# Patient Record
Sex: MALE | Race: BLACK OR AFRICAN AMERICAN
[De-identification: names, ages, dates, MRNs, and addresses within clinical notes are randomized per-mention and may not be internally consistent; named-entity substitution may affect disease eponyms.]

---

## 2017-02-07 ENCOUNTER — HOSPITAL ENCOUNTER (EMERGENCY)
Dept: HOSPITAL 17 - NEPE | Age: 67
Discharge: HOME | End: 2017-02-07
Payer: COMMERCIAL

## 2017-02-07 VITALS
DIASTOLIC BLOOD PRESSURE: 78 MMHG | TEMPERATURE: 97.5 F | OXYGEN SATURATION: 97 % | RESPIRATION RATE: 19 BRPM | SYSTOLIC BLOOD PRESSURE: 148 MMHG | HEART RATE: 89 BPM

## 2017-02-07 VITALS — BODY MASS INDEX: 24.49 KG/M2 | WEIGHT: 165.35 LBS | HEIGHT: 69 IN

## 2017-02-07 VITALS
RESPIRATION RATE: 18 BRPM | DIASTOLIC BLOOD PRESSURE: 64 MMHG | HEART RATE: 85 BPM | OXYGEN SATURATION: 97 % | SYSTOLIC BLOOD PRESSURE: 101 MMHG

## 2017-02-07 VITALS — RESPIRATION RATE: 18 BRPM

## 2017-02-07 VITALS
SYSTOLIC BLOOD PRESSURE: 99 MMHG | DIASTOLIC BLOOD PRESSURE: 67 MMHG | RESPIRATION RATE: 20 BRPM | OXYGEN SATURATION: 97 % | HEART RATE: 83 BPM

## 2017-02-07 VITALS
RESPIRATION RATE: 18 BRPM | OXYGEN SATURATION: 98 % | SYSTOLIC BLOOD PRESSURE: 119 MMHG | HEART RATE: 71 BPM | DIASTOLIC BLOOD PRESSURE: 74 MMHG

## 2017-02-07 DIAGNOSIS — K21.9: ICD-10-CM

## 2017-02-07 DIAGNOSIS — J45.909: ICD-10-CM

## 2017-02-07 DIAGNOSIS — Z95.0: ICD-10-CM

## 2017-02-07 DIAGNOSIS — J44.9: ICD-10-CM

## 2017-02-07 DIAGNOSIS — Z79.4: ICD-10-CM

## 2017-02-07 DIAGNOSIS — K86.1: Primary | ICD-10-CM

## 2017-02-07 DIAGNOSIS — R94.31: ICD-10-CM

## 2017-02-07 DIAGNOSIS — I10: ICD-10-CM

## 2017-02-07 DIAGNOSIS — E11.9: ICD-10-CM

## 2017-02-07 DIAGNOSIS — Z87.442: ICD-10-CM

## 2017-02-07 LAB
ALP SERPL-CCNC: 92 U/L (ref 45–117)
ALT SERPL-CCNC: 12 U/L (ref 12–78)
ANION GAP SERPL CALC-SCNC: 8 MEQ/L (ref 5–15)
APTT BLD: 25.3 SEC (ref 24.3–30.1)
AST SERPL-CCNC: 8 U/L (ref 15–37)
B-OH-BUTYR SERPL-SCNC: 0.39 MMOL/L (ref 0–0.39)
BASOPHILS # BLD AUTO: 0 TH/MM3 (ref 0–0.2)
BASOPHILS NFR BLD: 0.5 % (ref 0–2)
BILIRUB SERPL-MCNC: 0.8 MG/DL (ref 0.2–1)
BUN SERPL-MCNC: 15 MG/DL (ref 7–18)
CHLORIDE SERPL-SCNC: 100 MEQ/L (ref 98–107)
COLOR UR: YELLOW
COMMENT (UR): (no result)
CULTURE IF INDICATED: (no result)
EOSINOPHIL # BLD: 0.4 TH/MM3 (ref 0–0.4)
EOSINOPHIL NFR BLD: 4.3 % (ref 0–4)
ERYTHROCYTE [DISTWIDTH] IN BLOOD BY AUTOMATED COUNT: 12.5 % (ref 11.6–17.2)
GFR SERPLBLD BASED ON 1.73 SQ M-ARVRAT: 65 ML/MIN (ref 89–?)
GLUCOSE UR STRIP-MCNC: 1000 MG/DL
HCO3 BLD-SCNC: 26.2 MEQ/L (ref 21–32)
HCT VFR BLD CALC: 49.7 % (ref 39–51)
HEMO FLAGS: (no result)
HGB UR QL STRIP: (no result)
INR PPP: 1 RATIO
KETONES UR STRIP-MCNC: (no result) MG/DL
LYMPHOCYTES # BLD AUTO: 2.9 TH/MM3 (ref 1–4.8)
LYMPHOCYTES NFR BLD AUTO: 34.4 % (ref 9–44)
MCH RBC QN AUTO: 31.9 PG (ref 27–34)
MCHC RBC AUTO-ENTMCNC: 34.4 % (ref 32–36)
MCV RBC AUTO: 92.7 FL (ref 80–100)
MONOCYTES NFR BLD: 11.6 % (ref 0–8)
NEUTROPHILS # BLD AUTO: 4.1 TH/MM3 (ref 1.8–7.7)
NEUTROPHILS NFR BLD AUTO: 49.2 % (ref 16–70)
NITRITE UR QL STRIP: (no result)
PLAT MORPH BLD: NORMAL
PLATELET # BLD: 177 TH/MM3 (ref 150–450)
PLATELET BLD QL SMEAR: NORMAL
POTASSIUM SERPL-SCNC: 4.2 MEQ/L (ref 3.5–5.1)
PROTHROMBIN TIME: 10.6 SEC (ref 9.8–11.6)
RBC # BLD AUTO: 5.36 MIL/MM3 (ref 4.5–5.9)
SCAN/DIFF: (no result)
SODIUM SERPL-SCNC: 134 MEQ/L (ref 136–145)
SP GR UR STRIP: 1.03 (ref 1–1.03)
SQUAMOUS #/AREA URNS HPF: <1 /HPF (ref 0–5)
WBC # BLD AUTO: 8.4 TH/MM3 (ref 4–11)

## 2017-02-07 PROCEDURE — 85025 COMPLETE CBC W/AUTO DIFF WBC: CPT

## 2017-02-07 PROCEDURE — 96375 TX/PRO/DX INJ NEW DRUG ADDON: CPT

## 2017-02-07 PROCEDURE — 80053 COMPREHEN METABOLIC PANEL: CPT

## 2017-02-07 PROCEDURE — 81001 URINALYSIS AUTO W/SCOPE: CPT

## 2017-02-07 PROCEDURE — 96374 THER/PROPH/DIAG INJ IV PUSH: CPT

## 2017-02-07 PROCEDURE — 96372 THER/PROPH/DIAG INJ SC/IM: CPT

## 2017-02-07 PROCEDURE — 82010 KETONE BODYS QUAN: CPT

## 2017-02-07 PROCEDURE — 99284 EMERGENCY DEPT VISIT MOD MDM: CPT

## 2017-02-07 PROCEDURE — 85730 THROMBOPLASTIN TIME PARTIAL: CPT

## 2017-02-07 PROCEDURE — 83735 ASSAY OF MAGNESIUM: CPT

## 2017-02-07 PROCEDURE — 71010: CPT

## 2017-02-07 PROCEDURE — 84484 ASSAY OF TROPONIN QUANT: CPT

## 2017-02-07 PROCEDURE — 74177 CT ABD & PELVIS W/CONTRAST: CPT

## 2017-02-07 PROCEDURE — 93005 ELECTROCARDIOGRAM TRACING: CPT

## 2017-02-07 PROCEDURE — 85610 PROTHROMBIN TIME: CPT

## 2017-02-07 PROCEDURE — 83690 ASSAY OF LIPASE: CPT

## 2017-02-07 PROCEDURE — 96361 HYDRATE IV INFUSION ADD-ON: CPT

## 2017-02-07 NOTE — RADRPT
EXAM DATE/TIME:  02/07/2017 08:11 

 

[facility] COMPARISON:     

CHEST SINGLE AP, May 23, 2016, 11:47.

 

                     

INDICATIONS:     

Short of breath, pain in upper abdomen

                     

 

MEDICAL HISTORY:     

Chronic obstructive pulmonary disease.  Diabetes mellitus type II.  Cardiovascular disease.   Pancrea
titis   

 

SURGICAL HISTORY:     

Pacemaker. Cholecystectomy.  

 

ENCOUNTER:     

Initial                                        

 

ACUITY:     

1 day      

 

PAIN SCORE:     

0/10

 

LOCATION:     

Bilateral chest 

 

FINDINGS:     

A left subclavian dual lead pacemaker has its tip is in the right atrium and right ventricle.  There 
is no pneumothorax.  Bibasilar fibrotic scarring is stable.  The pulmonary vascular pattern is normal
.  The heart and mediastinal structures are normal.  The lungs are clear without acute focal pulmonar
y infiltrate.  

 

CONCLUSION:     

1. No acute focal pulmonary infiltrate or pulmonary vascular congestion. 

2. Bibasilar fibrotic scarring.  

 

 

 

 Fredy Graham MD on February 07, 2017 at 9:11                

Board Certified Radiologist.

 This report was verified electronically.

## 2017-02-07 NOTE — PD
HPI


Chief Complaint:  Abdominal Pain


Time Seen by Provider:  07:56


Travel History


International Travel<30 days:  No


Contact w/Intl Traveler<30days:  No


Traveled to known affect area:  No





History of Present Illness


HPI


65yo M with PMH of IDDM, chronic pancreatitis, former ETOH abuse, emphysema 

presents to the ED with c/o abdominal pain for 3 days.  Pain is constant, 

periumbilical and epigastric.  +NBNB vomiting.  Last BM yesterday.  Denies any 

fever, chest pain, sob, black stool, urinary complaints.  Pt is on morphine for 

chronic pain but did not take it for days.  Pt also did not take her insulin 

for 2 days because he does not feel like injecting himself.  Pt was 

hospitalized in 5/2016 for DKA and SHIVAM.





PFSH


Past Medical History


Arthritis:  No


Asthma:  Yes


Autoimmune Disease:  No


Blood Disorders:  No


Anxiety:  Yes


Depression:  Yes


Heart Rhythm Problems:  Yes (pace maker placed to treat bradycardia)


Cancer:  No


Cardiac Catheterization:  Yes


Cardiovascular Problems:  Yes


High Cholesterol:  No


Chemotherapy:  No


Chest Pain:  Yes (briseyda states that he some times has chest pain)


Congestive Heart Failure:  No


COPD:  Yes


Cerebrovascular Accident:  No


Diabetes:  Yes


Patient Takes Glucophage:  No


Diminished Hearing:  No


Endocrine:  Yes


Gastrointestinal Disorders:  Yes (PANCREATITIS)


GERD:  Yes


Glaucoma:  No


Genitourinary:  Yes


Headaches:  No


Hepatitis:  No


Hiatal Hernia:  No


Hypertension:  Yes


Immune Disorder:  No


Implanted Vascular Access Dvce:  Yes


Kidney Stones:  Yes


Musculoskeletal:  No


Neurologic:  No


Psychiatric:  No


Reproductive:  No


Respiratory:  Yes


Immunizations Current:  Yes


Migraines:  No


Myocardial Infarction:  No


Pancreatitis:  Yes


Pneumonia:  Yes


Radiation Therapy:  No


Renal Failure:  Yes


Seizures:  No


Sickle Cell Disease:  No


Sleep Apnea:  No


Thyroid Disease:  No


Ulcer:  No


PNEUMOCCOCAL Vaccine (Year):  2





Past Surgical History


Abdominal Surgery:  Yes (Gallbladder removed 1997. repair for same surgery)


AICD:  No


Arteriovenous Shunt:  No


Body Medical Devices:  PACEMAKER


Cardiac Surgery:  Yes (Pace maker 1997)


Cholecystectomy:  Yes


Ear Surgery:  No


Endocrine Surgery:  No


Eye Surgery:  No


Genitourinary Surgery:  No


Gynecologic Surgery:  No


Insulin Pump:  No


Joint Replacement:  No


Neurologic Surgery:  Yes (LAMINECTOMY L4/5)


Oral Surgery:  No


Pacemaker:  Yes (Biotronik)


Thoracic Surgery:  No


Other Surgery:  Yes (HAND SURGERY -RIGHT TENDON REPAIR)





Social History


Alcohol Use:  Yes (STATES QUIT)


Tobacco Use:  No (QUIT 2012)


Substance Use:  Yes (Cocaine, pt states that last time used is unknown)





Allergies-Medications


(Allergen,Severity, Reaction):  


Coded Allergies:  


     MRI PRECAUTION (Verified  Adverse Reaction, Severe, PACEMAKER, 2/7/17)


 PT HAS PACEMAKER PER [facility]SE [facility]


     *MDRO Multi-Drug Resistant Organism (Verified  Adverse Reaction, Unknown, 2 /7/17)


 MRSA PCR (nares) negative - 5/21/16 & 5/24/16


 ***Cleared per Infection Control***


 


 MRSA (blood and urine) - 2/2013


Reported Meds & Prescriptions





Reported Meds & Active Scripts


Active


Reported


Roxicodone (Oxycodone HCl) 15 Mg Tab 15 Mg PO Q8H PRN


Zoloft (Sertraline HCl) 50 Mg Tab 50 Mg PO DAILY


Bydureon Inj (Exenatide) 2 Mg Vial 2 Mg SQ Q7D


Flonase Allergy Relief Nasal Spray (Fluticasone Nasal Spray) 50 Mcg/Act Spray 

50 Mcg EACH NARE BID


Duoneb (Ipratropium-Albuterol Neb) 0.5-2.5 Mg/3 Ml Neb 1 Nebule INH Q4HR NEB


Pantoprazole (Pantoprazole Sodium) 20 Mg Tab 20 Mg PO DAILY


Proair Hfa 8.5 GM Inh (Albuterol Sulfate) 90 Mcg/Act Aer 2 Puff INH Q4-6H PRN


     108 mcg/actuation


Janumet (Sitagliptin-Metformin)  Mg Tab 1 Tab PO BID


Cialis (Tadalafil) 5 Mg Tab 5 Mg PO DAILY


     Do not exceed 1 dose/day.








Review of Systems


Except as stated in HPI:  all other systems reviewed are Neg





Physical Exam


Narrative


GENERAL: 65yo M not in distress.


SKIN: Warm and dry.


HEAD: Atraumatic. Normocephalic. 


NECK: Trachea midline. No JVD. 


CARDIOVASCULAR: Regular rate and rhythm.  No murmur appreciated.


RESPIRATORY: No accessory muscle use. Clear to auscultation. Breath sounds 

equal bilaterally. 


GASTROINTESTINAL: Abdomen soft, Mild periumbilical ttp.  No rebound tenderness 

or guarding.  Midline surgical scar noted.


MUSCULOSKELETAL: No obvious deformities. No clubbing.  No cyanosis.  No edema. 


NEUROLOGICAL: Awake and alert. No obvious cranial nerve deficits.  Motor 

grossly within normal limits. Normal speech.


PSYCHIATRIC: Appropriate mood and affect; insight and judgment normal.





Data


Data


Last Documented VS





Vital Signs








  Date Time  Temp Pulse Resp B/P Pulse Ox O2 Delivery O2 Flow Rate FiO2


 


2/7/17 14:06  71 18 119/74 98 Room Air  


 


2/7/17 07:48 97.5       








Orders





 Complete Blood Count With Diff (2/7/17 08:03)


Comprehensive Metabolic Panel (2/7/17 08:03)


Lipase (2/7/17 08:03)


Prothrombin Time / Inr (Pt) (2/7/17 08:03)


Act Partial Throm Time (Ptt) (2/7/17 08:03)


Urinalysis - C+S If Indicated (2/7/17 08:03)


Iv Access Insert/Monitor (2/7/17 08:03)


Ecg Monitoring (2/7/17 08:03)


Oximetry (2/7/17 08:03)


Ondansetron Inj (Zofran Inj) (2/7/17 08:15)


Sodium Chlor 0.9% 1000 Ml Inj (Ns 1000 M (2/7/17 08:03)


Sodium Chloride 0.9% Flush (Ns Flush) (2/7/17 08:15)


Electrocardiogram (2/7/17 08:03)


Beta Hydroxybutyrate (Acetone) (2/7/17 08:03)


Magnesium (Mg) (2/7/17 08:05)


Chest, Single Ap (2/7/17 )


Troponin I (2/7/17 08:10)


Ct Abd/Pel W Iv Contrast(Rout) (2/7/17 )


Consult Vascular Access Team (2/7/17 )


Vascular Poc Ultrasound (2/7/17 )


Sodium Chlor 0.9% 1000 Ml Inj (Ns 1000 M (2/7/17 13:00)


Insulin Human Regular Inj (Novolin R Inj (2/7/17 13:00)


Iohexol 350 Inj (Omnipaque 350 Inj) (2/7/17 12:55)


Ketorolac Inj (Toradol Inj) (2/7/17 13:00)


Morphine Inj (Morphine Inj) (2/7/17 13:45)


Blood Glucose (2/7/17 13:39)





Labs





 Laboratory Tests








Test 2/7/17 2/7/17 2/7/17





 08:10 09:41 11:23


 


White Blood Count 8.4 TH/MM3  


 


Red Blood Count 5.36 MIL/MM3  


 


Hemoglobin 17.1 GM/DL  


 


Hematocrit 49.7 %  


 


Mean Corpuscular Volume 92.7 FL  


 


Mean Corpuscular Hemoglobin 31.9 PG  


 


Mean Corpuscular Hemoglobin 34.4 %  





Concent   


 


Red Cell Distribution Width 12.5 %  


 


Platelet Count 177 TH/MM3  


 


Mean Platelet Volume 10.2 FL  


 


Neutrophils (%) (Auto) 49.2 %  


 


Lymphocytes (%) (Auto) 34.4 %  


 


Monocytes (%) (Auto) 11.6 %  


 


Eosinophils (%) (Auto) 4.3 %  


 


Basophils (%) (Auto) 0.5 %  


 


Neutrophils # (Auto) 4.1 TH/MM3  


 


Lymphocytes # (Auto) 2.9 TH/MM3  


 


Monocytes # (Auto) 1.0 TH/MM3  


 


Eosinophils # (Auto) 0.4 TH/MM3  


 


Basophils # (Auto) 0.0 TH/MM3  


 


CBC Comment AUTO DIFF   


 


Differential Comment AUTO DIFF  





 CONFIRMED  


 


Platelet Estimate NORMAL   


 


Platelet Morphology Comment NORMAL   


 


Prothrombin Time  10.6 SEC 


 


Prothromb Time International  1.0 RATIO 





Ratio   


 


Activated Partial  25.3 SEC 





Thromboplast Time   


 


Urine Color  YELLOW  


 


Urine Turbidity  CLEAR  


 


Urine pH  5.5  


 


Urine Specific Gravity  1.030  


 


Urine Protein  NEG mg/dL 


 


Urine Glucose (UA)  1000 mg/dL 


 


Urine Ketones  NEG mg/dL 


 


Urine Occult Blood  NEG  


 


Urine Nitrite  NEG  


 


Urine Bilirubin  NEG  


 


Urine Urobilinogen  LESS THAN 2.0 





  MG/DL 


 


Urine Leukocyte Esterase  NEG  


 


Urine RBC  1 /hpf 


 


Urine WBC  3 /hpf 


 


Urine Squamous Epithelial  <1 /hpf 





Cells   


 


Microscopic Urinalysis Comment  CULT NOT 





  INDICATED 


 


Sodium Level   134 MEQ/L


 


Potassium Level   4.2 MEQ/L


 


Chloride Level   100 MEQ/L


 


Carbon Dioxide Level   26.2 MEQ/L


 


Anion Gap   8 MEQ/L


 


Blood Urea Nitrogen   15 MG/DL


 


Creatinine   1.33 MG/DL


 


Estimat Glomerular Filtration   65 ML/MIN





Rate   


 


Random Glucose   372 MG/DL


 


Calcium Level   8.3 MG/DL


 


Magnesium Level   1.8 MG/DL


 


Total Bilirubin   0.8 MG/DL


 


Aspartate Amino Transf   8 U/L





(AST/SGOT)   


 


Alanine Aminotransferase   12 U/L





(ALT/SGPT)   


 


Alkaline Phosphatase   92 U/L


 


Troponin I   LESS THAN 0.02





   NG/ML


 


Total Protein   6.7 GM/DL


 


Albumin   3.3 GM/DL


 


Lipase   316 U/L


 


B-Hydroxybutyrate   0.39 MMOL/L











[facility]


Medical Decision Making


Medical Screen Exam Complete:  Yes


Emergency Medical Condition:  Yes


Interpretation(s)


EKG: NSR 84bpm.  LAD.  No ST segment elevation or depression.


Differential Diagnosis


Acute on chronic pancreatitis vs. colitis vs. gastritis vs.  DKA vs. 

electrolyte abnormalities vs. UTI


Narrative Course


65yo M with abdominal pain and chronic pancreatitis.  Pt is not in distress and 

has not been compliant with medications.  States he has his insulin at home.  

Labs reviewed, no leukocytosis.  Creatinine mildly elevated at 1.33.  Glucose 

is elevated at 372.  No increased anion gap.  K: 4.2.  Troponin negative.  

Lipase 316.  b-Hydroxybutrate normal.  UA negative.  Pt given NS IVF x2 and 

zofran and toradol.  Pt is no longer nauseous, tolerating PO.  Given 5 units of 

regular insulin.  CXR negative.  CTa/p showed dilatation of pancreatic duct 

measuring up to 7-8mm increase from 6mm from prior.  Discussed with GI Dr. Love and states this is progression of his chronic pancreatitis and he can 

follow up as outpatient.  Pt still with some pain, morphine 2mg IV given with 

improvement of pain and return precautions given.  Repeat blood glucose is 250.





Diagnosis





 Primary Impression:  


 Chronic pancreatitis


 Qualified Code:  K86.0 - Alcohol-induced chronic pancreatitis


Patient Instructions:  General Instructions


Departure Forms:  Tests/Procedures





***Additional Instructions:


Please follow up with GI as outpatient in 3-7 days.  Return to the ED if 

symptoms worsen.  Please take your insulin at home.


***Med/Other Pt SpecificInfo:  No Change to Meds


Disposition:  01 DISCHARGE HOME


Condition:  Stable








Sobia Robldeo DO Feb 7, 2017 08:10

## 2017-02-07 NOTE — RADRPT
EXAM DATE/TIME:  02/07/2017 12:47 

 

[facility] COMPARISON:     

CT ABDOMEN & PELVIS W CONTRAST, May 27, 2016, 19:29.

 

 

INDICATIONS :     

Epigastric pain, prior history of pancreatitis

                      

 

IV CONTRAST:     

90 cc Omnipaque 350 (iohexol) IV 

 

 

ORAL CONTRAST:      

No oral contrast ingested.

                      

 

RADIATION DOSE:     

6.19 CTDIvol (mGy) 

 

 

MEDICAL HISTORY :     

Hypertension. Diabetes mellitus type 1. Chronic obstructive pulmonary disease.

 

SURGICAL HISTORY :      

Cholecystectomy. 

 

ENCOUNTER:      

Initial

 

ACUITY:      

1 day

 

PAIN SCALE:      

5/10

 

LOCATION:         

epigastric 

 

TECHNIQUE:     

Volumetric scanning of the abdomen and pelvis was performed.  Using automated exposure control and ad
justment of the mA and/or kV according to patient size, radiation dose was kept as low as reasonably 
achievable to obtain optimal diagnostic quality images. 

 

FINDINGS:     

 

LOWER LUNGS:     

Chronic scarring is present at the lung bases.

 

LIVER:     

Homogeneous density without lesion. The patient is again noted to be status post cholecystectomy. Pne
umobilia remains in the left lobe. There is diffuse fatty infiltration with no focal mass.

 

SPLEEN:     

Normal size without lesion.

 

PANCREAS:     

The pancreatic duct is dilated measuring up to 7-8 mm in greatest diameter. On the prior study this m
easured approximately 6 mm in diameter. There is no distinct focal pancreatic mass or inflammatory ch
andrae. The pancreas is stable in size. There is a small calcification in the pancreatic head.

 

KIDNEYS:     

Normal in size and shape.  There is no mass, stone or hydronephrosis.

 

ADRENAL GLANDS:     

Within normal limits.

 

VASCULAR:     

There is no aortic aneurysm.

 

BOWEL/MESENTERY:     

The stomach, small bowel, and colon demonstrate no acute abnormality.  There is no free intraperitone
al air or fluid. There is a normal appendix.

 

ABDOMINAL WALL:     

Within normal limits.

 

RETROPERITONEUM:     

There is no lymphadenopathy. 

 

BLADDER:     

No wall thickening or mass. 

 

REPRODUCTIVE:     

Within normal limits.

 

INGUINAL:     

There is no lymphadenopathy or hernia. 

 

MUSCULOSKELETAL:     

Within normal limits for patient age. 

 

CONCLUSION:     

1. Dilatation of the pancreatic duct measuring up to 7-8 mm which is increased in size compared to th
e prior study. There is no distinct pancreatic mass. There is a small calcification which may be rela
avis to chronic pancreatitis. There is no acute acute inflammatory change.

2. Status post cholecystectomy with no evidence of biliary obstruction. Pneumobilia remains in the le
ft lobe.

3. Fatty infiltration of the liver.

4. Unremarkable bowel gas pattern.

 

 

 

 Nathan Chavez MD on February 07, 2017 at 12:58           

Board Certified Radiologist.

 This report was verified electronically.

## 2017-06-29 ENCOUNTER — HOSPITAL ENCOUNTER (EMERGENCY)
Dept: HOSPITAL 17 - NEPE | Age: 67
Discharge: HOME | End: 2017-06-29
Payer: COMMERCIAL

## 2017-06-29 VITALS
DIASTOLIC BLOOD PRESSURE: 93 MMHG | RESPIRATION RATE: 14 BRPM | TEMPERATURE: 98.4 F | SYSTOLIC BLOOD PRESSURE: 155 MMHG | HEART RATE: 93 BPM

## 2017-06-29 VITALS
RESPIRATION RATE: 18 BRPM | OXYGEN SATURATION: 98 % | DIASTOLIC BLOOD PRESSURE: 96 MMHG | SYSTOLIC BLOOD PRESSURE: 156 MMHG | HEART RATE: 90 BPM | TEMPERATURE: 98.4 F

## 2017-06-29 VITALS — HEIGHT: 69 IN | WEIGHT: 154.32 LBS | BODY MASS INDEX: 22.86 KG/M2

## 2017-06-29 DIAGNOSIS — K85.20: Primary | ICD-10-CM

## 2017-06-29 DIAGNOSIS — I10: ICD-10-CM

## 2017-06-29 DIAGNOSIS — F41.9: ICD-10-CM

## 2017-06-29 DIAGNOSIS — E11.9: ICD-10-CM

## 2017-06-29 DIAGNOSIS — Z87.442: ICD-10-CM

## 2017-06-29 DIAGNOSIS — J44.9: ICD-10-CM

## 2017-06-29 DIAGNOSIS — K21.9: ICD-10-CM

## 2017-06-29 DIAGNOSIS — J45.909: ICD-10-CM

## 2017-06-29 DIAGNOSIS — F32.9: ICD-10-CM

## 2017-06-29 LAB
ALP SERPL-CCNC: 103 U/L (ref 45–117)
ALT SERPL-CCNC: 19 U/L (ref 12–78)
ANION GAP SERPL CALC-SCNC: 7 MEQ/L (ref 5–15)
AST SERPL-CCNC: 17 U/L (ref 15–37)
BASOPHILS # BLD AUTO: 0 TH/MM3 (ref 0–0.2)
BASOPHILS NFR BLD: 0.3 % (ref 0–2)
BILIRUB SERPL-MCNC: 1.2 MG/DL (ref 0.2–1)
BUN SERPL-MCNC: 9 MG/DL (ref 7–18)
CHLORIDE SERPL-SCNC: 102 MEQ/L (ref 98–107)
COLOR UR: YELLOW
COMMENT (UR): (no result)
CULTURE IF INDICATED: (no result)
EOSINOPHIL # BLD: 0.2 TH/MM3 (ref 0–0.4)
EOSINOPHIL NFR BLD: 2.1 % (ref 0–4)
ERYTHROCYTE [DISTWIDTH] IN BLOOD BY AUTOMATED COUNT: 12.4 % (ref 11.6–17.2)
GFR SERPLBLD BASED ON 1.73 SQ M-ARVRAT: 85 ML/MIN (ref 89–?)
GLUCOSE UR STRIP-MCNC: 1000 MG/DL
HCO3 BLD-SCNC: 28.8 MEQ/L (ref 21–32)
HCT VFR BLD CALC: 48.3 % (ref 39–51)
HEMO FLAGS: (no result)
HGB UR QL STRIP: (no result)
KETONES UR STRIP-MCNC: (no result) MG/DL
LYMPHOCYTES # BLD AUTO: 1.6 TH/MM3 (ref 1–4.8)
LYMPHOCYTES NFR BLD AUTO: 21.3 % (ref 9–44)
MCH RBC QN AUTO: 31.8 PG (ref 27–34)
MCHC RBC AUTO-ENTMCNC: 34.1 % (ref 32–36)
MCV RBC AUTO: 93.3 FL (ref 80–100)
MONOCYTES NFR BLD: 11.9 % (ref 0–8)
NEUTROPHILS # BLD AUTO: 4.8 TH/MM3 (ref 1.8–7.7)
NEUTROPHILS NFR BLD AUTO: 64.4 % (ref 16–70)
NITRITE UR QL STRIP: (no result)
PLATELET # BLD: 177 TH/MM3 (ref 150–450)
POTASSIUM SERPL-SCNC: 3.9 MEQ/L (ref 3.5–5.1)
RBC # BLD AUTO: 5.18 MIL/MM3 (ref 4.5–5.9)
SODIUM SERPL-SCNC: 138 MEQ/L (ref 136–145)
SP GR UR STRIP: 1.01 (ref 1–1.03)
SQUAMOUS #/AREA URNS HPF: <1 /HPF (ref 0–5)
TRANS CELLS #/AREA URNS HPF: <1 /HPF
WBC # BLD AUTO: 7.5 TH/MM3 (ref 4–11)

## 2017-06-29 PROCEDURE — 85025 COMPLETE CBC W/AUTO DIFF WBC: CPT

## 2017-06-29 PROCEDURE — 83690 ASSAY OF LIPASE: CPT

## 2017-06-29 PROCEDURE — 96376 TX/PRO/DX INJ SAME DRUG ADON: CPT

## 2017-06-29 PROCEDURE — 99284 EMERGENCY DEPT VISIT MOD MDM: CPT

## 2017-06-29 PROCEDURE — 96361 HYDRATE IV INFUSION ADD-ON: CPT

## 2017-06-29 PROCEDURE — 96374 THER/PROPH/DIAG INJ IV PUSH: CPT

## 2017-06-29 PROCEDURE — 96375 TX/PRO/DX INJ NEW DRUG ADDON: CPT

## 2017-06-29 PROCEDURE — 81001 URINALYSIS AUTO W/SCOPE: CPT

## 2017-06-29 PROCEDURE — 80053 COMPREHEN METABOLIC PANEL: CPT

## 2017-06-29 NOTE — PD
HPI


Chief Complaint:  Abdominal Pain


Time Seen by Provider:  12:45


Travel History


International Travel<30 days:  No


Contact w/Intl Traveler<30days:  No


Traveled to known affect area:  No





History of Present Illness


HPI


This is a 66 year old male with a history of recurrent pancreatitis who 

presents for evaluation of epigastric abdominal pain.  He reports that 5 days 

ago he went on a drinking binge for a birthday party.  He reports that the next 

day developed epigastric abdominal pain.  The pain is a burning sensation which 

is constant, consistent with previous episodes of pancreatitis.  He reports 

occasional intermittent vomiting.  He has been tolerating oral food, for 

example this morning he ate molina, eggs and grits.  He denies any fevers, 

diarrhea, upper respiratory symptoms, chest pain or shortness of breath, rash, 

flank pain, testicular or scrotal pain.  He has no other complaints.





PFSH


Past Medical History


Arthritis:  No


Asthma:  Yes


Autoimmune Disease:  No


Blood Disorders:  No


Anxiety:  Yes


Depression:  Yes


Heart Rhythm Problems:  Yes (pace maker placed to treat bradycardia)


Cancer:  No


Cardiac Catheterization:  Yes


Cardiovascular Problems:  Yes


High Cholesterol:  No


Chemotherapy:  No


Chest Pain:  Yes (briseyda states that he some times has chest pain)


Congestive Heart Failure:  No


COPD:  Yes


Cerebrovascular Accident:  No


Diabetes:  Yes


Diminished Hearing:  No


Endocrine:  Yes


Gastrointestinal Disorders:  Yes (PANCREATITIS)


GERD:  Yes


Glaucoma:  No


Genitourinary:  Yes


Headaches:  No


Hepatitis:  No


Hiatal Hernia:  No


Hypertension:  Yes


Immune Disorder:  No


Implanted Vascular Access Dvce:  Yes


Kidney Stones:  Yes


Musculoskeletal:  No


Neurologic:  No


Psychiatric:  No


Reproductive:  No


Respiratory:  Yes


Immunizations Current:  Yes


Migraines:  No


Myocardial Infarction:  No


Pancreatitis:  Yes


Pneumonia:  Yes


Radiation Therapy:  No


Renal Failure:  Yes


Seizures:  No


Sickle Cell Disease:  No


Sleep Apnea:  No


Thyroid Disease:  No


Ulcer:  No


PNEUMOCCOCAL Vaccine (Year):  2





Past Surgical History


Abdominal Surgery:  Yes (Gallbladder removed 1997. repair for same surgery)


AICD:  No


Arteriovenous Shunt:  No


Body Medical Devices:  PACEMAKER


Cardiac Surgery:  Yes (Pace maker 1997)


Cholecystectomy:  Yes


Ear Surgery:  No


Endocrine Surgery:  No


Eye Surgery:  No


Genitourinary Surgery:  No


Gynecologic Surgery:  No


Insulin Pump:  No


Joint Replacement:  No


Neurologic Surgery:  Yes (LAMINECTOMY L4/5)


Oral Surgery:  No


Pacemaker:  Yes (Biotronik)


Thoracic Surgery:  No


Other Surgery:  Yes (HAND SURGERY -RIGHT TENDON REPAIR)





Social History


Alcohol Use:  Yes (STATES QUIT)


Tobacco Use:  No (QUIT 2012)


Substance Use:  Yes (Cocaine, pt states that last time used is unknown)





Allergies-Medications


(Allergen,Severity, Reaction):  


Coded Allergies:  


     MRI PRECAUTION (Verified  Adverse Reaction, Severe, PACEMAKER, 6/29/17)


 PT HAS PACEMAKER PER NURSE [facility]


     *MDRO Multi-Drug Resistant Organism (Verified  Adverse Reaction, Unknown, 6 /29/17)


 MRSA PCR (nares) negative - 5/21/16 & 5/24/16


 ***Cleared per Infection Control***


 


 MRSA (blood and urine) - 2/2013


Reported Meds & Prescriptions





Reported Meds & Active Scripts


Active


Zofran Odt (Ondansetron Odt) 4 Mg Tab 4 Mg SL Q6HR PRN


Reported


Roxicodone (Oxycodone HCl) 15 Mg Tab 15 Mg PO Q8H PRN


Flonase Nasal Spray (Fluticasone Nasal Spray) 50 Mcg/Act Spray 50 Mcg EACH NARE 

BID


Duoneb (Ipratropium-Albuterol Neb) 0.5-2.5 Mg/3 Ml Neb 1 Nebule INH Q4HR NEB


Pantoprazole (Pantoprazole Sodium) 20 Mg Tab 20 Mg PO DAILY


Proair Hfa 8.5 GM Inh (Albuterol Sulfate) 90 Mcg/Act Aer 2 Puff INH Q4-6H PRN


     108 mcg/actuation


Janumet (Sitagliptin-Metformin)  Mg Tab 1 Tab PO BID


Cialis (Tadalafil) 5 Mg Tab 5 Mg PO DAILY


     Do not exceed 1 dose/day.








Review of Systems


Except as stated in HPI:  all other systems reviewed are Neg





Physical Exam


Narrative


GENERAL: Well-developed well-nourished male in no acute distress, ambulatory in 

the ED.


SKIN: Warm and dry.


HEAD: Atraumatic. Normocephalic. 


EYES: Pupils equal and round. No scleral icterus. No injection or drainage. 


ENT: No nasal bleeding or discharge.  Mucous membranes pink and moist.


NECK: Trachea midline. No JVD. 


CARDIOVASCULAR: Regular rate and rhythm.  No murmur appreciated.


RESPIRATORY: No accessory muscle use. Clear to auscultation. Breath sounds 

equal bilaterally. 


GASTROINTESTINAL: Abdomen soft, mild epigastric tenderness without guarding.  

No CVA tenderness.


MUSCULOSKELETAL: No obvious deformities. No edema. 


NEUROLOGICAL: Awake and alert. No obvious cranial nerve deficits.  Motor 

grossly within normal limits. Normal speech.


PSYCHIATRIC: Appropriate mood and affect; insight and judgment normal.





Data


Data


Last Documented VS





Vital Signs








  Date Time  Temp Pulse Resp B/P Pulse Ox O2 Delivery O2 Flow Rate FiO2


 


6/29/17 12:48 98.4 93 14 155/93  Room Air  


 


6/29/17 12:29     98   








Orders








 Complete Blood Count With Diff (6/29/17 12:51)


Comprehensive Metabolic Panel (6/29/17 12:51)


Lipase (6/29/17 12:51)


Urinalysis - C+S If Indicated (6/29/17 12:51)


Iv Access Insert/Monitor (6/29/17 12:51)


Morphine Inj (Morphine Inj) (6/29/17 13:00)


Ondansetron Inj (Zofran Inj) (6/29/17 13:00)


Sodium Chlor 0.9% 1000 Ml Inj (Ns 1000 M (6/29/17 12:51)


Sodium Chlor 0.9% 1000 Ml Inj (Ns 1000 M (6/29/17 14:28)


Morphine Inj (Morphine Inj) (6/29/17 14:30)








Labs








 Laboratory Tests








Test 6/29/17 6/29/17 6/29/17





 12:55 13:46 14:35


 


White Blood Count 7.5 TH/MM3  


 


Red Blood Count 5.18 MIL/MM3  


 


Hemoglobin 16.5 GM/DL  


 


Hematocrit 48.3 %  


 


Mean Corpuscular Volume 93.3 FL  


 


Mean Corpuscular Hemoglobin 31.8 PG  


 


Mean Corpuscular Hemoglobin 34.1 %  





Concent   


 


Red Cell Distribution Width 12.4 %  


 


Platelet Count 177 TH/MM3  


 


Mean Platelet Volume 9.2 FL  


 


Neutrophils (%) (Auto) 64.4 %  


 


Lymphocytes (%) (Auto) 21.3 %  


 


Monocytes (%) (Auto) 11.9 %  


 


Eosinophils (%) (Auto) 2.1 %  


 


Basophils (%) (Auto) 0.3 %  


 


Neutrophils # (Auto) 4.8 TH/MM3  


 


Lymphocytes # (Auto) 1.6 TH/MM3  


 


Monocytes # (Auto) 0.9 TH/MM3  


 


Eosinophils # (Auto) 0.2 TH/MM3  


 


Basophils # (Auto) 0.0 TH/MM3  


 


CBC Comment DIFF FINAL   


 


Differential Comment    


 


Urine Color  YELLOW  


 


Urine Turbidity  CLEAR  


 


Urine pH  7.5  


 


Urine Specific Gravity  1.015  


 


Urine Protein  TRACE mg/dL 


 


Urine Glucose (UA)  1000 mg/dL 


 


Urine Ketones  NEG mg/dL 


 


Urine Occult Blood  NEG  


 


Urine Nitrite  NEG  


 


Urine Bilirubin  NEG  


 


Urine Urobilinogen  2.0 MG/DL 


 


Urine Leukocyte Esterase  NEG  


 


Urine RBC  LESS THAN 1 





  /hpf 


 


Urine WBC  1 /hpf 


 


Urine Squamous Epithelial  <1 /hpf 





Cells   


 


Urine Transitional Epithelial  <1 /hpf 





Cells   


 


Microscopic Urinalysis Comment  CULT NOT 





  INDICATED 


 


Sodium Level   138 MEQ/L


 


Potassium Level   3.9 MEQ/L


 


Chloride Level   102 MEQ/L


 


Carbon Dioxide Level   28.8 MEQ/L


 


Anion Gap   7 MEQ/L


 


Blood Urea Nitrogen   9 MG/DL


 


Creatinine   1.06 MG/DL


 


Estimat Glomerular Filtration   85 ML/MIN





Rate   


 


Random Glucose   213 MG/DL


 


Calcium Level   8.3 MG/DL


 


Total Bilirubin   1.2 MG/DL


 


Aspartate Amino Transf   17 U/L





(AST/SGOT)   


 


Alanine Aminotransferase   19 U/L





(ALT/SGPT)   


 


Alkaline Phosphatase   103 U/L


 


Total Protein   7.9 GM/DL


 


Albumin   3.3 GM/DL


 


Lipase   521 U/L














MDM


Medical Decision Making


Medical Screen Exam Complete:  Yes


Emergency Medical Condition:  Yes


Medical Record Reviewed:  Yes


Differential Diagnosis


Acute exacerbation of chronic pancreatitis, pancreatic hemorrhage, abscess, 

gastritis, perforated viscus, AAA


Narrative Course


66-year-old male with history of frequent exacerbations of alcohol-induced 

pancreatitis presents for evaluation of 4 days of epigastric abdominal pain 

which started after a drinking binge.  His pain is consistent with previous 

episodes of pancreatitis.  He has been seen here multiple times in the past 

with similar pain.  On examination he has mild epigastric tenderness to 

palpation.  Plan is for basic lab work, IV fluids, morphine, Zofran.





Lab work is been reviewed.  The lipase is mildly elevated in the mid 500s.  He 

feels improvement after the second dose of IV fluids and morphine.  He is 

stable for discharge.





Diagnosis





 Primary Impression:  


 Pancreatitis


 Qualified Code:  K85.20 - Alcohol-induced acute pancreatitis, unspecified 

complication status





***Additional Instructions:


Liquid diet over the next 3-4 days.  Zofran for nausea.  Avoid alcohol use.  

Follow-up with primary care physician.  Return for any emergent medical 

conditions.


***Med/Other Pt SpecificInfo:  Prescription(s) given


Scripts


Ondansetron Odt (Zofran Odt)4 Mg Tab4 Mg SL Q6HR PRN (Nausea/Vomiting) #20 TAB  

Ref 0


   Prov:Megan Bradshaw MD         6/29/17


Disposition:  01 DISCHARGE HOME


Condition:  Stable








Mateo Warner Jun 29, 2017 12:55

## 2017-07-30 ENCOUNTER — HOSPITAL ENCOUNTER (INPATIENT)
Dept: HOSPITAL 17 - NEPC | Age: 67
LOS: 2 days | Discharge: HOME | DRG: 440 | End: 2017-08-01
Attending: HOSPITALIST | Admitting: HOSPITALIST
Payer: COMMERCIAL

## 2017-07-30 VITALS
HEART RATE: 65 BPM | RESPIRATION RATE: 23 BRPM | SYSTOLIC BLOOD PRESSURE: 157 MMHG | DIASTOLIC BLOOD PRESSURE: 100 MMHG | OXYGEN SATURATION: 100 % | TEMPERATURE: 98.3 F

## 2017-07-30 VITALS — HEIGHT: 69 IN | BODY MASS INDEX: 23.58 KG/M2 | WEIGHT: 159.17 LBS

## 2017-07-30 VITALS
HEART RATE: 60 BPM | DIASTOLIC BLOOD PRESSURE: 95 MMHG | RESPIRATION RATE: 19 BRPM | SYSTOLIC BLOOD PRESSURE: 151 MMHG | OXYGEN SATURATION: 96 %

## 2017-07-30 VITALS
SYSTOLIC BLOOD PRESSURE: 197 MMHG | DIASTOLIC BLOOD PRESSURE: 113 MMHG | TEMPERATURE: 96.9 F | OXYGEN SATURATION: 99 % | RESPIRATION RATE: 20 BRPM | HEART RATE: 62 BPM

## 2017-07-30 VITALS
SYSTOLIC BLOOD PRESSURE: 186 MMHG | TEMPERATURE: 96.4 F | OXYGEN SATURATION: 98 % | HEART RATE: 73 BPM | RESPIRATION RATE: 22 BRPM | DIASTOLIC BLOOD PRESSURE: 99 MMHG

## 2017-07-30 VITALS — OXYGEN SATURATION: 98 %

## 2017-07-30 DIAGNOSIS — F10.20: ICD-10-CM

## 2017-07-30 DIAGNOSIS — K85.20: Primary | ICD-10-CM

## 2017-07-30 DIAGNOSIS — F41.9: ICD-10-CM

## 2017-07-30 DIAGNOSIS — K21.9: ICD-10-CM

## 2017-07-30 DIAGNOSIS — E11.65: ICD-10-CM

## 2017-07-30 DIAGNOSIS — G89.29: ICD-10-CM

## 2017-07-30 DIAGNOSIS — F32.9: ICD-10-CM

## 2017-07-30 DIAGNOSIS — J44.9: ICD-10-CM

## 2017-07-30 DIAGNOSIS — I12.9: ICD-10-CM

## 2017-07-30 DIAGNOSIS — Z79.84: ICD-10-CM

## 2017-07-30 DIAGNOSIS — Z87.442: ICD-10-CM

## 2017-07-30 DIAGNOSIS — Z95.0: ICD-10-CM

## 2017-07-30 DIAGNOSIS — K86.1: ICD-10-CM

## 2017-07-30 DIAGNOSIS — E11.22: ICD-10-CM

## 2017-07-30 DIAGNOSIS — N18.3: ICD-10-CM

## 2017-07-30 DIAGNOSIS — Z87.891: ICD-10-CM

## 2017-07-30 LAB
ALP SERPL-CCNC: 76 U/L (ref 45–117)
ALT SERPL-CCNC: 17 U/L (ref 12–78)
ANION GAP SERPL CALC-SCNC: 12 MEQ/L (ref 5–15)
AST SERPL-CCNC: 31 U/L (ref 15–37)
BASOPHILS # BLD AUTO: 0 TH/MM3 (ref 0–0.2)
BASOPHILS NFR BLD: 0.4 % (ref 0–2)
BILIRUB SERPL-MCNC: 1.2 MG/DL (ref 0.2–1)
BUN SERPL-MCNC: 13 MG/DL (ref 7–18)
CHLORIDE SERPL-SCNC: 100 MEQ/L (ref 98–107)
CK SERPL-CCNC: 225 U/L (ref 39–308)
EOSINOPHIL # BLD: 0 TH/MM3 (ref 0–0.4)
EOSINOPHIL NFR BLD: 0.5 % (ref 0–4)
ERYTHROCYTE [DISTWIDTH] IN BLOOD BY AUTOMATED COUNT: 12.3 % (ref 11.6–17.2)
GFR SERPLBLD BASED ON 1.73 SQ M-ARVRAT: 68 ML/MIN (ref 89–?)
HCO3 BLD-SCNC: 27.3 MEQ/L (ref 21–32)
HCT VFR BLD CALC: 42.8 % (ref 39–51)
HEMO FLAGS: (no result)
LYMPHOCYTES # BLD AUTO: 1.3 TH/MM3 (ref 1–4.8)
LYMPHOCYTES NFR BLD AUTO: 21.7 % (ref 9–44)
MCH RBC QN AUTO: 32.2 PG (ref 27–34)
MCHC RBC AUTO-ENTMCNC: 34.4 % (ref 32–36)
MCV RBC AUTO: 93.4 FL (ref 80–100)
MONOCYTES NFR BLD: 10.7 % (ref 0–8)
NEUTROPHILS # BLD AUTO: 4 TH/MM3 (ref 1.8–7.7)
NEUTROPHILS NFR BLD AUTO: 66.7 % (ref 16–70)
PLATELET # BLD: 144 TH/MM3 (ref 150–450)
POTASSIUM SERPL-SCNC: 3.4 MEQ/L (ref 3.5–5.1)
RBC # BLD AUTO: 4.59 MIL/MM3 (ref 4.5–5.9)
SODIUM SERPL-SCNC: 139 MEQ/L (ref 136–145)
WBC # BLD AUTO: 5.9 TH/MM3 (ref 4–11)

## 2017-07-30 PROCEDURE — 94664 DEMO&/EVAL PT USE INHALER: CPT

## 2017-07-30 PROCEDURE — 94640 AIRWAY INHALATION TREATMENT: CPT

## 2017-07-30 PROCEDURE — 96375 TX/PRO/DX INJ NEW DRUG ADDON: CPT

## 2017-07-30 PROCEDURE — 83690 ASSAY OF LIPASE: CPT

## 2017-07-30 PROCEDURE — 82948 REAGENT STRIP/BLOOD GLUCOSE: CPT

## 2017-07-30 PROCEDURE — 84484 ASSAY OF TROPONIN QUANT: CPT

## 2017-07-30 PROCEDURE — 96374 THER/PROPH/DIAG INJ IV PUSH: CPT

## 2017-07-30 PROCEDURE — 85025 COMPLETE CBC W/AUTO DIFF WBC: CPT

## 2017-07-30 PROCEDURE — 80053 COMPREHEN METABOLIC PANEL: CPT

## 2017-07-30 PROCEDURE — 93005 ELECTROCARDIOGRAM TRACING: CPT

## 2017-07-30 PROCEDURE — 82550 ASSAY OF CK (CPK): CPT

## 2017-07-30 RX ADMIN — PHENYTOIN SODIUM SCH MLS/HR: 50 INJECTION INTRAMUSCULAR; INTRAVENOUS at 15:20

## 2017-07-30 RX ADMIN — HYDROMORPHONE HYDROCHLORIDE PRN MG: 1 INJECTION, SOLUTION INTRAMUSCULAR; INTRAVENOUS; SUBCUTANEOUS at 22:59

## 2017-07-30 RX ADMIN — PHENYTOIN SODIUM SCH MLS/HR: 50 INJECTION INTRAMUSCULAR; INTRAVENOUS at 21:25

## 2017-07-30 RX ADMIN — INSULIN ASPART SCH: 100 INJECTION, SOLUTION INTRAVENOUS; SUBCUTANEOUS at 20:31

## 2017-07-30 RX ADMIN — HYDROMORPHONE HYDROCHLORIDE PRN MG: 1 INJECTION, SOLUTION INTRAMUSCULAR; INTRAVENOUS; SUBCUTANEOUS at 18:14

## 2017-07-30 RX ADMIN — Medication SCH ML: at 20:16

## 2017-07-30 RX ADMIN — MORPHINE SULFATE PRN MG: 8 INJECTION INTRAVENOUS at 20:15

## 2017-07-30 RX ADMIN — STANDARDIZED SENNA CONCENTRATE AND DOCUSATE SODIUM SCH TAB: 8.6; 5 TABLET, FILM COATED ORAL at 20:17

## 2017-07-30 RX ADMIN — INSULIN ASPART SCH: 100 INJECTION, SOLUTION INTRAVENOUS; SUBCUTANEOUS at 17:20

## 2017-07-30 RX ADMIN — IPRATROPIUM BROMIDE AND ALBUTEROL SULFATE SCH AMPULE: .5; 3 SOLUTION RESPIRATORY (INHALATION) at 22:11

## 2017-07-30 NOTE — HHI.HP
__________________________________________________





hospitals


Service


[facility]


Primary Care Physician


DEENILSON Ty


Admission Diagnosis


acute pancreatitis


Diagnoses:  


Travel History


International Travel<30 Days:  No


Contact w/Intl Traveler <30 Da:  No


Traveled to Known Affected Are:  No


History of Present Illness


Mr. Osuna is a 66 year old male.  He has a history of alcohol abuse in the 

past, but has recently only been using alcohol occasionally.  He has a past 

history of pancreatitis, which has motivated him to drink less.  He was at a 

birthday party and had a few drinks of alcohol yesterday.  Starting last night 

he has been having abdominal pain and nausea/vomiting.  He was concerned as it 

felt like pancreatitis, and indeed he has pancreatitis on testing today.  Blood 

sugars are elevated also and potassium is low.  No other complaints.  No 

fevers.  No diarrhea.





Review of Systems


Constitutional:  DENIES: Fever, Chills, Change in appetite


Endocrine:  DENIES: Heat/cold intolerance


Eyes:  DENIES: Blurred vision, Eye pain


Ears, nose, mouth, throat:  DENIES: Hearing loss, Vertigo, Throat pain


Respiratory:  DENIES: Cough, Wheezing, Sputum production


Cardiovascular:  DENIES: Chest pain, Palpitations, Syncope


Gastrointestinal:  COMPLAINS OF: Abdominal pain, Nausea, Vomiting


Musculoskeletal:  DENIES: Joint pain, Muscle aches, Stiffness


Integumentary:  DENIES: Abnormal pigmentation


Hematologic/lymphatic:  DENIES: Bruising


Immunologic/allergic:  DENIES: Eczema


Neurologic:  DENIES: Abnormal gait


Psychiatric:  DENIES: Anxiety, Confusion, Hallucinations





Past Family Social History


Past Medical History


DM2


COPD


CKD3


HTN


Chronic Pancreatitis


Chronic Back Pain


Hx of Bradycardia


Past Surgical History


Lumbar Spine surgery


Cholecystectomy


Right hand laceration repair


Reported Medications





Reported Meds & Active Scripts


Active


Zofran Odt (Ondansetron Odt) 4 Mg Tab 4 Mg SL Q6HR PRN


Reported


Roxicodone (Oxycodone HCl) 15 Mg Tab 15 Mg PO Q8H PRN


Flonase Nasal Spray (Fluticasone Nasal Spray) 50 Mcg/Act Spray 50 Mcg EACH NARE 

BID


Duoneb (Ipratropium-Albuterol Neb) 0.5-2.5 Mg/3 Ml Neb 1 Nebule INH Q4HR NEB


Pantoprazole (Pantoprazole Sodium) 20 Mg Tab 20 Mg PO DAILY


Proair Hfa 8.5 GM Inh (Albuterol Sulfate) 90 Mcg/Act Aer 2 Puff INH Q4-6H PRN


     108 mcg/actuation


Janumet (Sitagliptin-Metformin)  Mg Tab 1 Tab PO BID


Cialis (Tadalafil) 5 Mg Tab 5 Mg PO DAILY


     Do not exceed 1 dose/day.


Allergies:  


Coded Allergies:  


     MRI PRECAUTION (Verified  Adverse Reaction, Severe, PACEMAKER, 6/29/17)


 PT HAS PACEMAKER PER [facility]URSE [facility]


     *MDRO Multi-Drug Resistant Organism (Verified  Adverse Reaction, Unknown, 6 /29/17)


 MRSA PCR (nares) negative - 5/21/16 & 5/24/16


 ***Cleared per Infection Control***


 


 MRSA (blood and urine) - 2/2013


Family History


DM2 in both parents


HTN and COPD in mother


Social History


Past history of smoking, no smoking for 10 years


Past history of alcohol abuse, presently drinks occasionally


No drug abuse





Physical Exam


Vital Signs





 Vital Signs








  Date Time  Temp Pulse Resp B/P Pulse Ox O2 Delivery O2 Flow Rate FiO2


 


7/30/17 12:22     98   


 


7/30/17 11:57 98.3 65 23 157/100 100   








Physical Exam


GENERAL: NAD, A&Ox3


SKIN: Warm and dry.


HEAD: Normocephalic.


EYES: No scleral icterus. No injection or drainage. 


NECK: Supple, trachea midline. No JVD or lymphadenopathy.


CARDIOVASCULAR: Regular rate and rhythm without murmurs, gallops, or rubs. 


RESPIRATORY: Breath sounds equal bilaterally. No accessory muscle use.


GASTROINTESTINAL: Abdomen soft.  Tenderness at upper abdomen.


MUSCULOSKELETAL: No cyanosis, or edema. 


BACK: Nontender without obvious deformity. No CVA tenderness.





Laboratory





Laboratory Tests








Test 7/30/17





 13:15


 


White Blood Count 5.9 


 


Red Blood Count 4.59 


 


Hemoglobin 14.8 


 


Hematocrit 42.8 


 


Mean Corpuscular Volume 93.4 


 


Mean Corpuscular Hemoglobin 32.2 


 


Mean Corpuscular Hemoglobin 34.4 





Concent 


 


Red Cell Distribution Width 12.3 


 


Platelet Count 144 


 


Mean Platelet Volume 8.6 


 


Neutrophils (%) (Auto) 66.7 


 


Lymphocytes (%) (Auto) 21.7 


 


Monocytes (%) (Auto) 10.7 


 


Eosinophils (%) (Auto) 0.5 


 


Basophils (%) (Auto) 0.4 


 


Neutrophils # (Auto) 4.0 


 


Lymphocytes # (Auto) 1.3 


 


Monocytes # (Auto) 0.6 


 


Eosinophils # (Auto) 0.0 


 


Basophils # (Auto) 0.0 


 


CBC Comment DIFF FINAL 


 


Differential Comment  


 


Sodium Level 139 


 


Potassium Level 3.4 


 


Chloride Level 100 


 


Carbon Dioxide Level 27.3 


 


Anion Gap 12 


 


Blood Urea Nitrogen 13 


 


Creatinine 1.29 


 


Estimat Glomerular Filtration 68 





Rate 


 


Random Glucose 292 


 


Calcium Level 8.1 


 


Total Bilirubin 1.2 


 


Aspartate Amino Transf 31 





(AST/SGOT) 


 


Alanine Aminotransferase 17 





(ALT/SGPT) 


 


Alkaline Phosphatase 76 


 


Total Creatine Kinase 225 


 


Troponin I LESS THAN 0.02 


 


Total Protein 6.5 


 


Albumin 3.2 


 


Lipase 4577 








Result Diagram:  


7/30/17 1315                                                                   

             7/30/17 1315








Assessment and Plan


Problem List:  


(1) Acute pancreatitis


ICD Code:  K85.90


Status:  Acute


(2) Chronic pancreatitis


ICD Code:  K86.1


Status:  Acute


Assessment and Plan





Assessment and Plan


66 year old male admitted with acute pancreatitis





Acute Pancreatitis


Hx of Chronic Pancreatitis


IV Hydration with NS at 150ml/hr


PRN pain treatments


Follow lipase level


monitor vital signs


Follow for fevers





DM2


Hyperglycemia


Follow blood sugars


Diabetic Diet


Insulin Sliding Scale





COPD


No exacerbation


Follow clinically





CKD3


Follow renal function


IV Hydration





HTN


Continue baseline treatments


Follow blood pressures





Chronic Back Pain


Narcotics are continued





Hx of Bradycardia


Follow on telemetry





DVT Prophylaxis


Lovenox





Discharge Planning


Needs improvement in pancreatitis prior to discharge





Physician Certification


2 Midnight Certification Type:  Admission for Inpatient Services


Order for Inpatient Services


The services are ordered in accordance with Medicare regulations or non-

Medicare payer requirements, as applicable.  In the case of services not 

specified as inpatient-only, they are appropriately provided as inpatient 

services in accordance with the 2-midnight benchmark.


Estimated LOS (days):  3


 days is the estimated time the patient will need to remain in the hospital, 

assuming treatment plan goals are met and no additional complications.


Post-Hospital Plan:  Home





Problem Qualifiers





(1) Acute pancreatitis:  


Qualified Code:  K85.20 - Alcohol-induced acute pancreatitis, unspecified 

complication status





Kade Cruz MD Jul 30, 2017 15:07

## 2017-07-30 NOTE — PD
HPI


Chief Complaint:  Abdominal Pain


Time Seen by Provider:  12:05


Travel History


International Travel<30 days:  No


Contact w/Intl Traveler<30days:  No


Traveled to known affect area:  No





History of Present Illness


HPI


66-year-old male presents for evaluation of epigastric abdominal pain.  

Symptoms started yesterday evening after drinking 4 beers.  The pain is a sharp 

pain, constant, associated with some nausea and vomiting this morning.  The 

pain is consistent with previous episodes of pancreatitis.  He denies chest pain

, fevers or chills, flank pain, dysuria.  He has no other complaints at this 

time.





PFSH


Past Medical History


Arthritis:  No


Asthma:  Yes


Autoimmune Disease:  No


Blood Disorders:  No


Anxiety:  Yes


Depression:  Yes


Heart Rhythm Problems:  Yes (pace maker placed to treat bradycardia)


Cancer:  No


Cardiac Catheterization:  Yes


Cardiovascular Problems:  Yes (PACER)


High Cholesterol:  No


Chemotherapy:  No


Chest Pain:  Yes (janessaen states that he some times has chest pain)


Congestive Heart Failure:  No


COPD:  Yes


Cerebrovascular Accident:  No


Diabetes:  Yes (TYPE II )


Patient Takes Glucophage:  Yes (PT STATES HE TAKES A PILL FOR HIS TYPE II 

DIABETIS )


Diminished Hearing:  No


Endocrine:  Yes


Gastrointestinal Disorders:  Yes (PANCREATITIS)


GERD:  Yes


Glaucoma:  No


Genitourinary:  Yes


Headaches:  No


Hepatitis:  No


Hiatal Hernia:  No


Heparin Induced Thrombocytopen:  No


Hypertension:  Yes


Immune Disorder:  No


Implanted Vascular Access Dvce:  Yes


Kidney Stones:  Yes


Musculoskeletal:  No


Neurologic:  No


Psychiatric:  No


Reproductive:  No


Respiratory:  Yes


Immunizations Current:  Yes


Migraines:  No


Myocardial Infarction:  No


Pancreatitis:  Yes


Pneumonia:  Yes


Radiation Therapy:  No


Renal Failure:  Yes


Seizures:  No


Sickle Cell Disease:  No


Sleep Apnea:  No


Thyroid Disease:  No


Ulcer:  No


PNEUMOCCOCAL Vaccine (Year):  2





Past Surgical History


Abdominal Surgery:  Yes (Gallbladder removed 1997. repair for same surgery)


AICD:  No


Arteriovenous Shunt:  No


Body Medical Devices:  PACEMAKER


Cardiac Surgery:  Yes (Pace maker 1997)


Cholecystectomy:  Yes


Ear Surgery:  No


Endocrine Surgery:  No


Eye Surgery:  No


Genitourinary Surgery:  No


Gynecologic Surgery:  No


Insulin Pump:  No


Joint Replacement:  No


Neurologic Surgery:  Yes (LAMINECTOMY L4/5)


Oral Surgery:  No


Pacemaker:  Yes (Biotronik)


Thoracic Surgery:  No


Other Surgery:  Yes (HAND SURGERY -RIGHT TENDON REPAIR)





Social History


Alcohol Use:  Yes (STATES QUIT)


Tobacco Use:  No (QUIT 2012)


Substance Use:  Yes (Cocaine, pt states that last time used is unknown)





Allergies-Medications


(Allergen,Severity, Reaction):  


Coded Allergies:  


     MRI PRECAUTION (Verified  Adverse Reaction, Severe, PACEMAKER, 6/29/17)


 PT HAS PACEMAKER PER [facility]URSE [facility]


     *MDRO Multi-Drug Resistant Organism (Verified  Adverse Reaction, Unknown, 6 /29/17)


 MRSA PCR (nares) negative - 5/21/16 & 5/24/16


 ***Cleared per Infection Control***


 


 MRSA (blood and urine) - 2/2013


Reported Meds & Prescriptions





Reported Meds & Active Scripts


Active


Zofran Odt (Ondansetron Odt) 4 Mg Tab 4 Mg SL Q6HR PRN


Reported


Roxicodone (Oxycodone HCl) 15 Mg Tab 15 Mg PO Q8H PRN


Flonase Nasal Spray (Fluticasone Nasal Spray) 50 Mcg/Act Spray 50 Mcg EACH NARE 

BID


Duoneb (Ipratropium-Albuterol Neb) 0.5-2.5 Mg/3 Ml Neb 1 Nebule INH Q4HR NEB


Pantoprazole (Pantoprazole Sodium) 20 Mg Tab 20 Mg PO DAILY


Proair Hfa 8.5 GM Inh (Albuterol Sulfate) 90 Mcg/Act Aer 2 Puff INH Q4-6H PRN


     108 mcg/actuation


Janumet (Sitagliptin-Metformin)  Mg Tab 1 Tab PO BID


Cialis (Tadalafil) 5 Mg Tab 5 Mg PO DAILY


     Do not exceed 1 dose/day.








Review of Systems


Except as stated in HPI:  all other systems reviewed are Neg





Physical Exam


Narrative


GENERAL: Well-developed well-nourished male who appears uncomfortable on 

initial examination


SKIN: Warm and dry.


HEAD: Atraumatic. Normocephalic. 


EYES: Pupils equal and round. No scleral icterus. No injection or drainage. 


ENT: No nasal bleeding or discharge.  Mucous membranes pink and moist.


NECK: Trachea midline. No JVD. 


CARDIOVASCULAR: Regular rate and rhythm.  No murmur appreciated.


RESPIRATORY: No accessory muscle use. Clear to auscultation. Breath sounds 

equal bilaterally. 


GASTROINTESTINAL: Abdomen soft, tender to palpation in the epigastrium without 

guarding


MUSCULOSKELETAL: No obvious deformities.  No edema. 


NEUROLOGICAL: Awake and alert. No obvious cranial nerve deficits.  Motor 

grossly within normal limits. Normal speech.


PSYCHIATRIC: Appropriate mood and affect; insight and judgment normal.





Data


Data


Last Documented VS





Vital Signs








  Date Time  Temp Pulse Resp B/P Pulse Ox O2 Delivery O2 Flow Rate FiO2


 


7/30/17 12:22     98   


 


7/30/17 11:57 98.3 65 23 157/100    








Orders





 Complete Blood Count With Diff (7/30/17 12:11)


Comprehensive Metabolic Panel (7/30/17 12:11)


Lipase (7/30/17 12:11)


Iv Access Insert/Monitor (7/30/17 12:11)


Ecg Monitoring (7/30/17 12:11)


Oximetry (7/30/17 12:11)


Morphine Inj (Morphine Inj) (7/30/17 12:15)


Ondansetron Inj (Zofran Inj) (7/30/17 12:15)


Sodium Chlor 0.9% 1000 Ml Inj (Ns 1000 M (7/30/17 12:11)


Sodium Chloride 0.9% Flush (Ns Flush) (7/30/17 12:15)


Electrocardiogram (7/30/17 12:11)


Creatine Kinase (Cpk) (7/30/17 12:11)


Troponin I (7/30/17 12:11)


Vascular Access Team Consult/P PRN (7/30/17 12:57)


Potassium Chloride (Kcl) (7/30/17 14:15)





Labs








 Laboratory Tests








Test 7/30/17





 13:15


 


White Blood Count 5.9 TH/MM3


 


Red Blood Count 4.59 MIL/MM3


 


Hemoglobin 14.8 GM/DL


 


Hematocrit 42.8 %


 


Mean Corpuscular Volume 93.4 FL


 


Mean Corpuscular Hemoglobin 32.2 PG


 


Mean Corpuscular Hemoglobin 34.4 %





Concent 


 


Red Cell Distribution Width 12.3 %


 


Platelet Count 144 TH/MM3


 


Mean Platelet Volume 8.6 FL


 


Neutrophils (%) (Auto) 66.7 %


 


Lymphocytes (%) (Auto) 21.7 %


 


Monocytes (%) (Auto) 10.7 %


 


Eosinophils (%) (Auto) 0.5 %


 


Basophils (%) (Auto) 0.4 %


 


Neutrophils # (Auto) 4.0 TH/MM3


 


Lymphocytes # (Auto) 1.3 TH/MM3


 


Monocytes # (Auto) 0.6 TH/MM3


 


Eosinophils # (Auto) 0.0 TH/MM3


 


Basophils # (Auto) 0.0 TH/MM3


 


CBC Comment DIFF FINAL 


 


Differential Comment  


 


Sodium Level 139 MEQ/L


 


Potassium Level 3.4 MEQ/L


 


Chloride Level 100 MEQ/L


 


Carbon Dioxide Level 27.3 MEQ/L


 


Anion Gap 12 MEQ/L


 


Blood Urea Nitrogen 13 MG/DL


 


Creatinine 1.29 MG/DL


 


Estimat Glomerular Filtration 68 ML/MIN





Rate 


 


Random Glucose 292 MG/DL


 


Calcium Level 8.1 MG/DL


 


Total Bilirubin 1.2 MG/DL


 


Aspartate Amino Transf 31 U/L





(AST/SGOT) 


 


Alanine Aminotransferase 17 U/L





(ALT/SGPT) 


 


Alkaline Phosphatase 76 U/L


 


Total Creatine Kinase 225 U/L


 


Troponin I LESS THAN 0.02





 NG/ML


 


Total Protein 6.5 GM/DL


 


Albumin 3.2 GM/DL


 


Lipase 4577 U/L














MDM


Medical Decision Making


Medical Screen Exam Complete:  Yes


Emergency Medical Condition:  Yes


Medical Record Reviewed:  Yes


Differential Diagnosis


Pancreatitis, acute coronary syndrome, aortic dissection, cholecystitis, 

ascending cholangitis, mesenteric ischemia, colitis, bowel obstruction


Narrative Course


66-year-old male presents with epigastric abdominal pain, nausea and vomiting 

started yesterday evening after drinking beer.  He has been seen here several 

times in the past for similar symptoms, diagnosed with pancreatitis, this feels 

similar.  Plan is for basic lab work, EKG, ECG monitor and pulse oximetry.  He 

will be given IV fluid, Zofran and morphine.





Upon reexamination he continues to have significant pain.  His lipase is 4500 

which is much higher than any of his previous visits.  At this point; be to 

admit the patient for pain control, hydration.  Discussed with Dr. Cruz who 

is agreeable.





Diagnosis





 Primary Impression:  


 Acute pancreatitis


 Qualified Code:  K85.20 - Alcohol-induced acute pancreatitis, unspecified 

complication status





Admitting Information


Admitting Physician Requests:  it








Mateo Warner Jul 30, 2017 12:14

## 2017-07-31 VITALS — DIASTOLIC BLOOD PRESSURE: 96 MMHG | HEART RATE: 79 BPM | SYSTOLIC BLOOD PRESSURE: 146 MMHG

## 2017-07-31 VITALS
SYSTOLIC BLOOD PRESSURE: 148 MMHG | DIASTOLIC BLOOD PRESSURE: 102 MMHG | HEART RATE: 75 BPM | OXYGEN SATURATION: 95 % | RESPIRATION RATE: 18 BRPM

## 2017-07-31 VITALS — HEART RATE: 65 BPM

## 2017-07-31 VITALS
TEMPERATURE: 96.7 F | HEART RATE: 83 BPM | SYSTOLIC BLOOD PRESSURE: 163 MMHG | DIASTOLIC BLOOD PRESSURE: 97 MMHG | RESPIRATION RATE: 21 BRPM | OXYGEN SATURATION: 95 %

## 2017-07-31 VITALS
HEART RATE: 78 BPM | TEMPERATURE: 96.6 F | DIASTOLIC BLOOD PRESSURE: 91 MMHG | SYSTOLIC BLOOD PRESSURE: 155 MMHG | OXYGEN SATURATION: 94 % | RESPIRATION RATE: 19 BRPM

## 2017-07-31 VITALS
SYSTOLIC BLOOD PRESSURE: 133 MMHG | DIASTOLIC BLOOD PRESSURE: 79 MMHG | HEART RATE: 73 BPM | RESPIRATION RATE: 18 BRPM | TEMPERATURE: 98.1 F | OXYGEN SATURATION: 97 %

## 2017-07-31 VITALS
HEART RATE: 62 BPM | DIASTOLIC BLOOD PRESSURE: 102 MMHG | TEMPERATURE: 96.5 F | OXYGEN SATURATION: 94 % | RESPIRATION RATE: 16 BRPM | SYSTOLIC BLOOD PRESSURE: 164 MMHG

## 2017-07-31 VITALS — HEART RATE: 64 BPM

## 2017-07-31 VITALS — DIASTOLIC BLOOD PRESSURE: 98 MMHG | SYSTOLIC BLOOD PRESSURE: 152 MMHG

## 2017-07-31 VITALS
OXYGEN SATURATION: 97 % | SYSTOLIC BLOOD PRESSURE: 164 MMHG | DIASTOLIC BLOOD PRESSURE: 98 MMHG | RESPIRATION RATE: 16 BRPM | HEART RATE: 61 BPM | TEMPERATURE: 96 F

## 2017-07-31 LAB
ALP SERPL-CCNC: 79 U/L (ref 45–117)
ALT SERPL-CCNC: 16 U/L (ref 12–78)
ANION GAP SERPL CALC-SCNC: 10 MEQ/L (ref 5–15)
AST SERPL-CCNC: 21 U/L (ref 15–37)
BASOPHILS # BLD AUTO: 0 TH/MM3 (ref 0–0.2)
BASOPHILS NFR BLD: 0.2 % (ref 0–2)
BILIRUB SERPL-MCNC: 2.3 MG/DL (ref 0.2–1)
BUN SERPL-MCNC: 8 MG/DL (ref 7–18)
CALCIUM TP COR SERPL-MCNC: 7.8 MG/DL (ref 8.5–10.1)
CHLORIDE SERPL-SCNC: 104 MEQ/L (ref 98–107)
EOSINOPHIL # BLD: 0 TH/MM3 (ref 0–0.4)
EOSINOPHIL NFR BLD: 0.1 % (ref 0–4)
ERYTHROCYTE [DISTWIDTH] IN BLOOD BY AUTOMATED COUNT: 12.3 % (ref 11.6–17.2)
GFR SERPLBLD BASED ON 1.73 SQ M-ARVRAT: 99 ML/MIN (ref 89–?)
HCO3 BLD-SCNC: 25.3 MEQ/L (ref 21–32)
HCT VFR BLD CALC: 42.6 % (ref 39–51)
HEMO FLAGS: (no result)
LYMPHOCYTES # BLD AUTO: 0.8 TH/MM3 (ref 1–4.8)
LYMPHOCYTES NFR BLD AUTO: 7.5 % (ref 9–44)
MCH RBC QN AUTO: 33.1 PG (ref 27–34)
MCHC RBC AUTO-ENTMCNC: 35.4 % (ref 32–36)
MCV RBC AUTO: 93.5 FL (ref 80–100)
MONOCYTES NFR BLD: 7.6 % (ref 0–8)
NEUTROPHILS # BLD AUTO: 8.8 TH/MM3 (ref 1.8–7.7)
NEUTROPHILS NFR BLD AUTO: 84.6 % (ref 16–70)
PLATELET # BLD: 107 TH/MM3 (ref 150–450)
POTASSIUM SERPL-SCNC: 3.4 MEQ/L (ref 3.5–5.1)
RBC # BLD AUTO: 4.56 MIL/MM3 (ref 4.5–5.9)
SODIUM SERPL-SCNC: 139 MEQ/L (ref 136–145)
WBC # BLD AUTO: 10.4 TH/MM3 (ref 4–11)

## 2017-07-31 RX ADMIN — MORPHINE SULFATE PRN MG: 8 INJECTION INTRAVENOUS at 08:21

## 2017-07-31 RX ADMIN — HYDROMORPHONE HYDROCHLORIDE PRN MG: 1 INJECTION, SOLUTION INTRAMUSCULAR; INTRAVENOUS; SUBCUTANEOUS at 04:01

## 2017-07-31 RX ADMIN — INSULIN ASPART SCH: 100 INJECTION, SOLUTION INTRAVENOUS; SUBCUTANEOUS at 06:06

## 2017-07-31 RX ADMIN — PHENYTOIN SODIUM SCH MLS/HR: 50 INJECTION INTRAMUSCULAR; INTRAVENOUS at 04:00

## 2017-07-31 RX ADMIN — IPRATROPIUM BROMIDE AND ALBUTEROL SULFATE SCH AMPULE: .5; 3 SOLUTION RESPIRATORY (INHALATION) at 00:37

## 2017-07-31 RX ADMIN — PANTOPRAZOLE SODIUM SCH MG: 20 TABLET, DELAYED RELEASE ORAL at 08:00

## 2017-07-31 RX ADMIN — MULTIPLE VITAMINS W/ MINERALS TAB SCH TAB: TAB at 10:08

## 2017-07-31 RX ADMIN — ONDANSETRON PRN MG: 2 INJECTION, SOLUTION INTRAMUSCULAR; INTRAVENOUS at 08:01

## 2017-07-31 RX ADMIN — MORPHINE SULFATE PRN MG: 8 INJECTION INTRAVENOUS at 20:23

## 2017-07-31 RX ADMIN — MORPHINE SULFATE PRN MG: 8 INJECTION INTRAVENOUS at 12:27

## 2017-07-31 RX ADMIN — HYDROMORPHONE HYDROCHLORIDE PRN MG: 1 INJECTION, SOLUTION INTRAMUSCULAR; INTRAVENOUS; SUBCUTANEOUS at 06:00

## 2017-07-31 RX ADMIN — ENOXAPARIN SODIUM SCH MG: 40 INJECTION SUBCUTANEOUS at 08:00

## 2017-07-31 RX ADMIN — PHENYTOIN SODIUM SCH MLS/HR: 50 INJECTION INTRAMUSCULAR; INTRAVENOUS at 10:28

## 2017-07-31 RX ADMIN — INSULIN ASPART SCH: 100 INJECTION, SOLUTION INTRAVENOUS; SUBCUTANEOUS at 20:28

## 2017-07-31 RX ADMIN — IPRATROPIUM BROMIDE AND ALBUTEROL SULFATE SCH AMPULE: .5; 3 SOLUTION RESPIRATORY (INHALATION) at 23:19

## 2017-07-31 RX ADMIN — IPRATROPIUM BROMIDE AND ALBUTEROL SULFATE SCH AMPULE: .5; 3 SOLUTION RESPIRATORY (INHALATION) at 08:12

## 2017-07-31 RX ADMIN — HYDROMORPHONE HYDROCHLORIDE PRN MG: 1 INJECTION, SOLUTION INTRAMUSCULAR; INTRAVENOUS; SUBCUTANEOUS at 10:09

## 2017-07-31 RX ADMIN — MORPHINE SULFATE PRN MG: 8 INJECTION INTRAVENOUS at 01:05

## 2017-07-31 RX ADMIN — INSULIN ASPART SCH: 100 INJECTION, SOLUTION INTRAVENOUS; SUBCUTANEOUS at 12:26

## 2017-07-31 RX ADMIN — ONDANSETRON PRN MG: 2 INJECTION, SOLUTION INTRAMUSCULAR; INTRAVENOUS at 00:28

## 2017-07-31 RX ADMIN — Medication SCH ML: at 20:23

## 2017-07-31 RX ADMIN — Medication SCH ML: at 09:00

## 2017-07-31 RX ADMIN — INSULIN ASPART SCH: 100 INJECTION, SOLUTION INTRAVENOUS; SUBCUTANEOUS at 16:43

## 2017-07-31 RX ADMIN — Medication SCH ML: at 10:10

## 2017-07-31 RX ADMIN — Medication SCH MG: at 10:09

## 2017-07-31 RX ADMIN — IPRATROPIUM BROMIDE AND ALBUTEROL SULFATE SCH AMPULE: .5; 3 SOLUTION RESPIRATORY (INHALATION) at 16:44

## 2017-07-31 RX ADMIN — STANDARDIZED SENNA CONCENTRATE AND DOCUSATE SODIUM SCH TAB: 8.6; 5 TABLET, FILM COATED ORAL at 20:23

## 2017-07-31 RX ADMIN — STANDARDIZED SENNA CONCENTRATE AND DOCUSATE SODIUM SCH TAB: 8.6; 5 TABLET, FILM COATED ORAL at 08:00

## 2017-07-31 RX ADMIN — FOLIC ACID SCH MG: 1 TABLET ORAL at 10:09

## 2017-07-31 RX ADMIN — PHENYTOIN SODIUM SCH MLS/HR: 50 INJECTION INTRAMUSCULAR; INTRAVENOUS at 20:23

## 2017-07-31 NOTE — EKG
Date Performed: 07/30/2017       Time Performed: 12:00:03

 

PTAGE:      66 years

 

EKG:      Sinus rhythm 

 

 WITH OCCASIONAL SUPRAVENTRICULAR PREMATURE COMPLEXES BORDERLINE LEFT AXIS DEVIATION NONSPECIFIC T-WA
VE ABNORMALITY BORDERLINE ECG Compared to prior tracing no significant change 

 

 PREVIOUS TRACING            : 02/07/2017 08.13

 

DOCTOR:   Tahir Fong  Interpretating Date/Time  07/31/2017 14:10:21

## 2017-07-31 NOTE — HHI.PR
Subjective


Remarks


Follow-up for acute pancreatitis secondary to alcoholism


Patient stated that he continues to have abdominal pain but that has improved.


Continues to feel nauseous and he stated that he had episodes of emesis last 

night with fluid intake.


Otherwise he remains afebrile.





Objective


Vitals





 Vital Signs








  Date Time  Temp Pulse Resp B/P Pulse Ox O2 Delivery O2 Flow Rate FiO2


 


7/31/17 08:00 96.5 62 16 164/102 94   


 


7/31/17 06:33  79  146/96    


 


7/31/17 04:00  83      


 


7/31/17 04:00 96.7 71 21 163/97 95   


 


7/31/17 00:00 96.6 78 19 155/91 94   


 


7/31/17 00:00  85      


 


7/30/17 20:00 96.4 61 22 186/99 98   


 


7/30/17 20:00  73      


 


7/30/17 18:49   18     


 


7/30/17 18:10 96.9 62 20 197/113 99   


 


7/30/17 17:32  60 19 151/95 96 Room Air  


 


7/30/17 12:22     98   


 


7/30/17 11:57 98.3 65 23 157/100 100   








 I/O








 7/30/17 7/30/17 7/30/17 7/31/17 7/31/17 7/31/17





 06:59 14:59 22:59 06:59 14:59 22:59


 


Intake Total   1430 ml 1227 ml  


 


Output Total    200 ml  


 


Balance   1430 ml 1027 ml  


 


      


 


Intake Oral   480 ml 240 ml  


 


IV Total   950 ml 987 ml  


 


Output Urine Total    200 ml  


 


# Voids   1   








Result Diagram:  


7/31/17 0643                                                                   

             7/31/17 0643





Objective Remarks


GENERAL: in NAD


SKIN: Warm and dry.


HEAD: Normocephalic.


EYES: No scleral icterus. No injection or drainage. 


NECK: Supple, trachea midline. No JVD or lymphadenopathy.


CARDIOVASCULAR: Regular rate and rhythm without murmurs, gallops, or rubs. 


RESPIRATORY: Breath sounds equal bilaterally. No accessory muscle use.


GASTROINTESTINAL: Abdomen soft, + TTP in epigastric area. nondistended.  

Negative for any peritoneal signs.








Medications and IVs





 Current Medications


Morphine Sulfate (Morphine Inj) 4 mg ONCE  ONCE IV PUSH  Last administered on 7/ 30/17at 13:00;  Start 7/30/17 at 12:15;  Stop 7/30/17 at 12:16;  Status DC


Ondansetron HCl 4 mg 4 mg ONCE  ONCE IVP  Last administered on 7/30/17at 13:00;

  Start 7/30/17 at 12:15;  Stop 7/30/17 at 12:16;  Status DC


Sodium Chloride (NS 1000 ml Inj) 1,000 ml @  1,000 mls/hr Q1H IV  Last 

administered on 7/30/17at 13:00;  Start 7/30/17 at 12:11;  Stop 7/30/17 at 13:10

;  Status DC


Sodium Chloride (NS Flush) 2 ml UNSCH  PRN IV FLUSH FLUSH AFTER USING IV ACCESS

;  Start 7/30/17 at 12:15


Potassium Chloride (KCl) 20 meq ONCE  ONCE PO  Last administered on 7/30/17at 14

:15;  Start 7/30/17 at 14:15;  Stop 7/30/17 at 14:16;  Status DC


Morphine Sulfate 4 mg 4 mg ONCE  ONCE IV PUSH  Last administered on 7/30/17at 15

:20;  Start 7/30/17 at 14:30;  Stop 7/30/17 at 14:31;  Status DC


Sodium Chloride (NS 1000 ml Inj) 1,000 ml @  150 mls/hr Q6H40M IV  Last 

administered on 7/31/17at 04:00;  Start 7/30/17 at 14:28


Sodium Chloride (NS Flush) 2 ml UNSCH  PRN IV FLUSH FLUSH AFTER USING IV ACCESS

;  Start 7/30/17 at 14:30


Sodium Chloride (NS Flush) 2 ml BID IV FLUSH  Last administered on 7/30/17at 20:

16;  Start 7/30/17 at 21:00


Ondansetron HCl (Zofran Inj) 4 mg Q6H  PRN IVP NAUSEA OR VOMITING Last 

administered on 7/31/17at 08:01;  Start 7/30/17 at 14:30


Acetaminophen (Tylenol) 650 mg Q6H  PRN PO PAIN SCALE 1 TO 2;  Start 7/30/17 at 

14:30


Acetaminophen/ Hydrocodone Bitart (Norco  5-325 Mg) 1 tab Q4H  PRN PO PAIN 

SCALE 3 TO 5;  Start 7/30/17 at 14:30;  Stop 7/30/17 at 14:52;  Status DC


Acetaminophen/ Hydrocodone Bitart (Norco  Mg) 1 tab Q4H  PRN PO PAIN 

SCALE 6 TO 10;  Start 7/30/17 at 14:30;  Stop 7/30/17 at 14:52;  Status DC


Naloxone HCl (Narcan Inj) 0.4 mg UNSCH  PRN IV SEE LABEL COMMENTS;  Start 7/30/ 17 at 14:30


Senna/Docusate Sodium (Olivia-Colace) 1 tab BID PO  Last administered on 7/31/ 17at 08:00;  Start 7/30/17 at 21:00


Magnesium Hydroxide (Milk Of Magnesia Liq) 30 ml Q12H  PRN PO MILD - MODERATE 

CONSTIPATION;  Start 7/30/17 at 14:30


Sennosides (Senokot) 17.2 mg Q12H  PRN PO MODERATE - SEVERE CONSTIPATION;  

Start 7/30/17 at 14:30


Bisacodyl (Dulcolax Supp) 10 mg DAILY  PRN RECTAL SEVERE CONSITIPATION;  Start 7 /30/17 at 14:30


Lactulose (Lactulose Liq) 30 ml DAILY  PRN PO SEVERE CONSITIPATION;  Start 7/30/ 17 at 14:30


Sodium Chloride (NS Flush) 2 ml UNSCH  PRN IV FLUSH FLUSH AFTER USING IV ACCESS

;  Start 7/30/17 at 14:30


Sodium Chloride (NS Flush) 2 ml BID IV FLUSH ;  Start 7/30/17 at 21:00


Folic Acid (Folate) 1 mg DAILY PO ;  Start 7/31/17 at 09:00;  Stop 8/5/17 at 08:

59


Thiamine HCl (Vitamin B1) 100 mg DAILY PO ;  Start 7/31/17 at 09:00


Multivitamins/ Minerals Therapeutic (Theragran M Tab) 1 tab DAILY PO ;  Start 7/ 31/17 at 09:00;  Stop 8/5/17 at 08:59


Flumazenil (Romazicon Inj) 0.2 mg Q1M  PRN IV PUSH SEE LABEL COMMENTS;  Start 7/ 30/17 at 14:30


Lorazepam (Ativan) 1 mg Q4H  PRN PO CIWA 8 - 10;  Start 7/30/17 at 14:30


Lorazepam (Ativan Inj) 1 mg Q4H  PRN IV PUSH CIWA 8 - 10;  Start 7/30/17 at 14:

30


Lorazepam (Ativan) 2 mg Q2H  PRN PO CIWA 11-14;  Start 7/30/17 at 14:30


Lorazepam (Ativan Inj) 2 mg Q2H  PRN IV PUSH CIWA 11-14;  Start 7/30/17 at 14:30


Lorazepam (Ativan Inj) 2 mg Q1H  PRN IV PUSH CIWA 15-20;  Start 7/30/17 at 14:30


Lorazepam (Ativan Inj) 2 mg Q15M  PRN IV PUSH CIWA > 20;  Start 7/30/17 at 14:30


Morphine Sulfate (Morphine Inj) 5 mg Q4H  PRN IV PUSH Pain 3 to 10 Last 

administered on 7/31/17at 08:21;  Start 7/30/17 at 15:00


Hydromorphone HCl (Dilaudid Pf Inj) 1 mg Q4H  PRN IV PUSH Breakthrough Pain 

Last administered on 7/31/17at 06:00;  Start 7/30/17 at 15:00


Dextrose (D50w (Vial) Inj) 50 ml UNSCH  PRN IV HYPOGLYCEMIA-SEE COMMENTS;  

Start 7/30/17 at 15:15


Glucagon (Glucagon Inj) 1 mg UNSCH  PRN OTHER HYPOGLYCEMIA-SEE COMMENTS;  Start 

7/30/17 at 15:15


Insulin Aspart (NovoLOG SUPPLEMENTAL SCALE) 1 ACHS SLIDING  SCALE SQ  Last 

administered on 7/31/17at 06:06;  Start 7/30/17 at 16:00


Enoxaparin Sodium (Lovenox Inj) 40 mg Q24H SQ  Last administered on 7/31/17at 08

:00;  Start 7/31/17 at 09:00


Albuterol Sulfate (Proair Hfa Inh) 2 puff Q4HR  PRN INH SHORTNESS OF BREATH;  

Start 7/30/17 at 15:15


Albuterol/ Ipratropium (Duoneb Neb) 1 ampule Q8HR  NEB INH  Last administered 

on 7/31/17at 08:12;  Start 7/30/17 at 22:00


Ondansetron HCl (Zofran  Odt) 4 mg Q6HR  PRN SL Nausea/Vomiting;  Start 7/30/17 

at 15:15


Pantoprazole Sodium (Protonix) 20 mg DAILY PO  Last administered on 7/31/17at 08

:00;  Start 7/31/17 at 09:00


Non-Formulary Medication 5 mg DAILY PO ERECTILE DYSFUNCTION;  Start 7/31/17 at 

09:00;  Stop 7/31/17 at 09:00;  Status DC


Clonidine (Catapres) 0.1 mg Q6H  PRN PO SBP>160, DBP>90 Last administered on 7/ 31/17at 04:05;  Start 7/30/17 at 15:15


Morphine Sulfate (Morphine Inj) 5 mg ONCE  ONCE IV PUSH ;  Start 7/30/17 at 15:

15;  Stop 7/30/17 at 15:16;  Status DC


Pneumococcal Polyvalent Vaccine (Pneumovax-23 Inj) 25 mcg ONCE ONCE IM ;  Start 

7/31/17 at 10:00;  Stop 7/31/17 at 10:01;  Status Cancel





A/P


Problem List:  


(1) Acute pancreatitis


ICD Code:  K85.90


Status:  Acute


(2) Chronic pancreatitis


ICD Code:  K86.1


Status:  Acute


Assessment and Plan


66 year old male admitted with acute pancreatitis





Acute Pancreatitis secondary to alcoholism


Hx of Chronic Pancreatitis


Patient continued to be symptomatic but has improved.


Continue with supportive care with antiemetics, IV fluids, and pain control.


Encourage patient not to drink alcohol anymore.








DM2


Patient on insulin sliding scale.





COPD


No exacerbation


Follow clinically





HTN


Continue with home medication.





Chronic Back Pain


Narcotics are continued





Hx of Bradycardia


Follow on telemetry





DVT Prophylaxis


Lovenox


Discharge Planning


Patient continues to be symptomatic so will need to continue with supportive 

care.





Problem Qualifiers





(1) Acute pancreatitis:  


Qualified Code:  K85.20 - Alcohol-induced acute pancreatitis, unspecified 

complication status





Liz Thornton MD Jul 31, 2017 08:57

## 2017-08-01 VITALS
OXYGEN SATURATION: 97 % | SYSTOLIC BLOOD PRESSURE: 134 MMHG | DIASTOLIC BLOOD PRESSURE: 79 MMHG | TEMPERATURE: 97.7 F | RESPIRATION RATE: 18 BRPM | HEART RATE: 70 BPM

## 2017-08-01 VITALS
TEMPERATURE: 97.9 F | DIASTOLIC BLOOD PRESSURE: 70 MMHG | OXYGEN SATURATION: 96 % | HEART RATE: 79 BPM | RESPIRATION RATE: 18 BRPM | SYSTOLIC BLOOD PRESSURE: 125 MMHG

## 2017-08-01 VITALS
OXYGEN SATURATION: 93 % | HEART RATE: 73 BPM | TEMPERATURE: 98.1 F | SYSTOLIC BLOOD PRESSURE: 138 MMHG | DIASTOLIC BLOOD PRESSURE: 79 MMHG | RESPIRATION RATE: 16 BRPM

## 2017-08-01 VITALS — HEART RATE: 86 BPM

## 2017-08-01 VITALS
DIASTOLIC BLOOD PRESSURE: 86 MMHG | SYSTOLIC BLOOD PRESSURE: 142 MMHG | HEART RATE: 75 BPM | TEMPERATURE: 98.5 F | RESPIRATION RATE: 18 BRPM | OXYGEN SATURATION: 95 %

## 2017-08-01 RX ADMIN — MORPHINE SULFATE PRN MG: 8 INJECTION INTRAVENOUS at 00:40

## 2017-08-01 RX ADMIN — PANTOPRAZOLE SODIUM SCH MG: 20 TABLET, DELAYED RELEASE ORAL at 09:08

## 2017-08-01 RX ADMIN — Medication SCH MG: at 09:08

## 2017-08-01 RX ADMIN — IPRATROPIUM BROMIDE AND ALBUTEROL SULFATE SCH AMPULE: .5; 3 SOLUTION RESPIRATORY (INHALATION) at 07:49

## 2017-08-01 RX ADMIN — FOLIC ACID SCH MG: 1 TABLET ORAL at 09:08

## 2017-08-01 RX ADMIN — PHENYTOIN SODIUM SCH MLS/HR: 50 INJECTION INTRAMUSCULAR; INTRAVENOUS at 00:40

## 2017-08-01 RX ADMIN — Medication SCH ML: at 09:09

## 2017-08-01 RX ADMIN — MORPHINE SULFATE PRN MG: 8 INJECTION INTRAVENOUS at 05:46

## 2017-08-01 RX ADMIN — MULTIPLE VITAMINS W/ MINERALS TAB SCH TAB: TAB at 09:08

## 2017-08-01 RX ADMIN — INSULIN ASPART SCH: 100 INJECTION, SOLUTION INTRAVENOUS; SUBCUTANEOUS at 05:50

## 2017-08-01 RX ADMIN — Medication SCH ML: at 08:46

## 2017-08-01 RX ADMIN — HYDROMORPHONE HYDROCHLORIDE PRN MG: 1 INJECTION, SOLUTION INTRAMUSCULAR; INTRAVENOUS; SUBCUTANEOUS at 09:13

## 2017-08-01 RX ADMIN — INSULIN ASPART SCH: 100 INJECTION, SOLUTION INTRAVENOUS; SUBCUTANEOUS at 11:50

## 2017-08-01 RX ADMIN — STANDARDIZED SENNA CONCENTRATE AND DOCUSATE SODIUM SCH TAB: 8.6; 5 TABLET, FILM COATED ORAL at 09:09

## 2017-08-01 RX ADMIN — PHENYTOIN SODIUM SCH MLS/HR: 50 INJECTION INTRAMUSCULAR; INTRAVENOUS at 05:16

## 2017-08-01 RX ADMIN — ENOXAPARIN SODIUM SCH MG: 40 INJECTION SUBCUTANEOUS at 09:09

## 2017-08-01 NOTE — HHI.PR
Subjective


Remarks


Patient tells me that his abdominal pain is a 5 out of 10.  He ordered some 

fruits, yogurt, banana for breakfast and hoping he can eat it and keep it down.

  Apparently last night he ate some salmon and broccoli and that made him throw 

up.  He is afraid to go home and then has to come back to worsening pain.  

However, he is hopeful that he can go home soon.





Objective


Vitals





 Vital Signs








  Date Time  Temp Pulse Resp B/P Pulse Ox O2 Delivery O2 Flow Rate FiO2


 


8/1/17 04:00  59      


 


8/1/17 04:00 98.1 73 16 138/79 93   


 


8/1/17 00:00  75      


 


8/1/17 00:00 98.5 76 18 142/86 95   


 


7/31/17 20:00  72      


 


7/31/17 20:00 98.1 73 18 133/79 97   


 


7/31/17 16:11  65      


 


7/31/17 16:00  75 18 148/102 95   


 


7/31/17 12:35   18     


 


7/31/17 12:02  64      


 


7/31/17 12:00 96.0 61 16 164/98 97   


 


7/31/17 10:45   18     








 I/O








 7/31/17 7/31/17 7/31/17 8/1/17 8/1/17 8/1/17





 07:00 15:00 23:00 07:00 15:00 23:00


 


Intake Total 1227 ml 540 ml 2457 ml 1900 ml  


 


Output Total 200 ml 500 ml 400 ml 800 ml  


 


Balance 1027 ml 40 ml 2057 ml 1100 ml  


 


      


 


Intake Oral 240 ml 540 ml 120 ml 900 ml  


 


IV Total 987 ml  2337 ml 1000 ml  


 


Output Urine Total 200 ml 500 ml 400 ml 800 ml  


 


# Voids    2  


 


# Bowel Movements    0  








Result Diagram:  


7/31/17 0643                                                                   

             7/31/17 0643





Objective Remarks


GENERAL: Sitting up in bed and appears comfortable


SKIN: Warm and dry.


HEAD: Normocephalic.


EYES: Extraocular motion intact.. 


NECK: Supple, trachea midline. 


CARDIOVASCULAR: Regular rate and rhythm without murmurs  


RESPIRATORY: Breath sounds equal bilaterally. No accessory muscle use.


GASTROINTESTINAL: Abdomen soft, some discomfort with deep palpation in 

epigastric area. nondistended.  Negative for any peritoneal signs.





A/P


Problem List:  


(1) Acute pancreatitis


ICD Code:  K85.90


Status:  Acute


(2) Chronic pancreatitis


ICD Code:  K86.1


Status:  Acute


Assessment and Plan


Acute Pancreatitis secondary to alcoholism


Hx of Chronic Pancreatitis


Patient continued to be symptomatic but has improved.  Ordering breakfast.  

Feels hungry.


Continue with supportive care with antiemetics, IV fluids, and pain control.  

DC IV morphine and Dilaudid and will switch to pills.


Encourage patient not to drink alcohol anymore.  He states he will quit 

drinking.





DM2


Patient on insulin sliding scale.





COPD


No exacerbation


Follow clinically





HTN


Continue with home medication.





Chronic Back Pain


Narcotics are continued





Hx of Bradycardia


Follow on telemetry





DVT Prophylaxis


Lovenox


Discharge Planning


If patient tolerating breakfast and lunch and pain is better controlled with 

pills, will discharge later today.





Problem Qualifiers





(1) Acute pancreatitis:  


Qualified Code:  K85.20 - Alcohol-induced acute pancreatitis, unspecified 

complication status





Lo Hobbs MD Aug 1, 2017 09:19

## 2017-11-17 ENCOUNTER — HOSPITAL ENCOUNTER (OUTPATIENT)
Dept: HOSPITAL 17 - PHSDC | Age: 67
End: 2017-11-17
Attending: INTERNAL MEDICINE
Payer: COMMERCIAL

## 2017-11-17 VITALS
DIASTOLIC BLOOD PRESSURE: 80 MMHG | HEART RATE: 80 BPM | RESPIRATION RATE: 16 BRPM | TEMPERATURE: 97 F | OXYGEN SATURATION: 97 % | SYSTOLIC BLOOD PRESSURE: 147 MMHG

## 2017-11-17 DIAGNOSIS — K57.30: ICD-10-CM

## 2017-11-17 DIAGNOSIS — K21.0: ICD-10-CM

## 2017-11-17 DIAGNOSIS — K64.8: ICD-10-CM

## 2017-11-17 DIAGNOSIS — Z95.0: ICD-10-CM

## 2017-11-17 DIAGNOSIS — Z12.11: Primary | ICD-10-CM

## 2017-11-17 DIAGNOSIS — K29.50: ICD-10-CM

## 2017-11-17 DIAGNOSIS — E11.9: ICD-10-CM

## 2017-11-17 DIAGNOSIS — K86.1: ICD-10-CM

## 2017-11-17 DIAGNOSIS — I10: ICD-10-CM

## 2017-11-17 PROCEDURE — 82948 REAGENT STRIP/BLOOD GLUCOSE: CPT

## 2017-11-17 PROCEDURE — 43242 EGD US FINE NEEDLE BX/ASPIR: CPT

## 2017-11-17 PROCEDURE — 88312 SPECIAL STAINS GROUP 1: CPT

## 2017-11-17 PROCEDURE — 00810: CPT

## 2017-11-17 PROCEDURE — 45378 DIAGNOSTIC COLONOSCOPY: CPT

## 2017-11-17 PROCEDURE — 43259 EGD US EXAM DUODENUM/JEJUNUM: CPT

## 2017-11-17 PROCEDURE — 88305 TISSUE EXAM BY PATHOLOGIST: CPT

## 2018-04-22 ENCOUNTER — HOSPITAL ENCOUNTER (EMERGENCY)
Dept: HOSPITAL 17 - NEPC | Age: 68
LOS: 1 days | Discharge: HOME | End: 2018-04-23
Payer: COMMERCIAL

## 2018-04-22 VITALS — WEIGHT: 165.35 LBS | HEIGHT: 69 IN | BODY MASS INDEX: 24.49 KG/M2

## 2018-04-22 VITALS
TEMPERATURE: 97.9 F | RESPIRATION RATE: 16 BRPM | OXYGEN SATURATION: 98 % | SYSTOLIC BLOOD PRESSURE: 112 MMHG | DIASTOLIC BLOOD PRESSURE: 70 MMHG | HEART RATE: 102 BPM

## 2018-04-22 DIAGNOSIS — Z87.891: ICD-10-CM

## 2018-04-22 DIAGNOSIS — I10: ICD-10-CM

## 2018-04-22 DIAGNOSIS — Z91.14: ICD-10-CM

## 2018-04-22 DIAGNOSIS — Z95.0: ICD-10-CM

## 2018-04-22 DIAGNOSIS — Z87.442: ICD-10-CM

## 2018-04-22 DIAGNOSIS — K21.9: ICD-10-CM

## 2018-04-22 DIAGNOSIS — Z90.49: ICD-10-CM

## 2018-04-22 DIAGNOSIS — R00.1: ICD-10-CM

## 2018-04-22 DIAGNOSIS — J44.9: ICD-10-CM

## 2018-04-22 DIAGNOSIS — J84.10: ICD-10-CM

## 2018-04-22 DIAGNOSIS — R94.31: ICD-10-CM

## 2018-04-22 DIAGNOSIS — Z79.4: ICD-10-CM

## 2018-04-22 DIAGNOSIS — R10.9: ICD-10-CM

## 2018-04-22 DIAGNOSIS — E11.65: Primary | ICD-10-CM

## 2018-04-22 LAB
ALBUMIN SERPL-MCNC: 3.9 GM/DL (ref 3.4–5)
ALP SERPL-CCNC: 106 U/L (ref 45–117)
ALT SERPL-CCNC: 12 U/L (ref 12–78)
AST SERPL-CCNC: 11 U/L (ref 15–37)
BASOPHILS # BLD AUTO: 0 TH/MM3 (ref 0–0.2)
BASOPHILS NFR BLD: 0.7 % (ref 0–2)
BILIRUB SERPL-MCNC: 0.7 MG/DL (ref 0.2–1)
BUN SERPL-MCNC: 29 MG/DL (ref 7–18)
CALCIUM SERPL-MCNC: 8.8 MG/DL (ref 8.5–10.1)
CHLORIDE SERPL-SCNC: 95 MEQ/L (ref 98–107)
COLOR UR: (no result)
CREAT SERPL-MCNC: 1.84 MG/DL (ref 0.6–1.3)
EOSINOPHIL # BLD: 0.2 TH/MM3 (ref 0–0.4)
EOSINOPHIL NFR BLD: 2.6 % (ref 0–4)
ERYTHROCYTE [DISTWIDTH] IN BLOOD BY AUTOMATED COUNT: 13.3 % (ref 11.6–17.2)
GFR SERPLBLD BASED ON 1.73 SQ M-ARVRAT: 45 ML/MIN (ref 89–?)
GLUCOSE SERPL-MCNC: 570 MG/DL (ref 74–106)
GLUCOSE UR STRIP-MCNC: 1000 MG/DL
HCO3 BLD-SCNC: 23.9 MEQ/L (ref 21–32)
HCT VFR BLD CALC: 52.3 % (ref 39–51)
HGB BLD-MCNC: 17.8 GM/DL (ref 13–17)
HGB UR QL STRIP: (no result)
KETONES UR STRIP-MCNC: (no result) MG/DL
LYMPHOCYTES # BLD AUTO: 1.7 TH/MM3 (ref 1–4.8)
LYMPHOCYTES NFR BLD AUTO: 25.4 % (ref 9–44)
MCH RBC QN AUTO: 30.5 PG (ref 27–34)
MCHC RBC AUTO-ENTMCNC: 34 % (ref 32–36)
MCV RBC AUTO: 89.8 FL (ref 80–100)
MONOCYTE #: 0.6 TH/MM3 (ref 0–0.9)
MONOCYTES NFR BLD: 9.3 % (ref 0–8)
NEUTROPHILS # BLD AUTO: 4.2 TH/MM3 (ref 1.8–7.7)
NEUTROPHILS NFR BLD AUTO: 62 % (ref 16–70)
NITRITE UR QL STRIP: (no result)
PLATELET # BLD: 146 TH/MM3 (ref 150–450)
PMV BLD AUTO: 10.2 FL (ref 7–11)
PROT SERPL-MCNC: 8.3 GM/DL (ref 6.4–8.2)
RBC # BLD AUTO: 5.82 MIL/MM3 (ref 4.5–5.9)
SODIUM SERPL-SCNC: 129 MEQ/L (ref 136–145)
SP GR UR STRIP: 1.03 (ref 1–1.03)
URINE LEUKOCYTE ESTERASE: (no result)
WBC # BLD AUTO: 6.7 TH/MM3 (ref 4–11)

## 2018-04-22 PROCEDURE — 80053 COMPREHEN METABOLIC PANEL: CPT

## 2018-04-22 PROCEDURE — 99285 EMERGENCY DEPT VISIT HI MDM: CPT

## 2018-04-22 PROCEDURE — 96374 THER/PROPH/DIAG INJ IV PUSH: CPT

## 2018-04-22 PROCEDURE — 96361 HYDRATE IV INFUSION ADD-ON: CPT

## 2018-04-22 PROCEDURE — 93005 ELECTROCARDIOGRAM TRACING: CPT

## 2018-04-22 PROCEDURE — 96375 TX/PRO/DX INJ NEW DRUG ADDON: CPT

## 2018-04-22 PROCEDURE — 81001 URINALYSIS AUTO W/SCOPE: CPT

## 2018-04-22 PROCEDURE — 85025 COMPLETE CBC W/AUTO DIFF WBC: CPT

## 2018-04-22 PROCEDURE — 83690 ASSAY OF LIPASE: CPT

## 2018-04-22 PROCEDURE — 82010 KETONE BODYS QUAN: CPT

## 2018-04-22 PROCEDURE — 74176 CT ABD & PELVIS W/O CONTRAST: CPT

## 2018-04-22 RX ADMIN — Medication PRN ML: at 22:06

## 2018-04-22 RX ADMIN — Medication PRN ML: at 22:59

## 2018-04-22 NOTE — PD
HPI


Chief Complaint:  Diabetic


Time Seen by Provider:  21:25


Travel History


International Travel<30 days:  No


Contact w/Intl Traveler<30days:  No


Traveled to known affect area:  No





History of Present Illness


HPI


67-year-old male with history of type 2 diabetes presents for evaluation of 

elevated blood sugar and abdominal pain.  He reports that 3 days ago he went to 

a birthday party and admits to some cocaine use.  He has not been checking his 

blood sugar since then.  Today he did check his blood sugar and it was elevated 

at 566.  He has not been using his prescribed Lantus, Janumet or bydurion for 

the past 3 days.  He reports polyuria and polydipsia.  He reports abdominal 

pain which started this morning.  He describes it as a sharp periumbilical pain 

which is constant with associated nausea and decreased energy level.  He 

reports that he has not had normal bowel movements.  He denies dysuria, 

testicular scrotal pain, flank pain, chest pain or shortness of breath.  He 

reports a history of cholecystectomy and per chart review he has been seen here 

several times in the past with pancreatitis.  He has no other complaints at 

this time.





PFSH


Past Medical History


Arthritis:  No


Asthma:  Yes


Autoimmune Disease:  No


Blood Disorders:  No


Anxiety:  Yes


Depression:  Yes


Heart Rhythm Problems:  Yes (pace maker placed to treat bradycardia)


Cancer:  No


Cardiac Catheterization:  Yes


Cardiovascular Problems:  Yes (PACER)


High Cholesterol:  No


Chemotherapy:  No


Chest Pain:  Yes (briseyda states that he some times has chest pain)


Congestive Heart Failure:  No


COPD:  Yes


Cerebrovascular Accident:  No


Diabetes:  Yes


Diminished Hearing:  No


Endocrine:  Yes


Gastrointestinal Disorders:  Yes (PANCREATITIS)


GERD:  Yes


Glaucoma:  No


Genitourinary:  No


Headaches:  No


Hepatitis:  No


Hiatal Hernia:  No


Heparin Induced Thrombocytopen:  No


Hypertension:  Yes


Immune Disorder:  No


Implanted Vascular Access Dvce:  Yes


Kidney Stones:  Yes


Musculoskeletal:  Yes (OA, L4-L5 HERNIATED DISC REMOVED)


Neurologic:  No


Psychiatric:  No


Reproductive:  No


Respiratory:  Yes


Immunizations Current:  Yes


Migraines:  No


Myocardial Infarction:  No


Pancreatitis:  Yes


Pneumonia:  Yes


Radiation Therapy:  No


Renal Failure:  Yes


Seizures:  No


Sickle Cell Disease:  No


Sleep Apnea:  No


Thyroid Disease:  No


Ulcer:  No


PNEUMOCCOCAL Vaccine (Year):  2





Past Surgical History


Abdominal Surgery:  Yes (Gallbladder removed 1997)


AICD:  No


Arteriovenous Shunt:  No


Body Medical Devices:  PACEMAKER,


Cardiac Surgery:  Yes (Pace maker 1997)


Cholecystectomy:  Yes


Ear Surgery:  No


Endocrine Surgery:  No


Eye Surgery:  No


Genitourinary Surgery:  No


Gynecologic Surgery:  No


Insulin Pump:  No


Joint Replacement:  No


Neurologic Surgery:  Yes (LAMINECTOMY L4/5)


Oral Surgery:  No


Pacemaker:  Yes (Biotronik )


Thoracic Surgery:  No


Other Surgery:  Yes (HAND SURGERY -RIGHT TENDON REPAIR)





Social History


Alcohol Use:  Yes (STATES QUIT)


Tobacco Use:  No (QUIT 2012)


Substance Use:  No





Allergies-Medications


(Allergen,Severity, Reaction):  


Coded Allergies:  


     No Known Drug Allergies (Verified  Allergy, Unknown, 4/22/18)


     MRI PRECAUTION (Verified  Adverse Reaction, Severe, PACEMAKER, 11/16/17)


 PT HAS PACEMAKER PER NURSE PARISH/[facility]


     *MDRO Multi-Drug Resistant Organism (Verified  Adverse Reaction, Unknown, 

Cleared - 5/24/16, 11/16/17)


 MRSA PCR (nares) negative - 5/21/16 & 5/24/16


 ***Cleared per Infection Control***


 


 MRSA (blood and urine) - 2/2013


Reported Meds & Prescriptions





Reported Meds & Active Scripts


Active


Reported


Aspirin EC (Aspirin) 81 Mg Tabdr 81 Mg PO DAILY


Dilaudid (Hydromorphone HCl) 8 Mg Tab 8 Mg PO TID PRN


Lantus Solostar Pen Inj (Insulin Glargine) 300 Unit/3 Ml Pen 24 Units SQ BID


Lisinopril 20 Mg Tab 20 Mg PO BID


Clonidine (Clonidine HCl) 0.1 Mg Tab 0.1 Mg PO DAILY


Flonase Nasal Spray (Fluticasone Nasal Spray) 50 Mcg/Act Spray 50 Mcg EACH NARE 

BID


Duoneb (Ipratropium-Albuterol Neb) 0.5-2.5 Mg/3 Ml Neb 1 Nebule INH Q4HR NEB


Proair Hfa 8.5 GM Inh (Albuterol Sulfate) 90 Mcg/Act Aer 2 Puff INH Q4-6H PRN


     108 mcg/actuation


Janumet (Sitagliptin-Metformin)  Mg Tab 1 Tab PO BID


Cialis (Tadalafil) 5 Mg Tab 5 Mg PO DAILY


     Do not exceed 1 dose/day.








Review of Systems


Except as stated in HPI:  all other systems reviewed are Neg





Physical Exam


Narrative


GENERAL: Well-developed well-nourished male in no acute distress.


SKIN: Warm and dry.


HEAD: Atraumatic. Normocephalic. 


EYES: Pupils equal and round. No scleral icterus. No injection or drainage. 


ENT: No nasal bleeding or discharge.  Mucous membranes pink and moist.


NECK: Trachea midline. No JVD. 


CARDIOVASCULAR: Regular rate and rhythm.  No murmur appreciated.


RESPIRATORY: No accessory muscle use. Clear to auscultation. Breath sounds 

equal bilaterally. 


GASTROINTESTINAL: Abdomen soft, there is mild generalized tenderness without 

guarding.  There is a midline abdominal scar noted.


MUSCULOSKELETAL: No obvious deformities. No clubbing.  No cyanosis.  No edema. 


NEUROLOGICAL: Awake and alert. No obvious cranial nerve deficits.  Motor 

grossly within normal limits. Normal speech.


PSYCHIATRIC: Appropriate mood and affect; insight and judgment normal.





Data


Data


Last Documented VS





Vital Signs








  Date Time  Temp Pulse Resp B/P (MAP) Pulse Ox O2 Delivery O2 Flow Rate FiO2


 


4/23/18 02:10  89  161/90 (113) 96   


 


4/22/18 21:07 97.9  16   Room Air  








Orders





 Orders


Complete Blood Count With Diff (4/22/18 21:40)


Comprehensive Metabolic Panel (4/22/18 21:40)


Lipase (4/22/18 21:40)


Urinalysis - C+S If Indicated (4/22/18 21:40)


Iv Access Insert/Monitor (4/22/18 21:40)


Ecg Monitoring (4/22/18 21:40)


Oximetry (4/22/18 21:40)


Ondansetron Inj (Zofran Inj) (4/22/18 21:45)


Sodium Chlor 0.9% 1000 Ml Inj (Ns 1000 M (4/22/18 21:40)


Sodium Chloride 0.9% Flush (Ns Flush) (4/22/18 21:45)


Electrocardiogram (4/22/18 21:40)


Blood Glucose (4/22/18 21:40)


Beta Hydroxybutyrate (Acetone) (4/22/18 21:40)


Insulin Human Regular Inj (Novolin R Inj (4/22/18 22:45)


Morphine Inj (Morphine Inj) (4/22/18 23:00)


Sodium Chlor 0.9% 1000 Ml Inj (Ns 1000 M (4/22/18 23:02)


Ct Abd/Pel W/O Iv Contrast (4/22/18 21:40)


Blood Glucose (4/23/18 01:33)


Ed Discharge Order (4/23/18 02:45)





Labs





Laboratory Tests








Test


  4/22/18


21:55


 


White Blood Count 6.7 TH/MM3 


 


Red Blood Count 5.82 MIL/MM3 


 


Hemoglobin 17.8 GM/DL 


 


Hematocrit 52.3 % 


 


Mean Corpuscular Volume 89.8 FL 


 


Mean Corpuscular Hemoglobin 30.5 PG 


 


Mean Corpuscular Hemoglobin


Concent 34.0 % 


 


 


Red Cell Distribution Width 13.3 % 


 


Platelet Count 146 TH/MM3 


 


Mean Platelet Volume 10.2 FL 


 


Neutrophils (%) (Auto) 62.0 % 


 


Lymphocytes (%) (Auto) 25.4 % 


 


Monocytes (%) (Auto) 9.3 % 


 


Eosinophils (%) (Auto) 2.6 % 


 


Basophils (%) (Auto) 0.7 % 


 


Neutrophils # (Auto) 4.2 TH/MM3 


 


Lymphocytes # (Auto) 1.7 TH/MM3 


 


Monocytes # (Auto) 0.6 TH/MM3 


 


Eosinophils # (Auto) 0.2 TH/MM3 


 


Basophils # (Auto) 0.0 TH/MM3 


 


CBC Comment DIFF FINAL 


 


Differential Comment  


 


Urine Color LIGHT-YELLOW 


 


Urine Turbidity CLEAR 


 


Urine pH 5.5 


 


Urine Specific Gravity 1.026 


 


Urine Protein NEG mg/dL 


 


Urine Glucose (UA) 1000 mg/dL 


 


Urine Ketones NEG mg/dL 


 


Urine Occult Blood NEG 


 


Urine Nitrite NEG 


 


Urine Bilirubin NEG 


 


Urine Urobilinogen


  LESS THAN 2.0


MG/DL


 


Urine Leukocyte Esterase NEG 


 


Urine WBC 1 /hpf 


 


Microscopic Urinalysis Comment


  CULT NOT


INDICATED


 


Blood Urea Nitrogen 29 MG/DL 


 


Creatinine 1.84 MG/DL 


 


Random Glucose 570 MG/DL 


 


Total Protein 8.3 GM/DL 


 


Albumin 3.9 GM/DL 


 


Calcium Level 8.8 MG/DL 


 


Alkaline Phosphatase 106 U/L 


 


Aspartate Amino Transf


(AST/SGOT) 11 U/L 


 


 


Alanine Aminotransferase


(ALT/SGPT) 12 U/L 


 


 


Total Bilirubin 0.7 MG/DL 


 


Sodium Level 129 MEQ/L 


 


Potassium Level 4.9 MEQ/L 


 


Chloride Level 95 MEQ/L 


 


Carbon Dioxide Level 23.9 MEQ/L 


 


Anion Gap 10 MEQ/L 


 


Estimat Glomerular Filtration


Rate 45 ML/MIN 


 


 


Lipase 436 U/L 


 


B-Hydroxybutyrate 0.15 MMOL/L 











MDM


Medical Decision Making


Medical Screen Exam Complete:  Yes


Emergency Medical Condition:  Yes


Medical Record Reviewed:  Yes


Differential Diagnosis


Medication noncompliance, hyperglycemia, DKA, pancreatitis, colitis, 

diverticulitis


Narrative Course


Patient was placed on ECG monitoring and pulse oximetry.  12-lead EKG was 

obtained revealing sinus rhythm with rate of 89, lab work, urinalysis, CT 

abdomen and pelvis has been ordered.


CBC reveals a hemoglobin of 7.8, platelet count 146 otherwise unremarkable.  

CMP reveals a glucose of 570, GFR of 45, pseudohyponatremia with a sodium of 129

, chloride 95, beta hydroxybutyrate is 0.15, anion gap is normal.  Urinalysis 

reveals glucose 1000.  The patient will be given insulin bolus.





At the end of my shift the patient was signed out to Dr. Medina pending CT 

imaging and lipase.  IV morphine administered.  Additional liter fluid bolus 

administered.











Mateo Warner Apr 22, 2018 21:44

## 2018-04-23 VITALS — SYSTOLIC BLOOD PRESSURE: 161 MMHG | DIASTOLIC BLOOD PRESSURE: 90 MMHG

## 2018-04-23 NOTE — PD
Data


Data


Last Documented VS





Vital Signs








  Date Time  Temp Pulse Resp B/P (MAP) Pulse Ox O2 Delivery O2 Flow Rate FiO2


 


4/23/18 02:10  89  161/90 (113) 96   


 


4/22/18 21:07 97.9  16   Room Air  








Orders





 Orders


Complete Blood Count With Diff (4/22/18 21:40)


Comprehensive Metabolic Panel (4/22/18 21:40)


Lipase (4/22/18 21:40)


Urinalysis - C+S If Indicated (4/22/18 21:40)


Iv Access Insert/Monitor (4/22/18 21:40)


Ecg Monitoring (4/22/18 21:40)


Oximetry (4/22/18 21:40)


Ondansetron Inj (Zofran Inj) (4/22/18 21:45)


Sodium Chlor 0.9% 1000 Ml Inj (Ns 1000 M (4/22/18 21:40)


Sodium Chloride 0.9% Flush (Ns Flush) (4/22/18 21:45)


Electrocardiogram (4/22/18 21:40)


Blood Glucose (4/22/18 21:40)


Beta Hydroxybutyrate (Acetone) (4/22/18 21:40)


Insulin Human Regular Inj (Novolin R Inj (4/22/18 22:45)


Morphine Inj (Morphine Inj) (4/22/18 23:00)


Sodium Chlor 0.9% 1000 Ml Inj (Ns 1000 M (4/22/18 23:02)


Ct Abd/Pel W/O Iv Contrast (4/22/18 21:40)


Blood Glucose (4/23/18 01:33)


Ed Discharge Order (4/23/18 02:45)





Labs





Laboratory Tests








Test


  4/22/18


21:55


 


White Blood Count 6.7 TH/MM3 


 


Red Blood Count 5.82 MIL/MM3 


 


Hemoglobin 17.8 GM/DL 


 


Hematocrit 52.3 % 


 


Mean Corpuscular Volume 89.8 FL 


 


Mean Corpuscular Hemoglobin 30.5 PG 


 


Mean Corpuscular Hemoglobin


Concent 34.0 % 


 


 


Red Cell Distribution Width 13.3 % 


 


Platelet Count 146 TH/MM3 


 


Mean Platelet Volume 10.2 FL 


 


Neutrophils (%) (Auto) 62.0 % 


 


Lymphocytes (%) (Auto) 25.4 % 


 


Monocytes (%) (Auto) 9.3 % 


 


Eosinophils (%) (Auto) 2.6 % 


 


Basophils (%) (Auto) 0.7 % 


 


Neutrophils # (Auto) 4.2 TH/MM3 


 


Lymphocytes # (Auto) 1.7 TH/MM3 


 


Monocytes # (Auto) 0.6 TH/MM3 


 


Eosinophils # (Auto) 0.2 TH/MM3 


 


Basophils # (Auto) 0.0 TH/MM3 


 


CBC Comment DIFF FINAL 


 


Differential Comment  


 


Urine Color LIGHT-YELLOW 


 


Urine Turbidity CLEAR 


 


Urine pH 5.5 


 


Urine Specific Gravity 1.026 


 


Urine Protein NEG mg/dL 


 


Urine Glucose (UA) 1000 mg/dL 


 


Urine Ketones NEG mg/dL 


 


Urine Occult Blood NEG 


 


Urine Nitrite NEG 


 


Urine Bilirubin NEG 


 


Urine Urobilinogen


  LESS THAN 2.0


MG/DL


 


Urine Leukocyte Esterase NEG 


 


Urine WBC 1 /hpf 


 


Microscopic Urinalysis Comment


  CULT NOT


INDICATED


 


Blood Urea Nitrogen 29 MG/DL 


 


Creatinine 1.84 MG/DL 


 


Random Glucose 570 MG/DL 


 


Total Protein 8.3 GM/DL 


 


Albumin 3.9 GM/DL 


 


Calcium Level 8.8 MG/DL 


 


Alkaline Phosphatase 106 U/L 


 


Aspartate Amino Transf


(AST/SGOT) 11 U/L 


 


 


Alanine Aminotransferase


(ALT/SGPT) 12 U/L 


 


 


Total Bilirubin 0.7 MG/DL 


 


Sodium Level 129 MEQ/L 


 


Potassium Level 4.9 MEQ/L 


 


Chloride Level 95 MEQ/L 


 


Carbon Dioxide Level 23.9 MEQ/L 


 


Anion Gap 10 MEQ/L 


 


Estimat Glomerular Filtration


Rate 45 ML/MIN 


 


 


Lipase 436 U/L 


 


B-Hydroxybutyrate 0.15 MMOL/L 











[facility]


Medical Record Reviewed:  Yes


Supervised Visit with PHUONG:  No


Interpretation(s)





Last Impressions








Abdomen/Pelvis CT 4/22/18 2140 Signed





Impressions: 





 Service Date/Time:  Monday, April 23, 2018 00:20 - CONCLUSION:  1. Slight 





 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 Traction 





 bronchiectasis present. 2. Previous bowel anastomosis without obstruction, 

free 





 fluid or free air. Extensive colonic diverticulosis.  Previous cholecystectomy 





 with pneumobilia, stable. Stable pancreatic ductal dilatation to about 7 mm.  

   





 Branden Salgado MD 








Differential Diagnosis


DKA, versus hyperglycemic hyperosmolality, versus poorly controlled blood sugar 

related to medication noncompliance


Narrative Course


During the course of the patient's emergency department visit, the patient's 

history, examination, and differential diagnosis were reviewed with the 

patient. The patient was placed on a cardiac monitor with oximetry and frequent 

blood pressure monitoring. The patient had  IV access obtained and blood work 

sent for analysis.  The patient's case was checked out to me by Mateo, the 

physician assistant at the conclusion of his shift.  The patient was pending CT 

scan reading and lipase result.  The patient had presented with a history of 

celebrating a birthday recently and medication noncompliance with an elevated 

blood sugar prior to arrival.





The patient was initially provided normal saline IV fluids.  The patient was 

given morphine for pain, Zofran for nausea.  The patient was given insulin for 

improvement of his blood sugar.  These medications were ordered to be 

administered by Mateo the physician assistant.





The patient's laboratory studies were reviewed and remarkable for white count 

of 6.7, hemoglobin 17.8, platelets 146 with 9.3- monocytes, CMP is remarkable 

for sodium of 129, chloride 95, BUN 29, creatinine 1.84, glucose 570, AST 11, 

total protein 8.3, lipase 436, improved compared to her prior level of 1364 on 

July 31, 2017, beta hydroxybutyrate 0.15, urinalysis showed 1000 glucose 

otherwise unremarkable.





Radiology studies were reviewed and remarkable for a CT scan of the abdomen and 

pelvis that showed slight progression and basilar pulmonary fibrosis since 

February 2017, traction bronchiectasis present, previous bowel anastomosis 

without obstruction, free fluid, or free air.  Extensive colonic 

diverticulosis.  Previous cholecystectomy with pneumobilia that is stable, 

stable pancreatic ductal dilatation to about 7 mm.  There is no suggestion of 

pancreatitis on CT.





The patient's repeat blood sugar was 322.  The patient was given in total 2 L 

of normal saline IV fluids.  The patient is encouraged to take his medications 

as previously prescribed.





The patient is resting comfortably and feels better, is alert and in no 

distress. The patient's results and examination findings were discussed with 

the patient. The repeat examination is unremarkable and benign. The history, 

exam, diagnostic testing, and current condition do not suggest any significant 

pathology to warrant further testing, continued ED treatment, admission, or 

surgical evaluation at this point. The vital signs have been stable. The 

patient does not have uncontrollable pain, intractable vomiting, or other 

significant symptoms. The patient's condition is stable and appropriate for 

discharge. The patient will pursue further outpatient evaluation with a primary 

care physician or other designated or consulting physician as indicated in the 

discharge instructions. The patient expressed understanding and was agreeable 

with this plan.


Diagnosis





 Primary Impression:  


 Hyperglycemia due to type 2 diabetes mellitus


 Qualified Codes:  E11.65 - Type 2 diabetes mellitus with hyperglycemia; Z79.4 

- Long term (current) use of insulin


 Additional Impression:  


 Noncompliance with medication regimen


Referrals:  


Primary Care Physician


2 days


Patient Instructions:  Diabetic Hyperglycemia (ED), General Instructions


***Med/Other Pt SpecificInfo:  No Change to Meds


Disposition:  01 DISCHARGE HOME


Condition:  Stable











Teresa Medina MD Apr 23, 2018 01:39

## 2018-04-23 NOTE — RADRPT
EXAM DATE/TIME:  04/23/2018 00:20 

 

[facility] COMPARISON:     

CT ABDOMEN & PELVIS W CONTRAST, February 07, 2017, 12:47.

 

 

INDICATIONS :     

Abdomen pain past 2 days.

                  

 

ORAL CONTRAST:      

No oral contrast ingested.

                  

 

RADIATION DOSE:     

6.93 CTDIvol (mGy) 

 

 

MEDICAL HISTORY :     

Cardiovascular disease. Hypertension. Diabetes mellitus type 2.Pancreatitis COPD Renal failure

 

SURGICAL HISTORY :      

Cholecystectomy. Pacemaker.Discectomy, lumbar.

 

ENCOUNTER:      

Initial

 

ACUITY:      

2 days

 

PAIN SCALE:      

6/10

 

LOCATION:       

Bilateral  abdomen

 

TECHNIQUE:     

Volumetric scanning of the abdomen and pelvis was performed.  Using automated exposure control and ad
justment of the mA and/or kV according to patient size, radiation dose was kept as low as reasonably 
achievable to obtain optimal diagnostic quality images.  DICOM format image data is available electro
nically for review and comparison.  

 

FINDINGS:     

Compare February 7, 2017. There is basilar pulmonary fibrosis with some traction bronchiectasis. Find
ings have progressed slightly since February 2017. Pacer lead tip in right ventricle.

 

Upper abdomen again reveals cholecystectomy. Stable pneumobilia. No acute findings in the spleen, carlota
ma adrenals or pancreas. There is dilatation of the pancreatic duct to about 7 mm similar to prior ex
amination.

 

His previous bowel anastomosis. No obstruction. Extensive colonic diverticulosis without evidence for
 diverticulitis.

 

CONCLUSION:     

1. Slight progression in basilar pulmonary fibrosis since February 2017. Traction bronchiectasis pres
ent.

2. Previous bowel anastomosis without obstruction, free fluid or free air.

Extensive colonic diverticulosis. 

Previous cholecystectomy with pneumobilia, stable.

Stable pancreatic ductal dilatation to about 7 mm. 

 

 

 Branden Salgado MD on April 23, 2018 at 0:27           

Board Certified Radiologist.

 This report was verified electronically.

## 2018-04-24 NOTE — EKG
Date Performed: 04/22/2018       Time Performed: 22:04:15

 

PTAGE:      67 years

 

EKG:      Sinus rhythm 

 

 MARKED LEFT AXIS DEVIATION NONSPECIFIC T-WAVE ABNORMALITY ABNORMAL ECG Since the 

 

 PREVIOUS TRACING            , no significant change noted

 

DOCTOR:   Prabhu Watkins  Interpretating Date/Time  04/24/2018 00:16:33

## 2018-05-22 ENCOUNTER — HOSPITAL ENCOUNTER (INPATIENT)
Dept: HOSPITAL 17 - NEPE | Age: 68
LOS: 13 days | Discharge: HOME HEALTH | DRG: 853 | End: 2018-06-04
Attending: HOSPITALIST | Admitting: HOSPITALIST
Payer: COMMERCIAL

## 2018-05-22 VITALS
SYSTOLIC BLOOD PRESSURE: 139 MMHG | HEART RATE: 99 BPM | DIASTOLIC BLOOD PRESSURE: 92 MMHG | OXYGEN SATURATION: 93 % | TEMPERATURE: 98.6 F | RESPIRATION RATE: 24 BRPM

## 2018-05-22 VITALS — HEART RATE: 79 BPM

## 2018-05-22 VITALS
OXYGEN SATURATION: 97 % | HEART RATE: 114 BPM | SYSTOLIC BLOOD PRESSURE: 144 MMHG | RESPIRATION RATE: 28 BRPM | DIASTOLIC BLOOD PRESSURE: 72 MMHG

## 2018-05-22 VITALS
DIASTOLIC BLOOD PRESSURE: 80 MMHG | HEART RATE: 96 BPM | SYSTOLIC BLOOD PRESSURE: 141 MMHG | RESPIRATION RATE: 16 BRPM | OXYGEN SATURATION: 96 %

## 2018-05-22 VITALS
OXYGEN SATURATION: 92 % | DIASTOLIC BLOOD PRESSURE: 77 MMHG | RESPIRATION RATE: 26 BRPM | HEART RATE: 112 BPM | SYSTOLIC BLOOD PRESSURE: 120 MMHG | TEMPERATURE: 98.7 F

## 2018-05-22 VITALS — HEART RATE: 105 BPM

## 2018-05-22 VITALS — OXYGEN SATURATION: 100 %

## 2018-05-22 VITALS
RESPIRATION RATE: 20 BRPM | HEART RATE: 94 BPM | DIASTOLIC BLOOD PRESSURE: 78 MMHG | OXYGEN SATURATION: 94 % | SYSTOLIC BLOOD PRESSURE: 132 MMHG

## 2018-05-22 VITALS — HEART RATE: 91 BPM

## 2018-05-22 VITALS
SYSTOLIC BLOOD PRESSURE: 105 MMHG | OXYGEN SATURATION: 94 % | DIASTOLIC BLOOD PRESSURE: 66 MMHG | HEART RATE: 97 BPM | RESPIRATION RATE: 20 BRPM

## 2018-05-22 VITALS — OXYGEN SATURATION: 98 %

## 2018-05-22 VITALS — BODY MASS INDEX: 23.18 KG/M2 | WEIGHT: 156.53 LBS | HEIGHT: 69 IN

## 2018-05-22 VITALS — HEART RATE: 94 BPM

## 2018-05-22 VITALS — HEART RATE: 102 BPM

## 2018-05-22 VITALS — HEART RATE: 85 BPM

## 2018-05-22 VITALS — HEART RATE: 96 BPM

## 2018-05-22 VITALS — HEART RATE: 95 BPM

## 2018-05-22 DIAGNOSIS — E87.1: ICD-10-CM

## 2018-05-22 DIAGNOSIS — F32.9: ICD-10-CM

## 2018-05-22 DIAGNOSIS — R06.6: ICD-10-CM

## 2018-05-22 DIAGNOSIS — Z79.4: ICD-10-CM

## 2018-05-22 DIAGNOSIS — J44.0: ICD-10-CM

## 2018-05-22 DIAGNOSIS — T38.0X5A: ICD-10-CM

## 2018-05-22 DIAGNOSIS — F19.10: ICD-10-CM

## 2018-05-22 DIAGNOSIS — J15.212: ICD-10-CM

## 2018-05-22 DIAGNOSIS — K21.9: ICD-10-CM

## 2018-05-22 DIAGNOSIS — R65.20: ICD-10-CM
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
## 2018-05-22 DIAGNOSIS — E78.5: ICD-10-CM

## 2018-05-22 DIAGNOSIS — N17.9: ICD-10-CM

## 2018-05-22 DIAGNOSIS — E11.10: ICD-10-CM

## 2018-05-22 DIAGNOSIS — K86.1: ICD-10-CM

## 2018-05-22 DIAGNOSIS — I35.8: ICD-10-CM

## 2018-05-22 DIAGNOSIS — I12.9: ICD-10-CM

## 2018-05-22 DIAGNOSIS — F14.10: ICD-10-CM

## 2018-05-22 DIAGNOSIS — N18.3: ICD-10-CM

## 2018-05-22 DIAGNOSIS — A40.1: Primary | ICD-10-CM

## 2018-05-22 DIAGNOSIS — M54.9: ICD-10-CM

## 2018-05-22 DIAGNOSIS — E87.5: ICD-10-CM

## 2018-05-22 DIAGNOSIS — Z79.899: ICD-10-CM

## 2018-05-22 DIAGNOSIS — Z87.891: ICD-10-CM

## 2018-05-22 DIAGNOSIS — J96.01: ICD-10-CM

## 2018-05-22 DIAGNOSIS — T82.7XXA: ICD-10-CM

## 2018-05-22 DIAGNOSIS — J44.1: ICD-10-CM

## 2018-05-22 DIAGNOSIS — E11.22: ICD-10-CM

## 2018-05-22 DIAGNOSIS — D72.829: ICD-10-CM

## 2018-05-22 DIAGNOSIS — G89.29: ICD-10-CM

## 2018-05-22 LAB
ALBUMIN SERPL-MCNC: 3.8 GM/DL (ref 3.4–5)
ALP SERPL-CCNC: 115 U/L (ref 45–117)
ALT SERPL-CCNC: 18 U/L (ref 12–78)
AST SERPL-CCNC: 11 U/L (ref 15–37)
BASOPHILS # BLD AUTO: 0.1 TH/MM3 (ref 0–0.2)
BASOPHILS NFR BLD: 0.4 % (ref 0–2)
BILIRUB SERPL-MCNC: 1.9 MG/DL (ref 0.2–1)
BUN SERPL-MCNC: 21 MG/DL (ref 7–18)
CALCIUM SERPL-MCNC: 9.7 MG/DL (ref 8.5–10.1)
CHLORIDE SERPL-SCNC: 91 MEQ/L (ref 98–107)
CREAT SERPL-MCNC: 1.99 MG/DL (ref 0.6–1.3)
D-DIMER: 2.28 MG/L FEU (ref 0–0.5)
EOSINOPHIL # BLD: 0 TH/MM3 (ref 0–0.4)
EOSINOPHIL NFR BLD: 0 % (ref 0–4)
ERYTHROCYTE [DISTWIDTH] IN BLOOD BY AUTOMATED COUNT: 13.2 % (ref 11.6–17.2)
GFR SERPLBLD BASED ON 1.73 SQ M-ARVRAT: 41 ML/MIN (ref 89–?)
GLUCOSE SERPL-MCNC: 495 MG/DL (ref 74–106)
HCO3 BLD-SCNC: 24.2 MEQ/L (ref 21–32)
HCT VFR BLD CALC: 45.3 % (ref 39–51)
HGB BLD-MCNC: 15.2 GM/DL (ref 13–17)
INR PPP: 1 RATIO
LACTIC ACID SEPSIS PROTOCOL: 4.1 MMOL/L (ref 0.4–2)
LYMPHOCYTES # BLD AUTO: 0.6 TH/MM3 (ref 1–4.8)
LYMPHOCYTES NFR BLD AUTO: 3.9 % (ref 9–44)
LYMPHOCYTES: 2 % (ref 9–44)
MAGNESIUM SERPL-MCNC: 1.5 MG/DL (ref 1.5–2.5)
MCH RBC QN AUTO: 30.1 PG (ref 27–34)
MCHC RBC AUTO-ENTMCNC: 33.6 % (ref 32–36)
MCV RBC AUTO: 89.8 FL (ref 80–100)
MONOCYTE #: 1.2 TH/MM3 (ref 0–0.9)
MONOCYTES NFR BLD: 7.9 % (ref 0–8)
MONOCYTES: 8 % (ref 0–8)
NEUTROPHILS # BLD AUTO: 13.5 TH/MM3 (ref 1.8–7.7)
NEUTROPHILS NFR BLD AUTO: 87.8 % (ref 16–70)
NEUTS BAND # BLD MANUAL: 13.9 TH/MM3 (ref 1.8–7.7)
NEUTS BAND NFR BLD: 15 % (ref 0–6)
NEUTS SEG NFR BLD MANUAL: 75 % (ref 16–70)
PLATELET # BLD: 173 TH/MM3 (ref 150–450)
PMV BLD AUTO: 9.4 FL (ref 7–11)
PROT SERPL-MCNC: 8.1 GM/DL (ref 6.4–8.2)
PROTHROMBIN TIME: 10.6 SEC (ref 9.8–11.6)
RBC # BLD AUTO: 5.05 MIL/MM3 (ref 4.5–5.9)
SODIUM SERPL-SCNC: 130 MEQ/L (ref 136–145)
TROPONIN I SERPL-MCNC: (no result) NG/ML (ref 0.02–0.05)
WBC # BLD AUTO: 15.4 TH/MM3 (ref 4–11)
WBC TOXIC VACUOLES BLD QL SMEAR: PRESENT

## 2018-05-22 PROCEDURE — 86920 COMPATIBILITY TEST SPIN: CPT

## 2018-05-22 PROCEDURE — 83605 ASSAY OF LACTIC ACID: CPT

## 2018-05-22 PROCEDURE — 96367 TX/PROPH/DG ADDL SEQ IV INF: CPT

## 2018-05-22 PROCEDURE — 85025 COMPLETE CBC W/AUTO DIFF WBC: CPT

## 2018-05-22 PROCEDURE — 83036 HEMOGLOBIN GLYCOSYLATED A1C: CPT

## 2018-05-22 PROCEDURE — A9540 TC99M MAA: HCPCS

## 2018-05-22 PROCEDURE — 80053 COMPREHEN METABOLIC PANEL: CPT

## 2018-05-22 PROCEDURE — 94150 VITAL CAPACITY TEST: CPT

## 2018-05-22 PROCEDURE — 85027 COMPLETE CBC AUTOMATED: CPT

## 2018-05-22 PROCEDURE — 87102 FUNGUS ISOLATION CULTURE: CPT

## 2018-05-22 PROCEDURE — 85379 FIBRIN DEGRADATION QUANT: CPT

## 2018-05-22 PROCEDURE — 87147 CULTURE TYPE IMMUNOLOGIC: CPT

## 2018-05-22 PROCEDURE — 87206 SMEAR FLUORESCENT/ACID STAI: CPT

## 2018-05-22 PROCEDURE — 85007 BL SMEAR W/DIFF WBC COUNT: CPT

## 2018-05-22 PROCEDURE — 83880 ASSAY OF NATRIURETIC PEPTIDE: CPT

## 2018-05-22 PROCEDURE — 83690 ASSAY OF LIPASE: CPT

## 2018-05-22 PROCEDURE — 93308 TTE F-UP OR LMTD: CPT

## 2018-05-22 PROCEDURE — 87205 SMEAR GRAM STAIN: CPT

## 2018-05-22 PROCEDURE — 87184 SC STD DISK METHOD PER PLATE: CPT

## 2018-05-22 PROCEDURE — 82550 ASSAY OF CK (CPK): CPT

## 2018-05-22 PROCEDURE — 87804 INFLUENZA ASSAY W/OPTIC: CPT

## 2018-05-22 PROCEDURE — 84443 ASSAY THYROID STIM HORMONE: CPT

## 2018-05-22 PROCEDURE — 87116 MYCOBACTERIA CULTURE: CPT

## 2018-05-22 PROCEDURE — 86403 PARTICLE AGGLUT ANTBDY SCRN: CPT

## 2018-05-22 PROCEDURE — 84439 ASSAY OF FREE THYROXINE: CPT

## 2018-05-22 PROCEDURE — 71250 CT THORAX DX C-: CPT

## 2018-05-22 PROCEDURE — 96361 HYDRATE IV INFUSION ADD-ON: CPT

## 2018-05-22 PROCEDURE — 93325 DOPPLER ECHO COLOR FLOW MAPG: CPT

## 2018-05-22 PROCEDURE — 94640 AIRWAY INHALATION TREATMENT: CPT

## 2018-05-22 PROCEDURE — 93318 ECHO TRANSESOPHAGEAL INTRAOP: CPT

## 2018-05-22 PROCEDURE — 86901 BLOOD TYPING SEROLOGIC RH(D): CPT

## 2018-05-22 PROCEDURE — 36600 WITHDRAWAL OF ARTERIAL BLOOD: CPT

## 2018-05-22 PROCEDURE — 80307 DRUG TEST PRSMV CHEM ANLYZR: CPT

## 2018-05-22 PROCEDURE — 93320 DOPPLER ECHO COMPLETE: CPT

## 2018-05-22 PROCEDURE — 82948 REAGENT STRIP/BLOOD GLUCOSE: CPT

## 2018-05-22 PROCEDURE — 87015 SPECIMEN INFECT AGNT CONCNTJ: CPT

## 2018-05-22 PROCEDURE — 87070 CULTURE OTHR SPECIMN AEROBIC: CPT

## 2018-05-22 PROCEDURE — 94664 DEMO&/EVAL PT USE INHALER: CPT

## 2018-05-22 PROCEDURE — 96365 THER/PROPH/DIAG IV INF INIT: CPT

## 2018-05-22 PROCEDURE — 93005 ELECTROCARDIOGRAM TRACING: CPT

## 2018-05-22 PROCEDURE — 82805 BLOOD GASES W/O2 SATURATION: CPT

## 2018-05-22 PROCEDURE — 84100 ASSAY OF PHOSPHORUS: CPT

## 2018-05-22 PROCEDURE — 81001 URINALYSIS AUTO W/SCOPE: CPT

## 2018-05-22 PROCEDURE — 87186 SC STD MICRODIL/AGAR DIL: CPT

## 2018-05-22 PROCEDURE — 83735 ASSAY OF MAGNESIUM: CPT

## 2018-05-22 PROCEDURE — 93970 EXTREMITY STUDY: CPT

## 2018-05-22 PROCEDURE — 78582 LUNG VENTILAT&PERFUS IMAGING: CPT

## 2018-05-22 PROCEDURE — 86900 BLOOD TYPING SEROLOGIC ABO: CPT

## 2018-05-22 PROCEDURE — 88300 SURGICAL PATH GROSS: CPT

## 2018-05-22 PROCEDURE — 87040 BLOOD CULTURE FOR BACTERIA: CPT

## 2018-05-22 PROCEDURE — 71046 X-RAY EXAM CHEST 2 VIEWS: CPT

## 2018-05-22 PROCEDURE — 96372 THER/PROPH/DIAG INJ SC/IM: CPT

## 2018-05-22 PROCEDURE — 86850 RBC ANTIBODY SCREEN: CPT

## 2018-05-22 PROCEDURE — 85730 THROMBOPLASTIN TIME PARTIAL: CPT

## 2018-05-22 PROCEDURE — 85610 PROTHROMBIN TIME: CPT

## 2018-05-22 PROCEDURE — 93312 ECHO TRANSESOPHAGEAL: CPT

## 2018-05-22 PROCEDURE — 84484 ASSAY OF TROPONIN QUANT: CPT

## 2018-05-22 PROCEDURE — 76937 US GUIDE VASCULAR ACCESS: CPT

## 2018-05-22 PROCEDURE — 80202 ASSAY OF VANCOMYCIN: CPT

## 2018-05-22 PROCEDURE — 71045 X-RAY EXAM CHEST 1 VIEW: CPT

## 2018-05-22 PROCEDURE — A9567 TECHNETIUM TC-99M AEROSOL: HCPCS

## 2018-05-22 PROCEDURE — 80048 BASIC METABOLIC PNL TOTAL CA: CPT

## 2018-05-22 RX ADMIN — METHYLPREDNISOLONE SODIUM SUCCINATE SCH MG: 40 INJECTION, POWDER, FOR SOLUTION INTRAMUSCULAR; INTRAVENOUS at 16:19

## 2018-05-22 RX ADMIN — IPRATROPIUM BROMIDE AND ALBUTEROL SULFATE SCH AMPULE: .5; 3 SOLUTION RESPIRATORY (INHALATION) at 19:39

## 2018-05-22 RX ADMIN — INSULIN ASPART SCH: 100 INJECTION, SOLUTION INTRAVENOUS; SUBCUTANEOUS at 17:00

## 2018-05-22 RX ADMIN — METHYLPREDNISOLONE SODIUM SUCCINATE SCH MG: 40 INJECTION, POWDER, FOR SOLUTION INTRAMUSCULAR; INTRAVENOUS at 20:33

## 2018-05-22 RX ADMIN — ONDANSETRON PRN MG: 4 TABLET, ORALLY DISINTEGRATING ORAL at 16:58

## 2018-05-22 RX ADMIN — Medication PRN ML: at 20:33

## 2018-05-22 RX ADMIN — GUAIFENESIN PRN MG: 100 SOLUTION ORAL at 20:31

## 2018-05-22 RX ADMIN — ALBUTEROL SULFATE PRN MG: 1.25 SOLUTION RESPIRATORY (INHALATION) at 13:58

## 2018-05-22 RX ADMIN — IPRATROPIUM BROMIDE AND ALBUTEROL SULFATE SCH AMPULE: .5; 3 SOLUTION RESPIRATORY (INHALATION) at 16:18

## 2018-05-22 RX ADMIN — NITROGLYCERIN SCH MG: 0.4 TABLET SUBLINGUAL at 08:29

## 2018-05-22 RX ADMIN — PANCRELIPASE SCH CAP: 60000; 12000; 38000 CAPSULE, DELAYED RELEASE PELLETS ORAL at 16:19

## 2018-05-22 RX ADMIN — PANCRELIPASE SCH CAP: 60000; 12000; 38000 CAPSULE, DELAYED RELEASE PELLETS ORAL at 18:01

## 2018-05-22 RX ADMIN — INSULIN ASPART SCH: 100 INJECTION, SOLUTION INTRAVENOUS; SUBCUTANEOUS at 20:39

## 2018-05-22 RX ADMIN — HEPARIN SODIUM SCH UNITS: 10000 INJECTION, SOLUTION INTRAVENOUS; SUBCUTANEOUS at 20:33

## 2018-05-22 RX ADMIN — NITROGLYCERIN SCH MG: 0.4 TABLET SUBLINGUAL at 08:10

## 2018-05-22 NOTE — RADRPT
EXAM DATE:  5/22/2018 8:57 AM EDT

AGE/SEX:        67 years / Male



INDICATIONS:  Cough, congestion, and chest pain.



CLINICAL DATA:  This is the patient's initial encounter. Patient reports that signs and symptoms have
 been present for 2 days and indicates a pain score of 4/10. 

                                                                          

MEDICAL/SURGICAL HISTORY:       None. Pacemaker.



COMPARISON:      [facility], CHEST PA & LAT, 5/26/2016.  .



FINDINGS:  Redemonstration of diffuse chronic interstitial prominence. Persistent fibrosis primarily 
in the left lower lung zone. No new focal pleural or parenchymal opacities are noted. Cardiomediastin
al contours are stable with stable dual lead pacemaker in place. Bony thorax is intact.

.

CONCLUSION: No acute abnormality or significant interval change



Electronically signed by: Owen Stewart MD  5/22/2018 9:12 AM EDT

## 2018-05-22 NOTE — RADRPT
EXAM DATE:  5/22/2018 12:38 PM EDT

AGE/SEX:        67 years / Male



INDICATIONS:  Dyspnea.



CLINICAL DATA:  This is the patient's initial encounter. Patient reports that signs and symptoms have
 been present for 1 day and indicates a pain score of 0/10. 

                                                                          

MEDICAL/SURGICAL HISTORY:       Hypertension.  Chronic obstructive pulmonary disease.  Gastroesophage
al reflux disease.  Ex-smoker.  Pacemaker.  Hysterectomy. Cardiac catherization.



COMPARISON:      No prior [facility] exams available for comparison.



DOSE:  1.2  mCi  Tc99m DTPA aerosol

              8.4  mCi  Tc99m MAA IV



TECHNIQUE:  Following five minutes of tidal breathing of DTPA aerosol, planar images of the lungs wer
e performed in eight projections.  The patient was then injected with MAA, and eight-view perfusion s
can was performed.  



FINDINGS:  There is a patchy uptake of tracer activity throughout both lung fields on the ventilation
 portion of the examination. This suggests airspace disease.

The perfusion lung scan demonstrates a homogenous pattern of uptake in both lungs.  No segmental or s
ubsegmental defects are seen. 



CONCLUSION: 

1.  Low probability for PE.

2.  Patchy uptake of tracer activity on the ventilation study suggestive of airspace disease.



Electronically signed by: Refugio Perla MD  5/22/2018 12:44 PM EDT

## 2018-05-22 NOTE — EKG
Date Performed: 05/22/2018       Time Performed: 07:50:50

 

PTAGE:      67 years

 

EKG:      SINUS TACHYCARDIA POSSIBLE LEFT ATRIAL ENLARGEMENT MARKED LEFT AXIS DEVIATION NONSPECIFIC T
-WAVE ABNORMALITY ABNORMAL ECG

 

PREVIOUS TRACING       : 04/22/2018 22.04 Since the previous tracing, no significant change noted

 

DOCTOR:   Reta De La Garza  Interpretating Date/Time  05/22/2018 16:59:51

## 2018-05-22 NOTE — PD
HPI


Chief Complaint:  Respiratory Symptoms


Time Seen by Provider:  07:45


Travel History


International Travel<30 days:  No


Contact w/Intl Traveler<30days:  No


Traveled to known affect area:  No





History of Present Illness


HPI


Patient is a 67-year-old male with history of hypertension, hyperlipidemia, 

diabetes, asthma, COPD, pacemaker, pancreatitis, presents the emergency room 

for evaluation of chest pain and shortness of breath.  Patient reports that he 

has chronic pain, reports that yesterday around noontime, he was at his pain 

management doctor began to have right sided chest pain with shortness of 

breath.  Patient reports that chest pain feels like a sharp and stabbing 

sensation along with a pressure to his chest, reports that chest pain is 

located to the right side of his chest..  Patient reports that nothing makes 

pain better or worse.  Patient denies any diaphoresis, denies any nausea or 

vomiting with this.  Patient denies any cough or congestion.  Patient reports 

that he does see a cardiologist which is Dr. Marylou Calzada, his pulmonary doctor 

is Dr. Mcgregor.  Patient denies any history of cardiac stents





PFSH


Past Medical History


Arthritis:  No


Asthma:  Yes


Autoimmune Disease:  No


Blood Disorders:  No


Anxiety:  Yes


Depression:  Yes


Heart Rhythm Problems:  Yes (pace maker placed to treat bradycardia)


Cancer:  No


Cardiac Catheterization:  Yes


Cardiovascular Problems:  Yes (PACER)


High Cholesterol:  No


Chemotherapy:  No


Chest Pain:  Yes (janessaen states that he some times has chest pain)


Congestive Heart Failure:  No


COPD:  Yes


Cerebrovascular Accident:  No


Diabetes:  Yes


Diminished Hearing:  No


Endocrine:  Yes


Gastrointestinal Disorders:  Yes (PANCREATITIS)


GERD:  Yes


Glaucoma:  No


Genitourinary:  No


Headaches:  No


Hepatitis:  No


Hiatal Hernia:  No


Heparin Induced Thrombocytopen:  No


Hypertension:  Yes


Immune Disorder:  No


Implanted Vascular Access Dvce:  Yes


Kidney Stones:  Yes


Musculoskeletal:  Yes (OA, L4-L5 HERNIATED DISC REMOVED)


Neurologic:  No


Psychiatric:  No


Reproductive:  No


Respiratory:  Yes


Immunizations Current:  Yes


Migraines:  No


Myocardial Infarction:  No


Pancreatitis:  Yes


Pneumonia:  Yes


Radiation Therapy:  No


Renal Failure:  Yes


Seizures:  No


Sickle Cell Disease:  No


Sleep Apnea:  No


Thyroid Disease:  No


Ulcer:  No


PNEUMOCCOCAL Vaccine (Year):  2





Past Surgical History


Abdominal Surgery:  Yes (Gallbladder removed 1997)


AICD:  No


Arteriovenous Shunt:  No


Body Medical Devices:  PACEMAKER,


Cardiac Surgery:  Yes (Pace maker 1997)


Cholecystectomy:  Yes


Ear Surgery:  No


Endocrine Surgery:  No


Eye Surgery:  No


Genitourinary Surgery:  No


Gynecologic Surgery:  No


Insulin Pump:  No


Joint Replacement:  No


Neurologic Surgery:  Yes (LAMINECTOMY L4/5)


Oral Surgery:  No


Pacemaker:  Yes (Biotronik )


Thoracic Surgery:  No


Other Surgery:  Yes (HAND SURGERY -RIGHT TENDON REPAIR)





Social History


Alcohol Use:  Yes (STATES QUIT)


Tobacco Use:  No (QUIT 2012)


Substance Use:  No





Allergies-Medications


(Allergen,Severity, Reaction):  


Coded Allergies:  


     No Known Drug Allergies (Verified  Allergy, Unknown, 5/22/18)


     MRI PRECAUTION (Verified  Adverse Reaction, Severe, PACEMAKER, 5/22/18)


 PT HAS PACEMAKER PER NURSE PARISH/[facility]


     *MDRO Multi-Drug Resistant Organism (Verified  Adverse Reaction, Unknown, 

Cleared - 5/24/16, 5/22/18)


 MRSA PCR (nares) negative - 5/21/16 & 5/24/16


 ***Cleared per Infection Control***


 


 MRSA (blood and urine) - 2/2013


Reported Meds & Prescriptions





Reported Meds & Active Scripts


Active


Reported


Roxicodone (Oxycodone HCl) 15 Mg Tab 15 Mg PO Q8H PRN


Zoloft (Sertraline HCl) 50 Mg Tab 50 Mg PO DAILY


Bydureon Pen Inj (Exenatide) 2 Mg Pfpen 2 Mg SQ Q7D


Pantoprazole (Pantoprazole Sodium) 20 Mg Tab 20 Mg PO DAILY


Proair Hfa 8.5 GM Inh (Albuterol Sulfate) 90 Mcg/Act Aer 1 Puff INH Q4H PRN


     108 mcg/actuation


Clonidine (Clonidine HCl) 0.2 Mg Tab 0.05 Tab PO DAILY


Ativan (Lorazepam) 0.5 Mg Tab 0.5 Mg PO Q8H PRN


Reglan (Metoclopramide HCl) 10 Mg Tab 10 Mg PO DAILY


Creon (Amylase/Lipase/Protease) 12,000-38,000-60,000 Units Cap 1 Cap PO TIDPC


Aspirin EC (Aspirin) 81 Mg Tabdr 81 Mg PO DAILY


Lisinopril 20 Mg Tab 20 Mg PO BID


Duoneb (Ipratropium-Albuterol Neb) 0.5-2.5 Mg/3 Ml Neb 1 Nebule INH Q4HR NEB


Proair Hfa 8.5 GM Inh (Albuterol Sulfate) 90 Mcg/Act Aer 2 Puff INH Q4-6H PRN


     108 mcg/actuation


Janumet (Sitagliptin-Metformin)  Mg Tab 1 Tab PO BID


Cialis (Tadalafil) 5 Mg Tab 5 Mg PO DAILY


     Do not exceed 1 dose/day.








Review of Systems


General / Constitutional:  No: Fever


Eyes:  No: Visual changes


HENT:  No: Headaches


Cardiovascular:  Positive: Chest Pain or Discomfort, Tachycardia, No: 

Diaphoresis


Respiratory:  Positive: Shortness of Breath, Wheezing


Gastrointestinal:  No: Abdominal Pain


Genitourinary:  No: Dysuria


Musculoskeletal:  No: Pain


Skin:  No Rash


Neurologic:  No: Weakness


Psychiatric:  No: Depression


Endocrine:  No: Polydipsia


Hematologic/Lymphatic:  No: Easy Bruising





Physical Exam


Narrative


GENERAL: Moderate distress


SKIN: Focused skin assessment warm/dry.


HEAD: Atraumatic. Normocephalic. 


EYES: Pupils equal and round. No scleral icterus. No injection or drainage. 


ENT: No nasal bleeding or discharge.  Mucous membranes pink and moist.


NECK: Trachea midline. No JVD. 


CARDIOVASCULAR: Tachycardia.  No murmur appreciated.


RESPIRATORY: No accessory muscle use. Clear to auscultation. Breath sounds 

equal bilaterally. 


GASTROINTESTINAL: Abdomen soft, non-tender, nondistended. Hepatic and splenic 

margins not palpable. 


MUSCULOSKELETAL: No obvious deformities. No clubbing.  No cyanosis.  No edema. 


NEUROLOGICAL: Awake and alert. No obvious cranial nerve deficits.  Motor 

grossly within normal limits. Normal speech.


PSYCHIATRIC: Appropriate mood and affect; insight and judgment normal.





Data


Data


Last Documented VS





Vital Signs








  Date Time  Temp Pulse Resp B/P (MAP) Pulse Ox O2 Delivery O2 Flow Rate FiO2


 


5/22/18 10:22     98 Nasal Cannula 2.00 


 


5/22/18 10:00  97 20 105/66 (79)    


 


5/22/18 07:37 98.7       








Orders





 Orders


Electrocardiogram (5/22/18 07:53)


B-Type Natriuretic Peptide (5/22/18 07:53)


Ckmb (Isoenzyme) Profile (5/22/18 07:53)


Complete Blood Count With Diff (5/22/18 07:53)


Comprehensive Metabolic Panel (5/22/18 07:53)


D-Dimer (5/22/18 07:53)


Magnesium (Mg) (5/22/18 07:53)


Prothrombin Time / Inr (Pt) (5/22/18 07:53)


Act Partial Throm Time (Ptt) (5/22/18 07:53)


Troponin I (5/22/18 07:53)


Lipase (5/22/18 07:53)


Ecg Monitoring (5/22/18 07:53)


Bilateral Bp Monitoring (5/22/18 07:53)


Iv Access Insert/Monitor (5/22/18 07:53)


Oximetry (5/22/18 07:53)


Aspirin Chew (Aspirin Chew) (5/22/18 08:00)


Sodium Chloride 0.9% Flush (Ns Flush) (5/22/18 08:00)


Nitroglycerin Sl (Nitrostat Sl) (5/22/18 08:00)


Chest, Pa & Lat (5/22/18 07:53)


Drug Screen, Random Urine (5/22/18 07:57)


Sodium Chlor 0.9% 1000 Ml Inj (Ns 1000 M (5/22/18 07:57)


Vascular Access Team Consult/P PRN (5/22/18 08:20)


Vascular Poc Ultrasound (5/22/18 )


Sepsis Workup Initiated (5/22/18 )


Lactic Acid Sepsis Protocol (5/22/18 09:48)


Influenzae A/B Antigen (5/22/18 09:48)


Blood Culture (5/22/18 09:48)


Ventilation & Perfusion Scan (5/22/18 )


Us Leg Venous Doppler Bilat (5/22/18 )


Sodium Chlor 0.9% 1000 Ml Inj (Ns 1000 M (5/22/18 10:00)


Ceftriaxone Inj (Rocephin Inj) (5/22/18 10:00)


Azithromycin Inj (Zithromax Inj) (5/22/18 10:00)


Sodium Chlor 0.9% 250 Ml Inj (Ns 250 Ml (5/22/18 10:00)


Insulin Human Regular Inj (Novolin R Inj (5/22/18 10:00)


Arterial Blood Gas (Abg) (5/22/18 )


Blood Glucose (5/22/18 10:06)


Blood Glucose (5/22/18 11:06)


Admit Order (Ed Use Only) (5/22/18 12:01)





Labs





Laboratory Tests








Test


  5/22/18


08:45 5/22/18


10:06 5/22/18


10:07


 


White Blood Count 15.4 TH/MM3   


 


Red Blood Count 5.05 MIL/MM3   


 


Hemoglobin 15.2 GM/DL   


 


Hematocrit 45.3 %   


 


Mean Corpuscular Volume 89.8 FL   


 


Mean Corpuscular Hemoglobin 30.1 PG   


 


Mean Corpuscular Hemoglobin


Concent 33.6 % 


  


  


 


 


Red Cell Distribution Width 13.2 %   


 


Platelet Count 173 TH/MM3   


 


Mean Platelet Volume 9.4 FL   


 


Neutrophils (%) (Auto) 87.8 %   


 


Lymphocytes (%) (Auto) 3.9 %   


 


Monocytes (%) (Auto) 7.9 %   


 


Eosinophils (%) (Auto) 0.0 %   


 


Basophils (%) (Auto) 0.4 %   


 


Neutrophils # (Auto) 13.5 TH/MM3   


 


Lymphocytes # (Auto) 0.6 TH/MM3   


 


Monocytes # (Auto) 1.2 TH/MM3   


 


Eosinophils # (Auto) 0.0 TH/MM3   


 


Basophils # (Auto) 0.1 TH/MM3   


 


CBC Comment AUTO DIFF   


 


Differential Total Cells


Counted 100 


  


  


 


 


Neutrophils % (Manual) 75 %   


 


Band Neutrophils % 15 %   


 


Lymphocytes % 2 %   


 


Monocytes % 8 %   


 


Neutrophils # (Manual) 13.9 TH/MM3   


 


Differential Comment


  FINAL DIFF


MANUAL 


  


 


 


Toxic Vacuolation PRESENT   


 


Platelet Estimate NORMAL   


 


Platelet Morphology Comment NORMAL   


 


Prothrombin Time 10.6 SEC   


 


Prothromb Time International


Ratio 1.0 RATIO 


  


  


 


 


Activated Partial


Thromboplast Time 27.5 SEC 


  


  


 


 


D-Dimer Quantitative (PE/DVT) 2.28 MG/L FEU   


 


Blood Urea Nitrogen 21 MG/DL   


 


Creatinine 1.99 MG/DL   


 


Random Glucose 495 MG/DL   


 


Total Protein 8.1 GM/DL   


 


Albumin 3.8 GM/DL   


 


Calcium Level 9.7 MG/DL   


 


Magnesium Level 1.5 MG/DL   


 


Alkaline Phosphatase 115 U/L   


 


Aspartate Amino Transf


(AST/SGOT) 11 U/L 


  


  


 


 


Alanine Aminotransferase


(ALT/SGPT) 18 U/L 


  


  


 


 


Total Bilirubin 1.9 MG/DL   


 


Sodium Level 130 MEQ/L   


 


Potassium Level 4.4 MEQ/L   


 


Chloride Level 91 MEQ/L   


 


Carbon Dioxide Level 24.2 MEQ/L   


 


Anion Gap 15 MEQ/L   


 


Estimat Glomerular Filtration


Rate 41 ML/MIN 


  


  


 


 


Total Creatine Kinase 74 U/L   


 


Troponin I


  LESS THAN 0.02


NG/ML 


  


 


 


B-Type Natriuretic Peptide 7 PG/ML   


 


Lipase 330 U/L   


 


Lactic Acid Level  4.1 mmol/L  


 


Blood Gas Puncture Site   RT RADIAL 


 


Blood Gas Patient Temperature   98.6 


 


Blood Gas HCO3   22 mmol/L 


 


Blood Gas Base Excess   -2.0 mmol/L 


 


Blood Gas Oxygen Saturation   87 % 


 


Arterial Blood pH   7.41 


 


Arterial Blood Partial


Pressure CO2 


  


  35 mmHg 


 


 


Arterial Blood Partial


Pressure O2 


  


  59 mmHG 


 


 


Arterial Blood Oxygen Content   16.9 Vol % 


 


Arterial Blood


Carboxyhemoglobin 


  


  1.8 % 


 


 


Arterial Blood Methemoglobin   0.7 % 


 


Blood Gas Hemoglobin   13.9 G/DL 


 


Oxygen Delivery Device   ROOM AIR 


 


Blood Gas Inspired Oxygen   21 % 











[facility]


Medical Decision Making


Medical Screen Exam Complete:  Yes


Emergency Medical Condition:  Yes


Medical Record Reviewed:  Yes


Interpretation(s)


EKG at 0750: Sinus tach at 111bpm, qt/qtc: 314/379, no acute st or t wave 

changes





Vital Signs








  Date Time  Temp Pulse Resp B/P (MAP) Pulse Ox O2 Delivery O2 Flow Rate FiO2


 


5/22/18 07:37 98.7 112 26 120/77 (91) 92   








Differential Diagnosis


ACS, arrhythmia, pneumonia, PE, COPD, pneumothorax


Narrative Course


Patient is a 67-year-old male who presents the emergency room with complaints 

of chest pain or shortness of breath which has been ongoing and persistent 

since 12 PM yesterday afternoon.





During the course of the patients emergency department visit, the patients 

history, examination, and differential diagnosis were reviewed with the 

patient. The patient was placed on a cardiac monitor with oximetry and frequent 

blood pressure monitoring. The patient had an IV access obtained and blood work 

sent for analysis. 





The patient was initially provided 162 mg of aspirin as well as sublingual 

nitroglycerin.





The patients laboratory studies were reviewed and remarkable for 





CBC & BMP Diagram


5/22/18 08:45








Total Protein 8.1, Albumin 3.8, Calcium Level 9.7, Magnesium Level 1.5, 

Alkaline Phosphatase 115, Aspartate Amino Transf (AST/SGOT) 11 L, Alanine 

Aminotransferase (ALT/SGPT) 18, Total Bilirubin 1.9 H











Radiology studies were reviewed and remarkable for 








Last Impressions








Chest X-Ray 5/22/18 0753 Signed





Impressions: 





 CONCLUSION: No acute abnormality or significant interval change





Patient with a white blood cell count of 15.4, patient with a bandemia with 

band neutrophils of 15%, patient is tachycardic with increased respiratory rate 

with pulse ox 92% on room air -blood cultures ordered including lactic acid.  

Given patient's bandemia and patient's cardiopulmonary complaints, will start 

patient on azithromycin as well as Rocephin for presumed.  Patient was given is 

30 cc/kg bolus of fluids.  Patient's blood sugar is 495, will give 8 units of 

subq insulin.  





Patient's d-dimer is elevated at 2.28 -patient's BUN/creatinine is elevated -I 

cannot perform a CTA to rule out PE, a VQ scan as well as Doppler ultrasound 

was ordered





Patient will require admission to the hospital at this time.





Lactate 4.1 





patient septic, septic workup was initiated - IVF as well as antibiotics have 

been administered








Last Impressions








Chest X-Ray 5/22/18 0753 Signed





Impressions: 





 CONCLUSION: No acute abnormality or significant interval change


 


Lower Extremity Ultrasound 5/22/18 0000 Signed





Impressions: 





 CONCLUSION: 





VQ scan pending





Case reviewed with Dr. Ramos who accepts pt to service


Critical Care Narrative


Aggregate critical care time was 30 minutes. Time to perform other separately 

billable procedures was not  


included in the critical care time. My time did not include minutes spent 

treating any other patients simultaneously or on  


activities that did not directly contribute to the patient's treatment.  





The services I provided to this patient were to treat and/or prevent clinically 

significant deterioration that could result  


in:  death, decompensation, deterioration





I provided critical care services requiring my management, as noted below:


Chart data review, documentation time, medication orders and management, vital 

sign assessments/reviewing monitor data,  


ordering and reviewing lab tests, ordering and interpreting/reviewing x-rays 

and diagnostic studies, care of the patient  


and discussion of the patient with the admitting physicians.





Sepsis Criteria


SIRS Criteria (2 or more):  Heart rate over 90, RR  > 20 or PaCO2 < 32, WBC > 

08967, < 4000 or > 10% bands


Criteria Outcome:  Meets SIRS criteria





Diagnosis





 Primary Impression:  


 Sepsis


 Qualified Codes:  A41.9 - Sepsis, unspecified organism


 Additional Impressions:  


 Hyperglycemia


 Hypoxia





Admitting Information


Admitting Physician Requests:  Ninfa Gann DO May 22, 2018 07:58

## 2018-05-22 NOTE — RADRPT
EXAM DATE:  5/22/2018 11:03 AM EDT

AGE/SEX:        67 years / Male



INDICATIONS:  Bilateral leg pain.



CLINICAL DATA:  This is the patient's initial encounter. Patient reports that signs and symptoms have
 been present for 1 day and indicates a pain score of 8/10. 

                                                                          

MEDICAL/SURGICAL HISTORY:       Hypertension.  Chronic obstructive pulmonary disease.  Gastroesophage
al reflux disease.  Bradycardia. Pneumonia. Pancreatitis. Renal failure. Kidney stones. Diabetes.Meth
icillin-resistant Staph Aureus.  Cholecystectomy. Laminectomy L4/5. Pacemaker implanted. Cardiac cath
eterization. Circumcision. right hand tendon repair.



COMPARISON:      [facility] – [place] LEG BILATERAL VENOUS DOPPLER, 4/4/2013.  .



TECHNIQUE:  Venous ultrasound of both lower extremities was performed from the inguinal ligament to t
he proximal calf.  Real-time, color Doppler and spectral tracing, compression and augmentation techni
ques were used.  



FINDINGS:  

Right Leg:  There is normal compressibility of the deep venous system from the inguinal region to the
 proximal calf.  No echogenic clot is seen in the lumen of the common femoral, femoral, popliteal, an
d posterior tibial veins.  There is a normal response of the venous system to proximal and distal aug
mentation and respiration.  



Left Leg:  There is normal compressibility of the deep venous system from the inguinal region to the 
proximal calf.  No echogenic clot is seen in the lumen of the common femoral, femoral, popliteal, and
 posterior tibial veins.  There is a normal response of the venous system to proximal and distal augm
entation and respiration.  



CONCLUSION: 

1.  No evidence of DVT.



Electronically signed by: Refugio Perla MD  5/22/2018 11:08 AM EDT

## 2018-05-22 NOTE — HHI.HP
__________________________________________________





[facility]


Service


[facility]


Primary Care Physician


Kadeem Larios DO


Admission Diagnosis





Sepsis, hypoxia, chest pain


Diagnoses:  


(1) Sepsis


Diagnosis:  Principal





Chief Complaint:  


shortness of breath


Travel History


International Travel<30 Days:  No


Contact w/Intl Traveler <30 Da:  No


Traveled to Known Affected Are:  No





Sepsis Criteria


SIRS Criteria (2 or more):  Heart rate over 90, WBC > 41373, < 4000 or > 10% 

bands


Sepsis Criteria (SIRS+source):  Infect source susp/known


Severe Sepsis (+one):  Lactate >2


Criteria Outcome:  Meets sepsis criteria


History of Present Illness


patient is a 66 y/o male with history of COPD, diabetes mellitus,hypertension, 

chronic pancreatitis, who presented to ER with sob. he says that his sob 

started two days ago. this was associated with dry cough. he's not sure if he 

had any fever at home. he says that he used his inhalers at home with no 

significant improvement. he has some upper abdominal pain which he says is 

worse with the cough. he says that his blood sugar is in 300 range at home.





Review of Systems


Constitutional:  DENIES: Fever, Weight loss, Chills, Night Sweats


Eyes:  DENIES: Blurred vision, Diplopia, Vision loss, Double Vision


Ears, nose, mouth, throat:  DENIES: Tinnitus, Vertigo, Throat pain, Epistaxis


Respiratory:  COMPLAINS OF: Cough, Shortness of breath, DENIES: Apneas, Snoring

, Wheezing, Hemoptysis, Sputum production


Cardiovascular:  DENIES: Chest pain, Palpitations, Syncope, Dyspnea on Exertion

, PND, Lower Extremity Edema, Orthopnea, Claudication


Gastrointestinal:  DENIES: Abdominal pain, Black stools, Bloody stools, 

Constipation, Diarrhea, Nausea, Vomiting, Difficulty Swallowing, Anorexia


Genitourinary:  DENIES: Urinary frequency, Urgency, Hematuria, Dysuria


Musculoskeletal:  DENIES: Joint pain, Muscle aches, Stiffness, Joint Swelling


Integumentary:  DENIES: Rash


Neurologic:  DENIES: Abnormal gait, Headache, Localized weakness, Paresthesias, 

Seizures, Speech Problems, Tremor, Poor Balance


Psychiatric:  DENIES: Anxiety, Confusion, Mood changes, Depression, 

Hallucinations, Agitation, Suicidal Ideation, Homicidal Ideation, Delusions





Past Family Social History


Past Medical History


DM2 


COPD 


CKD3 


HTN 


Chronic Pancreatitis 


Chronic Back Pain 


Hx of Bradycardia


Past Surgical History


Past Surgical History


Lumbar Spine surgery


Cholecystectomy


Right hand laceration repair


Reported Medications


Reported Medications





Reported Meds & Active Scripts


Active


Zofran Odt (Ondansetron Odt) 4 Mg Tab 4 Mg SL Q6HR PRN


Reported


Roxicodone (Oxycodone HCl) 15 Mg Tab 15 Mg PO Q8H PRN


Flonase Nasal Spray (Fluticasone Nasal Spray) 50 Mcg/Act Spray 50 Mcg EACH NARE 

BID


Duoneb (Ipratropium-Albuterol Neb) 0.5-2.5 Mg/3 Ml Neb 1 Nebule INH Q4HR NEB


Pantoprazole (Pantoprazole Sodium) 20 Mg Tab 20 Mg PO DAILY


Proair Hfa 8.5 GM Inh (Albuterol Sulfate) 90 Mcg/Act Aer 2 Puff INH Q4-6H PRN


     108 mcg/actuation


Janumet (Sitagliptin-Metformin)  Mg Tab 1 Tab PO BID


Cialis (Tadalafil) 5 Mg Tab 5 Mg PO DAILY


     Do not exceed 1 dose/day.


Allergies:  


Coded Allergies:  


     No Known Drug Allergies (Verified  Allergy, Unknown, 18)


     MRI PRECAUTION (Verified  Adverse Reaction, Severe, PACEMAKER, 18)


 PT HAS PACEMAKER PER [facility]UR[facility]


     *MDRO Multi-Drug Resistant Organism (Verified  Adverse Reaction, Unknown, 

Cleared - 16, 18)


 MRSA PCR (nares) negative - 16 & 16


 ***Cleared per Infection Control***


 


 MRSA (blood and urine) - 2013


Active Ordered Medications





Inpatient Medications


Aspirin (Aspirin Chew) 162 mg ONCE  ONCE PO  Last administered on 18at 08:

09;  Start 18 at 08:00;  Stop 18 at 08:01;  Status DC


Azithromycin 500 mg/Sodium Chloride 250 ml @  250 mls/hr ONCE  ONCE IV  Last 

administered on 18at 10:30;  Start 18 at 10:00;  Stop 18 at 10:59

;  Status DC


Ceftriaxone Sodium 1000 mg/ Sodium Chloride 100 ml @  200 mls/hr ONCE  ONCE IV  

Last administered on 18at 09:56;  Start 18 at 10:00;  Stop 18 at 

10:29;  Status DC


Insulin Human Regular (NovoLIN R INJ) 8 units ONCE  ONCE SQ  Last administered 

on 18at 10:04;  Start 18 at 10:00;  Stop 18 at 10:03;  Status DC


Nitroglycerin (Nitrostat Sl) 0.4 mg Q5M SL  Last administered on 18at 08:29

;  Start 18 at 08:00;  Stop 18 at 08:11;  Status DC


Oxycodone HCl (Roxicodone) 15 mg Q8H  PRN PO PAIN 4-10;  Start 18 at 12:15


Sodium Chloride 250 ml @  250 mls/hr BOLUS  ONCE IV  Last administered on at 10:05;  Start 18 at 10:00;  Stop 18 at 10:59;  Status DC


Sodium Chloride (NS Flush) 2 ml UNSCH  PRN IVF FLUSH AFTER USING IV ACCESS;  

Start 18 at 08:00


Social History


ex-smoker.





Physical Exam


Vital Signs





Vital Signs








  Date Time  Temp Pulse Resp B/P (MAP) Pulse Ox O2 Delivery O2 Flow Rate FiO2


 


18 10:22     98 Nasal Cannula 2.00 


 


18 10:00  97 20 105/66 (79) 94 Room Air  


 


18 08:18  114 28 144/72 (96) 97 Room Air  


 


18 07:37 98.7 112 26 120/77 (91) 92   








Physical Exam


GENERAL: with some sob/ on oxygen via N/C.


SKIN: No rashes, ecchymoses or lesions. Cool and dry.


HEAD: Atraumatic. Normocephalic. No temporal or scalp tenderness.


EYES: Pupils equal round and reactive. Extraocular motions intact. No scleral 

icterus. No injection or drainage. 


ENT: Nose without bleeding, purulent drainage or septal hematoma. Throat 

without erythema, tonsillar hypertrophy or exudate. Uvula midline. Airway 

patent.


NECK: Trachea midline. No JVD or lymphadenopathy. Supple, nontender, no 

meningeal signs.


CARDIOVASCULAR: Regular rate and rhythm without murmurs, gallops, or rubs. 


RESPIRATORY: bilateral wheezing/ diminished air entry in bases.


GASTROINTESTINAL: Abdomen soft, non-tender, nondistended. No hepato-splenomegaly

, or palpable masses. No guarding.


MUSCULOSKELETAL: Extremities without clubbing, cyanosis, or edema. No joint 

tenderness, effusion, or edema noted. No calf tenderness. Negative Homans sign 

bilaterally.


NEUROLOGICAL: Awake and alert. Cranial nerves II through XII intact.  Motor and 

sensory grossly within normal limits. Five out of 5 muscle strength in all 

muscle groups.  Normal speech.


Laboratory





Laboratory Tests








Test


  18


08:45 18


10:06 18


10:07


 


White Blood Count 15.4   


 


Red Blood Count 5.05   


 


Hemoglobin 15.2   


 


Hematocrit 45.3   


 


Mean Corpuscular Volume 89.8   


 


Mean Corpuscular Hemoglobin 30.1   


 


Mean Corpuscular Hemoglobin


Concent 33.6 


  


  


 


 


Red Cell Distribution Width 13.2   


 


Platelet Count 173   


 


Mean Platelet Volume 9.4   


 


Neutrophils (%) (Auto) 87.8   


 


Lymphocytes (%) (Auto) 3.9   


 


Monocytes (%) (Auto) 7.9   


 


Eosinophils (%) (Auto) 0.0   


 


Basophils (%) (Auto) 0.4   


 


Neutrophils # (Auto) 13.5   


 


Lymphocytes # (Auto) 0.6   


 


Monocytes # (Auto) 1.2   


 


Eosinophils # (Auto) 0.0   


 


Basophils # (Auto) 0.1   


 


CBC Comment AUTO DIFF   


 


Differential Total Cells


Counted 100 


  


  


 


 


Neutrophils % (Manual) 75   


 


Band Neutrophils % 15   


 


Lymphocytes % 2   


 


Monocytes % 8   


 


Neutrophils # (Manual) 13.9   


 


Differential Comment


  FINAL DIFF


MANUAL 


  


 


 


Toxic Vacuolation PRESENT   


 


Platelet Estimate NORMAL   


 


Platelet Morphology Comment NORMAL   


 


Prothrombin Time 10.6   


 


Prothromb Time International


Ratio 1.0 


  


  


 


 


Activated Partial


Thromboplast Time 27.5 


  


  


 


 


D-Dimer Quantitative (PE/DVT) 2.28   


 


Blood Urea Nitrogen 21   


 


Creatinine 1.99   


 


Random Glucose 495   


 


Total Protein 8.1   


 


Albumin 3.8   


 


Calcium Level 9.7   


 


Magnesium Level 1.5   


 


Alkaline Phosphatase 115   


 


Aspartate Amino Transf


(AST/SGOT) 11 


  


  


 


 


Alanine Aminotransferase


(ALT/SGPT) 18 


  


  


 


 


Total Bilirubin 1.9   


 


Sodium Level 130   


 


Potassium Level 4.4   


 


Chloride Level 91   


 


Carbon Dioxide Level 24.2   


 


Anion Gap 15   


 


Estimat Glomerular Filtration


Rate 41 


  


  


 


 


Total Creatine Kinase 74   


 


Troponin I LESS THAN 0.02   


 


B-Type Natriuretic Peptide 7   


 


Lipase 330   


 


Lactic Acid Level  4.1  


 


Blood Gas Puncture Site   RT RADIAL 


 


Blood Gas Patient Temperature   98.6 


 


Blood Gas HCO3   22 


 


Blood Gas Base Excess   -2.0 


 


Blood Gas Oxygen Saturation   87 


 


Arterial Blood pH   7.41 


 


Arterial Blood Partial


Pressure CO2 


  


  35 


 


 


Arterial Blood Partial


Pressure O2 


  


  59 


 


 


Arterial Blood Oxygen Content   16.9 


 


Arterial Blood


Carboxyhemoglobin 


  


  1.8 


 


 


Arterial Blood Methemoglobin   0.7 


 


Blood Gas Hemoglobin   13.9 


 


Oxygen Delivery Device   ROOM AIR 


 


Blood Gas Inspired Oxygen   21 














 Date/Time


Source Procedure


Growth Status


 


 


 18 10:06


Blood Peripheral Aerobic Blood Culture


Pending Received


 


 18 10:06


Blood Peripheral Anaerobic Blood Culture


Pending Received


 


 18 10:00


Nasal Aspirate Influenza Types A,B Antigen (LEILANI) - Final


NEGATIVE FOR FLU A AND B ANTIGEN.... Complete








Result Diagram:  


18 0845                                                                   

             18 0845





Imaging





Last Impressions








Chest X-Ray 18 0753 Signed





Impressions: 





 CONCLUSION: No acute abnormality or significant interval change


 


Lower Extremity Ultrasound 18 0000 Signed





Impressions: 





 CONCLUSION: 











Caprini VTE Risk Assessment


Caprini VTE Risk Assessment:  Mod/High Risk (score >= 2)


Caprini Risk Assessment Model











 Point Value = 1          Point Value = 2  Point Value = 3  Point Value = 5


 


Age 41-60


Minor surgery


BMI > 25 kg/m2


Swollen legs


Varicose veins


Pregnancy or postpartum


History of unexplained or recurrent


   spontaneous 


Oral contraceptives or hormone


   replacement


Sepsis (< 1 month)


Serious lung disease, including


   pneumonia (< 1 month)


Abnormal pulmonary function


Acute myocardial infarction


Congestive heart failure (< 1 month)


History of inflammatory bowel disease


Medical patient at bed rest Age 61-74


Arthroscopic surgery


Major open surgery (> 45 min)


Laparoscopic surgery (> 45 min)


Malignancy


Confined to bed (> 72 hours)


Immobilizing plaster cast


Central venous access Age >= 75


History of VTE


Family history of VTE


Factor V Leiden


Prothrombin 17700W


Lupus anticoagulant


Anticardiolipin antibodies


Elevated serum homocysteine


Heparin-induced thrombocytopenia


Other congenital or acquired


   thrombophilia Stroke (< 1 month)


Elective arthroplasty


Hip, pelvis, or leg fracture


Acute spinal cord injury (< 1 month)








Prophylaxis Regimen











   Total Risk


Factor Score Risk Level Prophylaxis Regimen


 


0-1      Low Early ambulation


 


2 Moderate Order ONE of the following:


*Sequential Compression Device (SCD)


*Heparin 5000 units SQ BID


 


3-4 Higher Order ONE of the following medications:


*Heparin 5000 units SQ TID


*Enoxaparin/Lovenox 40 mg SQ daily (WT < 150 kg, CrCl > 30 mL/min)


*Enoxaparin/Lovenox 30 mg SQ daily (WT < 150 kg, CrCl > 10-29 mL/min)


*Enoxaparin/Lovenox 30 mg SQ BID (WT < 150 kg, CrCl > 30 mL/min)


AND/OR


*Sequential Compression Device (SCD)


 


5 or more Highest Order ONE of the following medications:


*Heparin 5000 units SQ TID (Preferred with Epidurals)


*Enoxaparin/Lovenox 40 mg SQ daily (WT < 150 kg, CrCl > 30 mL/min)


*Enoxaparin/Lovenox 30 mg SQ daily (WT < 150 kg, CrCl > 10-29 mL/min)


*Enoxaparin/Lovenox 30 mg SQ BID (WT < 150 kg, CrCl > 30 mL/min)


AND


*Sequential Compression Device (SCD)











Assessment and Plan


Assessment and Plan


A/P  





- acute hypoxemic respiratory failure due to COPD exacerbation/ possible 

pneumonia


  keep on oxygen to keep O2 sat >90%- continue with neb treatment; scheduled 

and prn.start on IV steroids.


  V/Q scan pending.


-severe sepsis- suspect pneumonia


 continue with IV antibiotics- follow the cultures.


-diabetes mellitus; uncontrolled-


 start on levemir and accu-check with SSI.


-hypertension; hold clonidine- continue lisinopril.


-CKD- will monitor the renal function.


-chronic pancreatitis/ chronic back pain; resume home meds.


-DVT prophylaxis with subq heparin


Discussed Condition With


ER physician and the patient/ RN.





Physician Certification


2 Midnight Certification Type:  Admission for Inpatient Services


Order for Inpatient Services


The services are ordered in accordance with Medicare regulations or non-

Medicare payer requirements, as applicable.  In the case of services not 

specified as inpatient-only, they are appropriately provided as inpatient 

services in accordance with the 2-midnight benchmark.


Estimated LOS (days):  2


 days is the estimated time the patient will need to remain in the hospital, 

assuming treatment plan goals are met and no additional complications.


Post-Hospital Plan:  Home





Problem Qualifiers





(1) Sepsis:  


Qualified Codes:  A41.9 - Sepsis, unspecified organism








Pacheco Patterson MD May 22, 2018 12:23

## 2018-05-23 VITALS
OXYGEN SATURATION: 97 % | DIASTOLIC BLOOD PRESSURE: 81 MMHG | SYSTOLIC BLOOD PRESSURE: 133 MMHG | TEMPERATURE: 98.1 F | RESPIRATION RATE: 16 BRPM | HEART RATE: 86 BPM

## 2018-05-23 VITALS
OXYGEN SATURATION: 95 % | SYSTOLIC BLOOD PRESSURE: 139 MMHG | DIASTOLIC BLOOD PRESSURE: 88 MMHG | RESPIRATION RATE: 24 BRPM | TEMPERATURE: 97.9 F | HEART RATE: 90 BPM

## 2018-05-23 VITALS
DIASTOLIC BLOOD PRESSURE: 86 MMHG | OXYGEN SATURATION: 96 % | HEART RATE: 91 BPM | SYSTOLIC BLOOD PRESSURE: 116 MMHG | RESPIRATION RATE: 20 BRPM | TEMPERATURE: 98.3 F

## 2018-05-23 VITALS
TEMPERATURE: 97.6 F | OXYGEN SATURATION: 96 % | RESPIRATION RATE: 24 BRPM | HEART RATE: 92 BPM | DIASTOLIC BLOOD PRESSURE: 86 MMHG | SYSTOLIC BLOOD PRESSURE: 122 MMHG

## 2018-05-23 VITALS — HEART RATE: 85 BPM

## 2018-05-23 VITALS — HEART RATE: 88 BPM

## 2018-05-23 VITALS — HEART RATE: 97 BPM

## 2018-05-23 VITALS — HEART RATE: 93 BPM

## 2018-05-23 VITALS — OXYGEN SATURATION: 98 %

## 2018-05-23 VITALS — HEART RATE: 84 BPM

## 2018-05-23 VITALS — OXYGEN SATURATION: 95 %

## 2018-05-23 VITALS — HEART RATE: 86 BPM

## 2018-05-23 VITALS
TEMPERATURE: 97.7 F | OXYGEN SATURATION: 96 % | SYSTOLIC BLOOD PRESSURE: 132 MMHG | HEART RATE: 84 BPM | RESPIRATION RATE: 22 BRPM | DIASTOLIC BLOOD PRESSURE: 83 MMHG

## 2018-05-23 VITALS — HEART RATE: 104 BPM

## 2018-05-23 VITALS — HEART RATE: 82 BPM

## 2018-05-23 VITALS — OXYGEN SATURATION: 96 %

## 2018-05-23 VITALS — HEART RATE: 90 BPM

## 2018-05-23 VITALS — HEART RATE: 94 BPM

## 2018-05-23 VITALS — HEART RATE: 92 BPM

## 2018-05-23 LAB
BASOPHILS # BLD AUTO: 0 TH/MM3 (ref 0–0.2)
BASOPHILS NFR BLD: 0.3 % (ref 0–2)
BUN SERPL-MCNC: 20 MG/DL (ref 7–18)
CALCIUM SERPL-MCNC: 8.7 MG/DL (ref 8.5–10.1)
CHLORIDE SERPL-SCNC: 97 MEQ/L (ref 98–107)
COLOR UR: YELLOW
CREAT SERPL-MCNC: 1.29 MG/DL (ref 0.6–1.3)
EOSINOPHIL # BLD: 0 TH/MM3 (ref 0–0.4)
EOSINOPHIL NFR BLD: 0 % (ref 0–4)
ERYTHROCYTE [DISTWIDTH] IN BLOOD BY AUTOMATED COUNT: 13.2 % (ref 11.6–17.2)
GFR SERPLBLD BASED ON 1.73 SQ M-ARVRAT: 67 ML/MIN (ref 89–?)
GLUCOSE SERPL-MCNC: 345 MG/DL (ref 74–106)
GLUCOSE UR STRIP-MCNC: 1000 MG/DL
HCO3 BLD-SCNC: 23.7 MEQ/L (ref 21–32)
HCT VFR BLD CALC: 40.7 % (ref 39–51)
HGB BLD-MCNC: 13.6 GM/DL (ref 13–17)
HGB UR QL STRIP: (no result)
KETONES UR STRIP-MCNC: (no result) MG/DL
LYMPHOCYTES # BLD AUTO: 0.7 TH/MM3 (ref 1–4.8)
LYMPHOCYTES NFR BLD AUTO: 4.1 % (ref 9–44)
MCH RBC QN AUTO: 30.4 PG (ref 27–34)
MCHC RBC AUTO-ENTMCNC: 33.4 % (ref 32–36)
MCV RBC AUTO: 91 FL (ref 80–100)
MONOCYTE #: 0.6 TH/MM3 (ref 0–0.9)
MONOCYTES NFR BLD: 3.5 % (ref 0–8)
NEUTROPHILS # BLD AUTO: 16.2 TH/MM3 (ref 1.8–7.7)
NEUTROPHILS NFR BLD AUTO: 92.1 % (ref 16–70)
NITRITE UR QL STRIP: (no result)
PLATELET # BLD: 141 TH/MM3 (ref 150–450)
PMV BLD AUTO: 9.8 FL (ref 7–11)
RBC # BLD AUTO: 4.47 MIL/MM3 (ref 4.5–5.9)
SODIUM SERPL-SCNC: 133 MEQ/L (ref 136–145)
SP GR UR STRIP: 1.02 (ref 1–1.03)
URINE LEUKOCYTE ESTERASE: (no result)
WBC # BLD AUTO: 17.6 TH/MM3 (ref 4–11)

## 2018-05-23 RX ADMIN — ACYCLOVIR SCH UNITS: 800 TABLET ORAL at 20:22

## 2018-05-23 RX ADMIN — IPRATROPIUM BROMIDE AND ALBUTEROL SULFATE SCH AMPULE: .5; 3 SOLUTION RESPIRATORY (INHALATION) at 07:25

## 2018-05-23 RX ADMIN — INSULIN ASPART SCH: 100 INJECTION, SOLUTION INTRAVENOUS; SUBCUTANEOUS at 08:00

## 2018-05-23 RX ADMIN — IPRATROPIUM BROMIDE AND ALBUTEROL SULFATE SCH AMPULE: .5; 3 SOLUTION RESPIRATORY (INHALATION) at 11:19

## 2018-05-23 RX ADMIN — PANCRELIPASE SCH CAP: 60000; 12000; 38000 CAPSULE, DELAYED RELEASE PELLETS ORAL at 17:39

## 2018-05-23 RX ADMIN — PANTOPRAZOLE SODIUM SCH MG: 20 TABLET, DELAYED RELEASE ORAL at 09:46

## 2018-05-23 RX ADMIN — IPRATROPIUM BROMIDE AND ALBUTEROL SULFATE SCH AMPULE: .5; 3 SOLUTION RESPIRATORY (INHALATION) at 22:53

## 2018-05-23 RX ADMIN — HUMAN INSULIN SCH: 100 INJECTION, SOLUTION SUBCUTANEOUS at 20:22

## 2018-05-23 RX ADMIN — IPRATROPIUM BROMIDE AND ALBUTEROL SULFATE SCH AMPULE: .5; 3 SOLUTION RESPIRATORY (INHALATION) at 03:41

## 2018-05-23 RX ADMIN — METOCLOPRAMIDE HYDROCHLORIDE SCH MG: 10 TABLET ORAL at 09:46

## 2018-05-23 RX ADMIN — IPRATROPIUM BROMIDE AND ALBUTEROL SULFATE SCH AMPULE: .5; 3 SOLUTION RESPIRATORY (INHALATION) at 00:14

## 2018-05-23 RX ADMIN — LISINOPRIL SCH MG: 10 TABLET ORAL at 09:46

## 2018-05-23 RX ADMIN — METHYLPREDNISOLONE SODIUM SUCCINATE SCH MG: 40 INJECTION, POWDER, FOR SOLUTION INTRAMUSCULAR; INTRAVENOUS at 06:33

## 2018-05-23 RX ADMIN — GUAIFENESIN PRN MG: 100 SOLUTION ORAL at 11:54

## 2018-05-23 RX ADMIN — GUAIFENESIN PRN MG: 100 SOLUTION ORAL at 00:47

## 2018-05-23 RX ADMIN — IPRATROPIUM BROMIDE AND ALBUTEROL SULFATE SCH AMPULE: .5; 3 SOLUTION RESPIRATORY (INHALATION) at 20:00

## 2018-05-23 RX ADMIN — PANCRELIPASE SCH CAP: 60000; 12000; 38000 CAPSULE, DELAYED RELEASE PELLETS ORAL at 09:45

## 2018-05-23 RX ADMIN — PHENYTOIN SODIUM SCH MLS/HR: 50 INJECTION INTRAMUSCULAR; INTRAVENOUS at 14:31

## 2018-05-23 RX ADMIN — HUMAN INSULIN SCH: 100 INJECTION, SOLUTION SUBCUTANEOUS at 17:30

## 2018-05-23 RX ADMIN — PHENYTOIN SODIUM SCH MLS/HR: 50 INJECTION INTRAMUSCULAR; INTRAVENOUS at 23:53

## 2018-05-23 RX ADMIN — PANCRELIPASE SCH CAP: 60000; 12000; 38000 CAPSULE, DELAYED RELEASE PELLETS ORAL at 12:10

## 2018-05-23 RX ADMIN — ASPIRIN SCH MG: 81 TABLET ORAL at 09:46

## 2018-05-23 RX ADMIN — GUAIFENESIN PRN MG: 100 SOLUTION ORAL at 20:17

## 2018-05-23 RX ADMIN — Medication PRN MG: at 23:53

## 2018-05-23 RX ADMIN — SERTRALINE HYDROCHLORIDE SCH MG: 50 TABLET, FILM COATED ORAL at 09:46

## 2018-05-23 RX ADMIN — INSULIN ASPART SCH: 100 INJECTION, SOLUTION INTRAVENOUS; SUBCUTANEOUS at 12:00

## 2018-05-23 RX ADMIN — IPRATROPIUM BROMIDE AND ALBUTEROL SULFATE SCH AMPULE: .5; 3 SOLUTION RESPIRATORY (INHALATION) at 15:24

## 2018-05-23 RX ADMIN — HEPARIN SODIUM SCH UNITS: 10000 INJECTION, SOLUTION INTRAVENOUS; SUBCUTANEOUS at 09:47

## 2018-05-23 RX ADMIN — HEPARIN SODIUM SCH UNITS: 10000 INJECTION, SOLUTION INTRAVENOUS; SUBCUTANEOUS at 20:17

## 2018-05-23 NOTE — PD.ID.CON
History of Present Illness


Service


ID


Consult Requested By


Dr Romero


Reason for Consult


GPC bacteremia


Primary Care Physician


Kadeem Larios, 


Diagnoses:  


History of Present Illness


Patient is a 67-year-old male with history of hypertension, hyperlipidemia, 

diabetes, asthma, COPD, pacemaker, pancreatitis, presents the emergency room 

for evaluation of chest pain and shortness of breath.  


Pt was admitted with sharp chest pain, cough and blood sugar of 300


Pt has leukocytosis of 15-17 K and bandemia of 15%


Bacteremia 4/4 with Gram positive cocci, ID'd as GBS


He was started on broad spectrum abx


He has a pacemaker put in 10 yrs ago


co chest pain, pleuritic, + cough





Review of Systems


Except as stated in HPI:  all other systems reviewed are Neg





Past Family Social History


Allergies:  


Coded Allergies:  


     No Known Drug Allergies (Verified  Allergy, Unknown, 5/22/18)


     MRI PRECAUTION (Verified  Adverse Reaction, Severe, PACEMAKER, 5/22/18)


 PT HAS PACEMAKER PER [facility]UR[facility]


     *MDRO Multi-Drug Resistant Organism (Verified  Adverse Reaction, Unknown, 

Cleared - 5/24/16, 5/22/18)


 MRSA PCR (nares) negative - 5/21/16 & 5/24/16


 ***Cleared per Infection Control***


 


 MRSA (blood and urine) - 2/2013


Past Medical History


DM2 


COPD 


CKD3 


HTN 


Chronic Pancreatitis 


Chronic Back Pain 


Hx of Bradycardia


Past Surgical History


Lumbar Spine surgery


Cholecystectomy


Right hand laceration repair


Active Ordered Medications


Medications where reviewed in EMR


Antibiotics Include:  vanco


 CFTX


 azithro


Family History


reviewed


Social History


  


ex-smoker.





Physical Exam


Vital Signs





Vital Signs








  Date Time  Temp Pulse Resp B/P (MAP) Pulse Ox O2 Delivery O2 Flow Rate FiO2


 


5/23/18 16:00  86      


 


5/23/18 15:00  84      


 


5/23/18 15:00 97.7 97 22 132/83 (99) 96   


 


5/23/18 14:00  97      


 


5/23/18 13:00  104      


 


5/23/18 12:00  93      


 


5/23/18 11:00 97.6 93 24 122/86 (98) 96   


 


5/23/18 11:00  92      


 


5/23/18 10:00  86      


 


5/23/18 09:00  90      


 


5/23/18 08:00  94      


 


5/23/18 07:27     98 Nasal Cannula 2.00 


 


5/23/18 07:00 97.9 90 24 139/88 (105) 95   


 


5/23/18 07:00  90      


 


5/23/18 04:42  88      


 


5/23/18 03:42     95 Nasal Cannula 2.00 


 


5/23/18 02:45  82      


 


5/23/18 01:53   16     


 


5/23/18 01:00  82      


 


5/23/18 00:44 98.3 91 20 138/86 (103) 96   





    116/75 (89)    


 


5/23/18 00:15     95 Nasal Cannula 2.00 


 


5/23/18 00:00  85      


 


5/23/18 00:00  88      


 


5/22/18 23:00  79      


 


5/22/18 22:00  85      


 


5/22/18 21:00  91      


 


5/22/18 20:55   16     


 


5/22/18 20:10  91      


 


5/22/18 20:00  94      


 


5/22/18 19:54  96 16 141/80 (100) 96   


 


5/22/18 19:51     100 Nasal Cannula 2.00 


 


5/22/18 19:00  96      


 


5/22/18 18:00  95      








Physical Exam


CONSTITUTIONAL/GENERAL: This is an adequately nourished patient, in no apparent 

distress.


TUBES/LINES/DRAINS:


SKIN: No jaundice, rashes, or lesions.   Skin temperature appropriate. Not 

diaphoretic. 


HEAD: Atraumatic. Normocephalic.


EYES: Pupils equal and round and reactive. Extraocular motions intact. No 

scleral icterus. No injection or drainage. Fundi not examined.


ENT: Hearing grossly normal. Nose without bleeding or purulent drainage. Throat 

without visible erythema, exudates, masses, or lesions.


NECK: Trachea midline. Supple, nontender. No palpable thyroid enlargement or 

nodularity. 


CARDIOVASCULAR: Regular rate and rhythm without murmurs, gallops, or rubs. No 

JVD. Peripheral pulses symmetric.


Pacer in place L chest 


RESPIRATORY/CHEST: Symmetric, unlabored respirations. Clear to auscultation. 

Breath sounds equal bilaterally. No wheezes, rales, or rhonchi.  


GASTROINTESTINAL: Abdomen soft, non-tender, nondistended. No hepato-splenomegaly

, or palpable masses. No guarding. Bowel sounds present.


GENITOURINARY: Without palpable bladder distension. 


MUSCULOSKELETAL: Extremities without clubbing, cyanosis, or edema. No joint 

tenderness or effusion noted. No calf tenderness. No mottling or clubbing.


LYMPHATICS: No palpable cervical or supraclavicular adenopathy.


NEUROLOGICAL: Awake and alert. Motor and sensory grossly within normal limits. 

Follows commands. Clear speech Moves all extremities.


PSYCHIATRIC: No obvious anxiety/depression. no apparent hallucinations or other 

psychotic thought process.


Laboratory





Laboratory Tests








Test


  5/22/18


20:20 5/23/18


05:23 5/23/18


11:00


 


Lactic Acid Level 4.5   


 


White Blood Count  17.6  


 


Red Blood Count  4.47  


 


Hemoglobin  13.6  


 


Hematocrit  40.7  


 


Mean Corpuscular Volume  91.0  


 


Mean Corpuscular Hemoglobin  30.4  


 


Mean Corpuscular Hemoglobin


Concent 


  33.4 


  


 


 


Red Cell Distribution Width  13.2  


 


Platelet Count  141  


 


Mean Platelet Volume  9.8  


 


Neutrophils (%) (Auto)  92.1  


 


Lymphocytes (%) (Auto)  4.1  


 


Monocytes (%) (Auto)  3.5  


 


Eosinophils (%) (Auto)  0.0  


 


Basophils (%) (Auto)  0.3  


 


Neutrophils # (Auto)  16.2  


 


Lymphocytes # (Auto)  0.7  


 


Monocytes # (Auto)  0.6  


 


Eosinophils # (Auto)  0.0  


 


Basophils # (Auto)  0.0  


 


CBC Comment  DIFF FINAL  


 


Differential Comment    


 


Blood Urea Nitrogen  20  


 


Creatinine  1.29  


 


Random Glucose  345  


 


Calcium Level  8.7  


 


Sodium Level  133  


 


Potassium Level  4.5  


 


Chloride Level  97  


 


Carbon Dioxide Level  23.7  


 


Anion Gap  12  


 


Estimat Glomerular Filtration


Rate 


  67 


  


 


 


Urine Color   YELLOW 


 


Urine Turbidity   CLEAR 


 


Urine pH   5.5 


 


Urine Specific Gravity   1.024 


 


Urine Protein   NEG 


 


Urine Glucose (UA)   1000 


 


Urine Ketones   NEG 


 


Urine Occult Blood   NEG 


 


Urine Nitrite   NEG 


 


Urine Bilirubin   NEG 


 


Urine Urobilinogen   LESS THAN 2.0 


 


Urine Leukocyte Esterase   NEG 


 


Urine WBC   LESS THAN 1 


 


Microscopic Urinalysis Comment


  


  


  CULT NOT


INDICATED














 Date/Time


Source Procedure


Growth Status


 


 


 5/22/18 10:06


Blood Peripheral Aerobic Blood Culture - Preliminary


Gram Positive Cocci Resulted


 


 5/22/18 10:06 Anaerobic Blood Culture - Preliminary


Gram Positive Cocci Resulted





 5/23/18 11:00


Sputum Expectorated Sputum Gram Stain - Final Resulted


 


 5/23/18 11:00


Sputum Expectorated Sputum Sputum Culture


Pending Resulted








Result Diagram:  


5/23/18 0523                                                                   

             5/23/18 0523





Imaging





Last Impressions








Chest X-Ray 5/22/18 9867 Signed





Impressions: 





 CONCLUSION: No acute abnormality or significant interval change





  





 


 


Lung Scan-VQ Nuclear Medicine 5/22/18 0000 Signed





Impressions: 





 CONCLUSION: 





 1.  Low probability for PE.





 2.  Patchy uptake of tracer activity on the ventilation study suggestive of air





 space disease.





  





 


 


Lower Extremity Ultrasound 5/22/18 0000 Signed





Impressions: 





 CONCLUSION: 





 1.  No evidence of DVT.





  





 











Assessment and Plan


Assessment and Plan


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


   r/o pacer infection





dc azithromycin


cont CFTX 2 gm daily


dc vancomycin


repeat BC











Katie Medina MD May 23, 2018 18:04

## 2018-05-23 NOTE — HHI.PR
Subjective


Remarks


feeling much better


now bringing up sputum- did not look at the color- discussed with staff to send 

specimen


good po no nausea no abdominal pain, no diarrhea


no urinary symptoms








up on the chair





Objective


Vitals





Vital Signs








  Date Time  Temp Pulse Resp B/P (MAP) Pulse Ox O2 Delivery O2 Flow Rate FiO2


 


5/23/18 07:27     98 Nasal Cannula 2.00 


 


5/23/18 07:00 97.9 90 24 139/88 (105) 95   


 


5/23/18 04:42  88      


 


5/23/18 03:42     95 Nasal Cannula 2.00 


 


5/23/18 02:45  82      


 


5/23/18 01:53   16     


 


5/23/18 01:00  82      


 


5/23/18 00:44 98.3 91 20 138/86 (103) 96   





    116/75 (89)    


 


5/23/18 00:15     95 Nasal Cannula 2.00 


 


5/23/18 00:00  85      


 


5/23/18 00:00  88      


 


5/22/18 23:00  79      


 


5/22/18 22:00  85      


 


5/22/18 21:00  91      


 


5/22/18 20:55   16     


 


5/22/18 20:10  91      


 


5/22/18 20:00  94      


 


5/22/18 19:54  96 16 141/80 (100) 96   


 


5/22/18 19:51     100 Nasal Cannula 2.00 


 


5/22/18 19:00  96      


 


5/22/18 18:00  95      


 


5/22/18 17:00  105      


 


5/22/18 16:00  102      


 


5/22/18 15:00  100      


 


5/22/18 15:00 98.6 99 24 139/92 (108) 93   


 


5/22/18 13:19        


 


5/22/18 13:00  94 20 132/78 (96) 94 Nasal Cannula 2.00 


 


5/22/18 10:22     98 Nasal Cannula 2.00 














I/O      


 


 5/22/18 5/22/18 5/22/18 5/23/18 5/23/18 5/23/18





 07:00 15:00 23:00 07:00 15:00 23:00


 


Intake Total  1100 ml 480 ml   


 


Output Total   620 ml 1300 ml  


 


Balance  1100 ml -140 ml -1300 ml  


 


      


 


Intake Oral   480 ml   


 


IV Total  1100 ml    


 


Output Urine Total   620 ml 1300 ml  








Result Diagram:  


5/23/18 0523 5/23/18 0523





Imaging





Last Impressions








Chest X-Ray 5/22/18 0753 Signed





Impressions: 





 CONCLUSION: No acute abnormality or significant interval change


 


Lung Scan-VQ Nuclear Medicine 5/22/18 0000 Signed





Impressions: 





 CONCLUSION: 


 


Lower Extremity Ultrasound 5/22/18 0000 Signed





Impressions: 





 CONCLUSION: 








Objective Remarks


awake and alert, no acute distress


anicteric


no nuchal rigidity


lungs + rales left base, no wheezes


regular rhtyhm


abdomen-soft good bwoel sounds


extremities no edema


neuro exam- non focal





A/P


Problem List:  


(1) Sepsis


ICD Code:  A41.9 - Sepsis, unspecified organism


Status:  Acute


Assessment and Plan


67 years old male





Gram Positive Severe sepsis secodnary to clinical Pneumonia


- start IV Vancomycin


- continue on current antibiotics


- ID consult for recommendation


- repeat blood cultures


- no skin lesions/marks- no history of IVDU- admits to smoking cocaine


- ff CBC- expect WBC to be high - on steroids





Acute hypoxemic respiratory failure due to COPD exacerbation- improved 

clinically


- heavy smoker in the past 


- lungs- no wheezes, + rales left base


  keep on oxygen to keep O2 sat >90%- continue with neb treatment; scheduled 


  V/Q scan negative 


- change to po steroids- prednisone 20 mg po bid


cotninue home inhalers


-


diabetes mellitus; uncontrolled-


- expect to be high on steroids


 start on levemir  - increase dose to bid 


- change to medium sliding scale Novolog


-while here and accu-check with SSI. nd adjust- expect to be high on steroids(

at home was on lantus bid)





hypertension


History fo AICD in place


; hold clonidine- continue lisinopril.





Acute kidney injury on top CKD-


- continue Fluids 75 cc/hr


-ff creatinine- trending down





History of chornic pancreatitis- on creon and reglan daily


History of AICD





chronic pancreatitis/ chronic back pain; resume home meds.


-DVT prophylaxis with subq heparin








d/w patient





Problem Qualifiers





(1) Sepsis:  


Qualified Codes:  A41.9 - Sepsis, unspecified organism








Lucy Romero MD May 23, 2018 10:12

## 2018-05-24 VITALS — OXYGEN SATURATION: 95 %

## 2018-05-24 VITALS
OXYGEN SATURATION: 95 % | TEMPERATURE: 97.7 F | RESPIRATION RATE: 18 BRPM | SYSTOLIC BLOOD PRESSURE: 128 MMHG | HEART RATE: 80 BPM | DIASTOLIC BLOOD PRESSURE: 78 MMHG

## 2018-05-24 VITALS
TEMPERATURE: 98 F | HEART RATE: 76 BPM | RESPIRATION RATE: 18 BRPM | OXYGEN SATURATION: 93 % | DIASTOLIC BLOOD PRESSURE: 84 MMHG | SYSTOLIC BLOOD PRESSURE: 126 MMHG

## 2018-05-24 VITALS
SYSTOLIC BLOOD PRESSURE: 126 MMHG | RESPIRATION RATE: 20 BRPM | HEART RATE: 82 BPM | OXYGEN SATURATION: 96 % | TEMPERATURE: 97.7 F | DIASTOLIC BLOOD PRESSURE: 83 MMHG

## 2018-05-24 VITALS — HEART RATE: 76 BPM

## 2018-05-24 VITALS — HEART RATE: 80 BPM

## 2018-05-24 VITALS
SYSTOLIC BLOOD PRESSURE: 123 MMHG | RESPIRATION RATE: 17 BRPM | OXYGEN SATURATION: 91 % | HEART RATE: 86 BPM | DIASTOLIC BLOOD PRESSURE: 71 MMHG | TEMPERATURE: 98.6 F

## 2018-05-24 VITALS
RESPIRATION RATE: 16 BRPM | TEMPERATURE: 98.3 F | HEART RATE: 74 BPM | SYSTOLIC BLOOD PRESSURE: 137 MMHG | DIASTOLIC BLOOD PRESSURE: 89 MMHG | OXYGEN SATURATION: 92 %

## 2018-05-24 VITALS
OXYGEN SATURATION: 95 % | TEMPERATURE: 98.5 F | RESPIRATION RATE: 18 BRPM | HEART RATE: 84 BPM | SYSTOLIC BLOOD PRESSURE: 144 MMHG | DIASTOLIC BLOOD PRESSURE: 83 MMHG

## 2018-05-24 VITALS — HEART RATE: 84 BPM

## 2018-05-24 VITALS — HEART RATE: 74 BPM

## 2018-05-24 VITALS — HEART RATE: 94 BPM

## 2018-05-24 VITALS — HEART RATE: 82 BPM

## 2018-05-24 VITALS — HEART RATE: 86 BPM

## 2018-05-24 VITALS — HEART RATE: 72 BPM

## 2018-05-24 LAB
ALBUMIN SERPL-MCNC: 2.8 GM/DL (ref 3.4–5)
ALP SERPL-CCNC: 100 U/L (ref 45–117)
ALT SERPL-CCNC: 13 U/L (ref 12–78)
AST SERPL-CCNC: 11 U/L (ref 15–37)
BASOPHILS # BLD AUTO: 0 TH/MM3 (ref 0–0.2)
BASOPHILS NFR BLD: 0.1 % (ref 0–2)
BILIRUB SERPL-MCNC: 0.4 MG/DL (ref 0.2–1)
BUN SERPL-MCNC: 23 MG/DL (ref 7–18)
CALCIUM SERPL-MCNC: 8.2 MG/DL (ref 8.5–10.1)
CHLORIDE SERPL-SCNC: 100 MEQ/L (ref 98–107)
CREAT SERPL-MCNC: 1.12 MG/DL (ref 0.6–1.3)
EOSINOPHIL # BLD: 0 TH/MM3 (ref 0–0.4)
EOSINOPHIL NFR BLD: 0 % (ref 0–4)
ERYTHROCYTE [DISTWIDTH] IN BLOOD BY AUTOMATED COUNT: 13.5 % (ref 11.6–17.2)
GFR SERPLBLD BASED ON 1.73 SQ M-ARVRAT: 79 ML/MIN (ref 89–?)
GLUCOSE SERPL-MCNC: 334 MG/DL (ref 74–106)
HBA1C MFR BLD: 11.7 % (ref 4.3–6)
HCO3 BLD-SCNC: 23.9 MEQ/L (ref 21–32)
HCT VFR BLD CALC: 38.3 % (ref 39–51)
HGB BLD-MCNC: 12.8 GM/DL (ref 13–17)
LYMPHOCYTES # BLD AUTO: 0.6 TH/MM3 (ref 1–4.8)
LYMPHOCYTES NFR BLD AUTO: 4.3 % (ref 9–44)
MCH RBC QN AUTO: 30 PG (ref 27–34)
MCHC RBC AUTO-ENTMCNC: 33.3 % (ref 32–36)
MCV RBC AUTO: 89.9 FL (ref 80–100)
MONOCYTE #: 1.1 TH/MM3 (ref 0–0.9)
MONOCYTES NFR BLD: 7.1 % (ref 0–8)
NEUTROPHILS # BLD AUTO: 13.3 TH/MM3 (ref 1.8–7.7)
NEUTROPHILS NFR BLD AUTO: 88.5 % (ref 16–70)
PLATELET # BLD: 132 TH/MM3 (ref 150–450)
PMV BLD AUTO: 9.1 FL (ref 7–11)
PROT SERPL-MCNC: 6.5 GM/DL (ref 6.4–8.2)
RBC # BLD AUTO: 4.26 MIL/MM3 (ref 4.5–5.9)
SODIUM SERPL-SCNC: 134 MEQ/L (ref 136–145)
WBC # BLD AUTO: 15.1 TH/MM3 (ref 4–11)

## 2018-05-24 RX ADMIN — HUMAN INSULIN SCH: 100 INJECTION, SOLUTION SUBCUTANEOUS at 08:50

## 2018-05-24 RX ADMIN — PANCRELIPASE SCH CAP: 60000; 12000; 38000 CAPSULE, DELAYED RELEASE PELLETS ORAL at 12:29

## 2018-05-24 RX ADMIN — IPRATROPIUM BROMIDE AND ALBUTEROL SULFATE SCH AMPULE: .5; 3 SOLUTION RESPIRATORY (INHALATION) at 15:28

## 2018-05-24 RX ADMIN — HUMAN INSULIN SCH: 100 INJECTION, SOLUTION SUBCUTANEOUS at 22:02

## 2018-05-24 RX ADMIN — IPRATROPIUM BROMIDE AND ALBUTEROL SULFATE SCH AMPULE: .5; 3 SOLUTION RESPIRATORY (INHALATION) at 11:24

## 2018-05-24 RX ADMIN — SODIUM CHLORIDE SCH MLS/HR: 900 INJECTION INTRAVENOUS at 17:52

## 2018-05-24 RX ADMIN — CEFTRIAXONE SODIUM SCH MLS/HR: 2 INJECTION, POWDER, FOR SOLUTION INTRAMUSCULAR; INTRAVENOUS at 09:21

## 2018-05-24 RX ADMIN — IPRATROPIUM BROMIDE AND ALBUTEROL SULFATE SCH AMPULE: .5; 3 SOLUTION RESPIRATORY (INHALATION) at 07:51

## 2018-05-24 RX ADMIN — LISINOPRIL SCH MG: 10 TABLET ORAL at 08:53

## 2018-05-24 RX ADMIN — GUAIFENESIN PRN MG: 100 SOLUTION ORAL at 21:48

## 2018-05-24 RX ADMIN — PHENYTOIN SODIUM SCH MLS/HR: 50 INJECTION INTRAMUSCULAR; INTRAVENOUS at 12:29

## 2018-05-24 RX ADMIN — HUMAN INSULIN SCH: 100 INJECTION, SOLUTION SUBCUTANEOUS at 17:52

## 2018-05-24 RX ADMIN — IPRATROPIUM BROMIDE AND ALBUTEROL SULFATE SCH AMPULE: .5; 3 SOLUTION RESPIRATORY (INHALATION) at 23:49

## 2018-05-24 RX ADMIN — ACYCLOVIR SCH UNITS: 800 TABLET ORAL at 21:53

## 2018-05-24 RX ADMIN — IPRATROPIUM BROMIDE AND ALBUTEROL SULFATE SCH AMPULE: .5; 3 SOLUTION RESPIRATORY (INHALATION) at 19:40

## 2018-05-24 RX ADMIN — METOCLOPRAMIDE HYDROCHLORIDE SCH MG: 10 TABLET ORAL at 08:52

## 2018-05-24 RX ADMIN — HUMAN INSULIN SCH: 100 INJECTION, SOLUTION SUBCUTANEOUS at 12:29

## 2018-05-24 RX ADMIN — HEPARIN SODIUM SCH UNITS: 10000 INJECTION, SOLUTION INTRAVENOUS; SUBCUTANEOUS at 08:52

## 2018-05-24 RX ADMIN — SERTRALINE HYDROCHLORIDE SCH MG: 50 TABLET, FILM COATED ORAL at 08:52

## 2018-05-24 RX ADMIN — PANTOPRAZOLE SODIUM SCH MG: 20 TABLET, DELAYED RELEASE ORAL at 08:52

## 2018-05-24 RX ADMIN — IPRATROPIUM BROMIDE AND ALBUTEROL SULFATE SCH AMPULE: .5; 3 SOLUTION RESPIRATORY (INHALATION) at 04:14

## 2018-05-24 RX ADMIN — GUAIFENESIN PRN MG: 100 SOLUTION ORAL at 03:24

## 2018-05-24 RX ADMIN — PANCRELIPASE SCH CAP: 60000; 12000; 38000 CAPSULE, DELAYED RELEASE PELLETS ORAL at 17:51

## 2018-05-24 RX ADMIN — Medication PRN MG: at 21:49

## 2018-05-24 RX ADMIN — ASPIRIN SCH MG: 81 TABLET ORAL at 08:52

## 2018-05-24 RX ADMIN — ACYCLOVIR SCH UNITS: 800 TABLET ORAL at 08:53

## 2018-05-24 RX ADMIN — PHENYTOIN SODIUM SCH MLS/HR: 50 INJECTION INTRAMUSCULAR; INTRAVENOUS at 22:45

## 2018-05-24 RX ADMIN — PANCRELIPASE SCH CAP: 60000; 12000; 38000 CAPSULE, DELAYED RELEASE PELLETS ORAL at 08:53

## 2018-05-24 RX ADMIN — HEPARIN SODIUM SCH UNITS: 10000 INJECTION, SOLUTION INTRAVENOUS; SUBCUTANEOUS at 21:52

## 2018-05-24 NOTE — HHI.PR
Subjective


Remarks


less sputum production


no nausea or vomiting


afebrile, no chills





Objective


Vitals





Vital Signs








  Date Time  Temp Pulse Resp B/P (MAP) Pulse Ox O2 Delivery O2 Flow Rate FiO2


 


5/24/18 11:00  79      


 


5/24/18 11:00 97.7 82 20 126/83 (97) 96   


 


5/24/18 10:00  84      


 


5/24/18 09:00  84      


 


5/24/18 08:00  74      


 


5/24/18 07:54     95   21


 


5/24/18 07:28 98.0 76 18 126/84 (98) 93   


 


5/24/18 07:00  72      


 


5/24/18 06:00  74      


 


5/24/18 05:00  76      


 


5/24/18 04:00 98.3 80 16 137/89 (105) 92   


 


5/24/18 04:00  74      


 


5/24/18 03:00  80      


 


5/24/18 02:00  94      


 


5/24/18 01:00  84      


 


5/24/18 00:00 98.6 91 17 123/71 (88) 91   


 


5/24/18 00:00  86      


 


5/23/18 23:00  84      


 


5/23/18 22:00  86      


 


5/23/18 21:05     96 Nasal Cannula 2.00 


 


5/23/18 21:00  92      


 


5/23/18 20:00  101      


 


5/23/18 20:00 98.1 86 16 133/81 (98) 97   


 


5/23/18 19:00  82      


 


5/23/18 18:00  84      


 


5/23/18 17:00  82      


 


5/23/18 16:00  86      


 


5/23/18 15:00  84      


 


5/23/18 15:00 97.7 97 22 132/83 (99) 96   


 


5/23/18 14:00  97      


 


5/23/18 13:00  104      














I/O      


 


 5/23/18 5/23/18 5/23/18 5/24/18 5/24/18 5/24/18





 07:00 15:00 23:00 07:00 15:00 23:00


 


Intake Total   1424 ml 680 ml  


 


Output Total 1300 ml  550 ml 625 ml  


 


Balance -1300 ml  874 ml 55 ml  


 


      


 


Intake Oral   1424 ml 680 ml  


 


Output Urine Total 1300 ml  550 ml 625 ml  


 


# Voids   4   








Result Diagram:  


5/24/18 0616                                                                   

             5/24/18 0616





Imaging





Last Impressions








Chest X-Ray 5/22/18 0753 Signed





Impressions: 





 CONCLUSION: No acute abnormality or significant interval change





  





 


 


Lung Scan-VQ Nuclear Medicine 5/22/18 0000 Signed





Impressions: 





 CONCLUSION: 





 1.  Low probability for PE.





 2.  Patchy uptake of tracer activity on the ventilation study suggestive of air





 space disease.





  





 


 


Lower Extremity Ultrasound 5/22/18 0000 Signed





Impressions: 





 CONCLUSION: 





 1.  No evidence of DVT.





  





 








Objective Remarks


awake and alert, no acute distress


anicteric


no nuchal rigidity


lungs + rales left base, no wheezes on exam


regular rhythm


abdomen-soft good bowel sounds


extremities no edema


neuro exam- non focal





A/P


Problem List:  


(1) Sepsis


ICD Code:  A41.9 - Sepsis, unspecified organism


Status:  Acute


Assessment and Plan


67 years old male





Gram Positive Severe sepsis secondary to clinical Pneumonia-


- started on Ceftriaxone 2 gm daily 5/23


- ID ff 


- repeat blood cultures to ensure clearance 


- no skin lesions/marks- no history of IVDU- admits to smoking cocaine


- ff CBC- expect WBC to be high - on steroids





Acute hypoxemic respiratory failure due to COPD exacerbation- improved 

clinically


- heavy smoker in the past 


- lungs- no wheezes, + rales left base


  keep on oxygen to keep O2 sat >90%- continue with neb treatment; scheduled 


  V/Q scan negative 


- change to po steroids- prednisone 20 mg po bid- taper off 


cotninue home inhalers


-


diabetes mellitus; uncontrolled-


- expect to be high on steroids


 start on levemir  - increase dose to bid 


- change to medium sliding scale Novolog


-while here and accu-check with SSI. nd adjust- expect to be high on steroids(

at home was on lantus bid)





hypertension


History of AICD


- hold clonidine- continue lisinopril.





Acute kidney injury on top CKD-


- continue Fluids 75 cc/hr


-ff creatinine- trending down





History of chornic pancreatitis- on creon and reglan daily





chronic pancreatitis/ chronic back pain; resume home meds.


-DVT prophylaxis with subq heparin








d/w patient - up and ambualting





Problem Qualifiers





(1) Sepsis:  


Qualified Codes:  A41.9 - Sepsis, unspecified organism








Lucy Romero MD May 24, 2018 12:58

## 2018-05-25 VITALS
HEART RATE: 64 BPM | DIASTOLIC BLOOD PRESSURE: 94 MMHG | OXYGEN SATURATION: 96 % | SYSTOLIC BLOOD PRESSURE: 149 MMHG | TEMPERATURE: 97.7 F | RESPIRATION RATE: 30 BRPM

## 2018-05-25 VITALS
SYSTOLIC BLOOD PRESSURE: 134 MMHG | TEMPERATURE: 97.7 F | RESPIRATION RATE: 22 BRPM | HEART RATE: 90 BPM | OXYGEN SATURATION: 96 % | DIASTOLIC BLOOD PRESSURE: 83 MMHG

## 2018-05-25 VITALS — HEART RATE: 82 BPM

## 2018-05-25 VITALS — HEART RATE: 76 BPM

## 2018-05-25 VITALS
TEMPERATURE: 98.3 F | RESPIRATION RATE: 20 BRPM | DIASTOLIC BLOOD PRESSURE: 91 MMHG | SYSTOLIC BLOOD PRESSURE: 154 MMHG | HEART RATE: 77 BPM | OXYGEN SATURATION: 96 %

## 2018-05-25 VITALS
SYSTOLIC BLOOD PRESSURE: 143 MMHG | HEART RATE: 82 BPM | OXYGEN SATURATION: 98 % | TEMPERATURE: 97.7 F | DIASTOLIC BLOOD PRESSURE: 89 MMHG | RESPIRATION RATE: 24 BRPM

## 2018-05-25 VITALS — HEART RATE: 68 BPM

## 2018-05-25 VITALS — HEART RATE: 66 BPM

## 2018-05-25 VITALS — HEART RATE: 72 BPM

## 2018-05-25 VITALS — HEART RATE: 86 BPM

## 2018-05-25 VITALS
DIASTOLIC BLOOD PRESSURE: 78 MMHG | OXYGEN SATURATION: 93 % | SYSTOLIC BLOOD PRESSURE: 142 MMHG | HEART RATE: 77 BPM | RESPIRATION RATE: 16 BRPM

## 2018-05-25 VITALS — HEART RATE: 64 BPM

## 2018-05-25 VITALS — OXYGEN SATURATION: 94 %

## 2018-05-25 VITALS — HEART RATE: 63 BPM

## 2018-05-25 VITALS — HEART RATE: 79 BPM

## 2018-05-25 VITALS — OXYGEN SATURATION: 95 %

## 2018-05-25 VITALS — HEART RATE: 80 BPM

## 2018-05-25 VITALS — HEART RATE: 90 BPM

## 2018-05-25 LAB
BASOPHILS # BLD AUTO: 0 TH/MM3 (ref 0–0.2)
BASOPHILS NFR BLD: 0.2 % (ref 0–2)
BUN SERPL-MCNC: 16 MG/DL (ref 7–18)
CALCIUM SERPL-MCNC: 8.1 MG/DL (ref 8.5–10.1)
CHLORIDE SERPL-SCNC: 101 MEQ/L (ref 98–107)
CREAT SERPL-MCNC: 1.01 MG/DL (ref 0.6–1.3)
EOSINOPHIL # BLD: 0.1 TH/MM3 (ref 0–0.4)
EOSINOPHIL NFR BLD: 0.8 % (ref 0–4)
ERYTHROCYTE [DISTWIDTH] IN BLOOD BY AUTOMATED COUNT: 13.6 % (ref 11.6–17.2)
GFR SERPLBLD BASED ON 1.73 SQ M-ARVRAT: 89 ML/MIN (ref 89–?)
GLUCOSE SERPL-MCNC: 273 MG/DL (ref 74–106)
HCO3 BLD-SCNC: 23.6 MEQ/L (ref 21–32)
HCT VFR BLD CALC: 40 % (ref 39–51)
HGB BLD-MCNC: 13.1 GM/DL (ref 13–17)
LYMPHOCYTES # BLD AUTO: 1.3 TH/MM3 (ref 1–4.8)
LYMPHOCYTES NFR BLD AUTO: 14.6 % (ref 9–44)
MCH RBC QN AUTO: 29.4 PG (ref 27–34)
MCHC RBC AUTO-ENTMCNC: 32.7 % (ref 32–36)
MCV RBC AUTO: 90.1 FL (ref 80–100)
MONOCYTE #: 0.9 TH/MM3 (ref 0–0.9)
MONOCYTES NFR BLD: 9.4 % (ref 0–8)
NEUTROPHILS # BLD AUTO: 6.9 TH/MM3 (ref 1.8–7.7)
NEUTROPHILS NFR BLD AUTO: 75 % (ref 16–70)
PLATELET # BLD: 155 TH/MM3 (ref 150–450)
PMV BLD AUTO: 9.1 FL (ref 7–11)
RBC # BLD AUTO: 4.44 MIL/MM3 (ref 4.5–5.9)
SODIUM SERPL-SCNC: 135 MEQ/L (ref 136–145)
WBC # BLD AUTO: 9.2 TH/MM3 (ref 4–11)

## 2018-05-25 RX ADMIN — LISINOPRIL SCH MG: 20 TABLET ORAL at 09:58

## 2018-05-25 RX ADMIN — HEPARIN SODIUM SCH UNITS: 10000 INJECTION, SOLUTION INTRAVENOUS; SUBCUTANEOUS at 20:12

## 2018-05-25 RX ADMIN — IPRATROPIUM BROMIDE AND ALBUTEROL SULFATE SCH AMPULE: .5; 3 SOLUTION RESPIRATORY (INHALATION) at 15:47

## 2018-05-25 RX ADMIN — ACYCLOVIR SCH UNITS: 800 TABLET ORAL at 09:57

## 2018-05-25 RX ADMIN — HUMAN INSULIN SCH: 100 INJECTION, SOLUTION SUBCUTANEOUS at 08:00

## 2018-05-25 RX ADMIN — SODIUM CHLORIDE SCH MLS/HR: 900 INJECTION INTRAVENOUS at 17:36

## 2018-05-25 RX ADMIN — PANCRELIPASE SCH CAP: 60000; 12000; 38000 CAPSULE, DELAYED RELEASE PELLETS ORAL at 09:55

## 2018-05-25 RX ADMIN — ACYCLOVIR SCH UNITS: 800 TABLET ORAL at 20:12

## 2018-05-25 RX ADMIN — ASPIRIN SCH MG: 81 TABLET ORAL at 09:57

## 2018-05-25 RX ADMIN — HUMAN INSULIN SCH: 100 INJECTION, SOLUTION SUBCUTANEOUS at 20:24

## 2018-05-25 RX ADMIN — PANCRELIPASE SCH CAP: 60000; 12000; 38000 CAPSULE, DELAYED RELEASE PELLETS ORAL at 13:47

## 2018-05-25 RX ADMIN — PANCRELIPASE SCH CAP: 60000; 12000; 38000 CAPSULE, DELAYED RELEASE PELLETS ORAL at 17:34

## 2018-05-25 RX ADMIN — IPRATROPIUM BROMIDE AND ALBUTEROL SULFATE SCH AMPULE: .5; 3 SOLUTION RESPIRATORY (INHALATION) at 03:18

## 2018-05-25 RX ADMIN — IPRATROPIUM BROMIDE AND ALBUTEROL SULFATE SCH AMPULE: .5; 3 SOLUTION RESPIRATORY (INHALATION) at 07:42

## 2018-05-25 RX ADMIN — IPRATROPIUM BROMIDE AND ALBUTEROL SULFATE SCH AMPULE: .5; 3 SOLUTION RESPIRATORY (INHALATION) at 23:59

## 2018-05-25 RX ADMIN — PHENYTOIN SODIUM SCH MLS/HR: 50 INJECTION INTRAMUSCULAR; INTRAVENOUS at 13:51

## 2018-05-25 RX ADMIN — METOCLOPRAMIDE HYDROCHLORIDE SCH MG: 10 TABLET ORAL at 09:57

## 2018-05-25 RX ADMIN — HUMAN INSULIN SCH: 100 INJECTION, SOLUTION SUBCUTANEOUS at 17:37

## 2018-05-25 RX ADMIN — TIOTROPIUM BROMIDE SCH MCG: 18 CAPSULE ORAL; RESPIRATORY (INHALATION) at 13:47

## 2018-05-25 RX ADMIN — HUMAN INSULIN SCH: 100 INJECTION, SOLUTION SUBCUTANEOUS at 13:46

## 2018-05-25 RX ADMIN — IPRATROPIUM BROMIDE AND ALBUTEROL SULFATE SCH AMPULE: .5; 3 SOLUTION RESPIRATORY (INHALATION) at 11:07

## 2018-05-25 RX ADMIN — PHENYTOIN SODIUM SCH MLS/HR: 50 INJECTION INTRAMUSCULAR; INTRAVENOUS at 17:35

## 2018-05-25 RX ADMIN — IPRATROPIUM BROMIDE AND ALBUTEROL SULFATE SCH AMPULE: .5; 3 SOLUTION RESPIRATORY (INHALATION) at 20:56

## 2018-05-25 RX ADMIN — SERTRALINE HYDROCHLORIDE SCH MG: 50 TABLET, FILM COATED ORAL at 09:55

## 2018-05-25 RX ADMIN — SODIUM CHLORIDE SCH MLS/HR: 900 INJECTION INTRAVENOUS at 05:00

## 2018-05-25 RX ADMIN — CEFTRIAXONE SODIUM SCH MLS/HR: 2 INJECTION, POWDER, FOR SOLUTION INTRAMUSCULAR; INTRAVENOUS at 09:57

## 2018-05-25 RX ADMIN — HEPARIN SODIUM SCH UNITS: 10000 INJECTION, SOLUTION INTRAVENOUS; SUBCUTANEOUS at 09:56

## 2018-05-25 RX ADMIN — PANTOPRAZOLE SODIUM SCH MG: 20 TABLET, DELAYED RELEASE ORAL at 09:58

## 2018-05-25 NOTE — HHI.PR
Subjective


Remarks


states cough sputum less productive and loose


complains of epigastric discomfort no nausea or vomiting





Objective


Vitals





Vital Signs








  Date Time  Temp Pulse Resp B/P (MAP) Pulse Ox O2 Delivery O2 Flow Rate FiO2


 


5/25/18 07:46     94   21


 


5/25/18 07:15 97.7 64 30 149/94 (112) 96   


 


5/25/18 04:28  77 16 142/78 (99) 93   


 


5/25/18 04:24  72      


 


5/25/18 03:00  72      


 


5/25/18 02:00  68      


 


5/25/18 01:00  76      


 


5/25/18 00:00  80      


 


5/25/18 00:00  80      


 


5/24/18 23:00  82      


 


5/24/18 22:00  82      


 


5/24/18 21:45 98.5 84 18 144/83 (103) 95   


 


5/24/18 21:00  84      


 


5/24/18 20:00  80      


 


5/24/18 20:00  86      


 


5/24/18 19:43     95   


 


5/24/18 19:00  76      


 


5/24/18 18:00  80      


 


5/24/18 17:00  80      


 


5/24/18 16:00  84      


 


5/24/18 15:30     95   


 


5/24/18 15:25 97.7 80 18 128/78 (95) 95   


 


5/24/18 15:00  82      


 


5/24/18 14:00  86      


 


5/24/18 13:00  80      


 


5/24/18 12:00  82      


 


5/24/18 11:00  79      


 


5/24/18 11:00 97.7 82 20 126/83 (97) 96   


 


5/24/18 10:00  84      


 


5/24/18 09:00  84      


 


5/24/18 08:00  74      














I/O      


 


 5/24/18 5/24/18 5/24/18 5/25/18 5/25/18 5/25/18





 07:00 15:00 23:00 07:00 15:00 23:00


 


Intake Total 680 ml  680 ml 240 ml  


 


Output Total 625 ml  1360 ml 875 ml  


 


Balance 55 ml  -680 ml -635 ml  


 


      


 


Intake Oral 680 ml  680 ml 240 ml  


 


Output Urine Total 625 ml  1360 ml 875 ml  








Result Diagram:  


5/24/18 0616                                                                   

             5/24/18 0616





Imaging





Last Impressions








Chest X-Ray 5/22/18 0753 Signed





Impressions: 





 CONCLUSION: No acute abnormality or significant interval change





  





 


 


Lung Scan-VQ Nuclear Medicine 5/22/18 0000 Signed





Impressions: 





 CONCLUSION: 





 1.  Low probability for PE.





 2.  Patchy uptake of tracer activity on the ventilation study suggestive of air





 space disease.





  





 


 


Lower Extremity Ultrasound 5/22/18 0000 Signed





Impressions: 





 CONCLUSION: 





 1.  No evidence of DVT.





  





 








Objective Remarks


awake and alert, no acute distress


anicteric


no nuchal rigidity


lungs + rales left base, no wheezes on exam, 


regular rhythm


abdomen-soft good bowel sounds, + mild epigastric tenderness


extremities no edema


neuro exam- non focal





A/P


Problem List:  


(1) Sepsis


ICD Code:  A41.9 - Sepsis, unspecified organism


Status:  Acute


Assessment and Plan


67 years old male





Group B strep sepsis secondary to clinical Pneumonia-- sputum MRSA


- started on Ceftriaxone 2 gm daily 5/23


- Vancomycin added 5/24


- ID ff 


- repeat blood cultures - ff


- no skin lesions/marks- no history of IVDU- admits to smoking cocaine


- ff CBC- expect WBC to be high - on steroids





Acute hypoxemic respiratory failure due to COPD exacerbation- improved 

clinically


- heavy smoker in the past 


- lungs- no wheezes, + rales left base


  keep on oxygen to keep O2 sat >90%- continue with neb treatment; scheduled 


  V/Q scan negative 


- change to po steroids- prednisone 20 mg po bid- taper off 


- continue  home inhalers


-


diabetes mellitus; uncontrolled-a1C 11.7


- expect to be high on steroids


 start on levemir  - increase dose to bid 20 


- medium sliding scale Novolog


-while here and accu-check with SSI. nd adjust- expect to be high on steroids(

at home was on lantus bid)


- continue to adjust





hypertension


History of AICD


-continue lisinopril- increase to 20 mg daily. some elevated readings- 


- clonidine prn








Acute kidney injury on top CKD-


- continue Fluids 75 cc/hr


-ff creatinine- trending down





History of chronic pancreatitis- now with epigastric discopmfort- check lipase


 on creon and reglan daily





chronic back pain; resume home meds.


-DVT prophylaxis with subq heparin








d/w patient - up and increase activity





Problem Qualifiers





(1) Sepsis:  


Qualified Codes:  A41.9 - Sepsis, unspecified organism








Lucy Romero MD May 25, 2018 08:00

## 2018-05-26 VITALS
RESPIRATION RATE: 20 BRPM | SYSTOLIC BLOOD PRESSURE: 159 MMHG | TEMPERATURE: 97.1 F | HEART RATE: 60 BPM | DIASTOLIC BLOOD PRESSURE: 97 MMHG | OXYGEN SATURATION: 99 %

## 2018-05-26 VITALS
DIASTOLIC BLOOD PRESSURE: 93 MMHG | TEMPERATURE: 98 F | SYSTOLIC BLOOD PRESSURE: 161 MMHG | HEART RATE: 70 BPM | OXYGEN SATURATION: 96 % | RESPIRATION RATE: 22 BRPM

## 2018-05-26 VITALS — HEART RATE: 66 BPM

## 2018-05-26 VITALS — OXYGEN SATURATION: 99 %

## 2018-05-26 VITALS — HEART RATE: 60 BPM

## 2018-05-26 VITALS — HEART RATE: 67 BPM

## 2018-05-26 VITALS
TEMPERATURE: 98.2 F | DIASTOLIC BLOOD PRESSURE: 79 MMHG | HEART RATE: 72 BPM | OXYGEN SATURATION: 95 % | RESPIRATION RATE: 22 BRPM | SYSTOLIC BLOOD PRESSURE: 141 MMHG

## 2018-05-26 VITALS
HEART RATE: 76 BPM | OXYGEN SATURATION: 96 % | SYSTOLIC BLOOD PRESSURE: 129 MMHG | DIASTOLIC BLOOD PRESSURE: 87 MMHG | TEMPERATURE: 96.9 F | RESPIRATION RATE: 24 BRPM

## 2018-05-26 VITALS
SYSTOLIC BLOOD PRESSURE: 139 MMHG | HEART RATE: 60 BPM | OXYGEN SATURATION: 96 % | RESPIRATION RATE: 24 BRPM | DIASTOLIC BLOOD PRESSURE: 83 MMHG | TEMPERATURE: 97.7 F

## 2018-05-26 VITALS — OXYGEN SATURATION: 95 %

## 2018-05-26 VITALS
RESPIRATION RATE: 22 BRPM | OXYGEN SATURATION: 95 % | TEMPERATURE: 98.4 F | SYSTOLIC BLOOD PRESSURE: 147 MMHG | DIASTOLIC BLOOD PRESSURE: 88 MMHG | HEART RATE: 70 BPM

## 2018-05-26 VITALS — HEART RATE: 69 BPM

## 2018-05-26 VITALS — HEART RATE: 73 BPM

## 2018-05-26 VITALS — HEART RATE: 76 BPM

## 2018-05-26 VITALS — HEART RATE: 63 BPM

## 2018-05-26 VITALS — HEART RATE: 71 BPM

## 2018-05-26 VITALS — HEART RATE: 74 BPM

## 2018-05-26 VITALS — HEART RATE: 64 BPM

## 2018-05-26 VITALS — HEART RATE: 68 BPM

## 2018-05-26 VITALS — HEART RATE: 82 BPM

## 2018-05-26 RX ADMIN — PANCRELIPASE SCH CAP: 60000; 12000; 38000 CAPSULE, DELAYED RELEASE PELLETS ORAL at 14:00

## 2018-05-26 RX ADMIN — ALBUTEROL SULFATE PRN MG: 1.25 SOLUTION RESPIRATORY (INHALATION) at 21:16

## 2018-05-26 RX ADMIN — PANCRELIPASE SCH CAP: 60000; 12000; 38000 CAPSULE, DELAYED RELEASE PELLETS ORAL at 17:26

## 2018-05-26 RX ADMIN — SODIUM CHLORIDE SCH MLS/HR: 900 INJECTION INTRAVENOUS at 17:26

## 2018-05-26 RX ADMIN — ASPIRIN SCH MG: 81 TABLET ORAL at 10:34

## 2018-05-26 RX ADMIN — HUMAN INSULIN SCH: 100 INJECTION, SOLUTION SUBCUTANEOUS at 17:25

## 2018-05-26 RX ADMIN — IPRATROPIUM BROMIDE AND ALBUTEROL SULFATE SCH AMPULE: .5; 3 SOLUTION RESPIRATORY (INHALATION) at 11:40

## 2018-05-26 RX ADMIN — HUMAN INSULIN SCH: 100 INJECTION, SOLUTION SUBCUTANEOUS at 08:00

## 2018-05-26 RX ADMIN — CEFTRIAXONE SODIUM SCH MLS/HR: 2 INJECTION, POWDER, FOR SOLUTION INTRAMUSCULAR; INTRAVENOUS at 11:57

## 2018-05-26 RX ADMIN — PANCRELIPASE SCH CAP: 60000; 12000; 38000 CAPSULE, DELAYED RELEASE PELLETS ORAL at 10:33

## 2018-05-26 RX ADMIN — PANTOPRAZOLE SODIUM SCH MG: 20 TABLET, DELAYED RELEASE ORAL at 10:33

## 2018-05-26 RX ADMIN — HEPARIN SODIUM SCH UNITS: 10000 INJECTION, SOLUTION INTRAVENOUS; SUBCUTANEOUS at 10:32

## 2018-05-26 RX ADMIN — SODIUM CHLORIDE SCH MLS/HR: 900 INJECTION INTRAVENOUS at 05:33

## 2018-05-26 RX ADMIN — ALBUTEROL SULFATE PRN MG: 1.25 SOLUTION RESPIRATORY (INHALATION) at 16:36

## 2018-05-26 RX ADMIN — TIOTROPIUM BROMIDE SCH MCG: 18 CAPSULE ORAL; RESPIRATORY (INHALATION) at 10:35

## 2018-05-26 RX ADMIN — Medication PRN MG: at 20:13

## 2018-05-26 RX ADMIN — Medication PRN MG: at 01:35

## 2018-05-26 RX ADMIN — LISINOPRIL SCH MG: 20 TABLET ORAL at 10:34

## 2018-05-26 RX ADMIN — METOCLOPRAMIDE HYDROCHLORIDE SCH MG: 10 TABLET ORAL at 10:34

## 2018-05-26 RX ADMIN — IPRATROPIUM BROMIDE AND ALBUTEROL SULFATE SCH AMPULE: .5; 3 SOLUTION RESPIRATORY (INHALATION) at 03:42

## 2018-05-26 RX ADMIN — GUAIFENESIN PRN MG: 100 SOLUTION ORAL at 20:29

## 2018-05-26 RX ADMIN — IPRATROPIUM BROMIDE AND ALBUTEROL SULFATE SCH AMPULE: .5; 3 SOLUTION RESPIRATORY (INHALATION) at 08:00

## 2018-05-26 RX ADMIN — ACYCLOVIR SCH UNITS: 800 TABLET ORAL at 10:30

## 2018-05-26 RX ADMIN — HUMAN INSULIN SCH: 100 INJECTION, SOLUTION SUBCUTANEOUS at 20:29

## 2018-05-26 RX ADMIN — ACYCLOVIR SCH UNITS: 800 TABLET ORAL at 20:28

## 2018-05-26 RX ADMIN — GUAIFENESIN PRN MG: 100 SOLUTION ORAL at 01:36

## 2018-05-26 RX ADMIN — HEPARIN SODIUM SCH UNITS: 10000 INJECTION, SOLUTION INTRAVENOUS; SUBCUTANEOUS at 20:13

## 2018-05-26 RX ADMIN — SERTRALINE HYDROCHLORIDE SCH MG: 50 TABLET, FILM COATED ORAL at 10:34

## 2018-05-26 RX ADMIN — GUAIFENESIN PRN MG: 100 SOLUTION ORAL at 10:32

## 2018-05-26 RX ADMIN — HUMAN INSULIN SCH: 100 INJECTION, SOLUTION SUBCUTANEOUS at 13:40

## 2018-05-26 NOTE — HHI.IDPN
Subjective


Subjective


Remarks


pt has no fever


no c/o


2 D echo results are P


Antibiotics


CFTX


vanco


Allergies:  


Coded Allergies:  


     No Known Drug Allergies (Verified  Allergy, Unknown, 5/22/18)


     MRI PRECAUTION (Verified  Adverse Reaction, Severe, PACEMAKER, 5/22/18)


 PT HAS PACEMAKER PER [facility]


     *MDRO Multi-Drug Resistant Organism (Verified  Adverse Reaction, Unknown, 

Cleared - 5/24/16, 5/22/18)


 MRSA PCR (nares) negative - 5/21/16 & 5/24/16


 ***Cleared per Infection Control***


 


 MRSA (blood and urine) - 2/2013





Objective


.





Vital Signs








  Date Time  Temp Pulse Resp B/P (MAP) Pulse Ox O2 Delivery O2 Flow Rate FiO2


 


5/26/18 06:00  63      


 


5/26/18 05:00  66      


 


5/26/18 04:00  72      


 


5/26/18 04:00 98.2 72 22 141/79 (99) 95   


 


5/26/18 03:00  69      


 


5/26/18 02:00  71      


 


5/26/18 01:00  68      


 


5/26/18 00:00      Room Air  


 


5/26/18 00:00  74      


 


5/26/18 00:00 98.4 70 22 147/88 (107) 95   


 


5/25/18 23:00  63      


 


5/25/18 22:00  68      


 


5/25/18 21:00  79      


 


5/25/18 20:58     95   21


 


5/25/18 20:00 98.3 77 20 154/91 (112) 96   


 


5/25/18 20:00  72      


 


5/25/18 18:00  90      


 


5/25/18 17:00  82      


 


5/25/18 16:00  64      


 


5/25/18 15:00 97.7 64 24 143/89 (107) 98   


 


5/25/18 15:00  82      


 


5/25/18 14:00  76      


 


5/25/18 13:00  76      


 


5/25/18 12:00  72      


 


5/25/18 11:00  86      


 


5/25/18 11:00  90      


 


5/25/18 11:00 97.7 76 22 134/83 (100) 96   








.





Laboratory Tests








Test


  5/25/18


12:10


 


White Blood Count 9.2 TH/MM3 


 


Red Blood Count 4.44 MIL/MM3 


 


Hemoglobin 13.1 GM/DL 


 


Hematocrit 40.0 % 


 


Mean Corpuscular Volume 90.1 FL 


 


Mean Corpuscular Hemoglobin 29.4 PG 


 


Mean Corpuscular Hemoglobin


Concent 32.7 % 


 


 


Red Cell Distribution Width 13.6 % 


 


Platelet Count 155 TH/MM3 


 


Mean Platelet Volume 9.1 FL 


 


Neutrophils (%) (Auto) 75.0 % 


 


Lymphocytes (%) (Auto) 14.6 % 


 


Monocytes (%) (Auto) 9.4 % 


 


Eosinophils (%) (Auto) 0.8 % 


 


Basophils (%) (Auto) 0.2 % 


 


Neutrophils # (Auto) 6.9 TH/MM3 


 


Lymphocytes # (Auto) 1.3 TH/MM3 


 


Monocytes # (Auto) 0.9 TH/MM3 


 


Eosinophils # (Auto) 0.1 TH/MM3 


 


Basophils # (Auto) 0.0 TH/MM3 


 


CBC Comment DIFF FINAL 


 


Differential Comment  








Laboratory Tests








Test


  5/25/18


12:10


 


Blood Urea Nitrogen 16 MG/DL 


 


Creatinine 1.01 MG/DL 


 


Random Glucose 273 MG/DL 


 


Calcium Level 8.1 MG/DL 


 


Sodium Level 135 MEQ/L 


 


Potassium Level 3.8 MEQ/L 


 


Chloride Level 101 MEQ/L 


 


Carbon Dioxide Level 23.6 MEQ/L 


 


Anion Gap 10 MEQ/L 


 


Estimat Glomerular Filtration


Rate 89 ML/MIN 


 


 


Lipase 137 U/L 








Microbiology








 Date/Time


Source Procedure


Growth Status


 


 


 5/24/18 07:10


Blood Peripheral Aerobic Blood Culture - Preliminary


NO GROWTH IN 1 DAY Resulted


 


 5/24/18 07:10


Blood Peripheral Anaerobic Blood Culture - Preliminary


NO GROWTH IN 1 DAY Resulted





 5/24/18 06:16


Blood Peripheral Aerobic Blood Culture - Preliminary


NO GROWTH IN 1 DAY Resulted


 


 5/24/18 06:16


Blood Peripheral Anaerobic Blood Culture - Preliminary


NO GROWTH IN 1 DAY Resulted





 5/23/18 11:00


Sputum Expectorated Sputum Gram Stain - Final Complete


 


 5/23/18 11:00 Sputum Culture - Final


S. Aureus Mrsa Complete








Imaging





Last Impressions








Chest X-Ray 5/22/18 0753 Signed





Impressions: 





 CONCLUSION: No acute abnormality or significant interval change





  





 


 


Lung Scan-VQ Nuclear Medicine 5/22/18 0000 Signed





Impressions: 





 CONCLUSION: 





 1.  Low probability for PE.





 2.  Patchy uptake of tracer activity on the ventilation study suggestive of air





 space disease.





  





 


 


Lower Extremity Ultrasound 5/22/18 0000 Signed





Impressions: 





 CONCLUSION: 





 1.  No evidence of DVT.





  





 








Physical Exam


CONSTITUTIONAL/GENERAL: This is an adequately nourished patient, in no apparent 

distress.


TUBES/LINES/DRAINS:


SKIN: No jaundice, rashes, or lesions.   Skin temperature appropriate. Not 

diaphoretic. 


 CARDIOVASCULAR: Regular rate and rhythm without murmurs, gallops, or rubs. No 

JVD. Peripheral pulses symmetric.


Pacer in place L chest 


RESPIRATORY/CHEST: Symmetric, unlabored respirations. Clear to auscultation. 

Breath sounds equal bilaterally. No wheezes, rales, or rhonchi.  


GASTROINTESTINAL: Abdomen soft, non-tender, nondistended. No hepato-splenomegaly

, or palpable masses. No guarding. Bowel sounds present.


 MUSCULOSKELETAL: Extremities without clubbing, cyanosis, or edema. No joint 

tenderness or effusion noted. No calf tenderness. No mottling or clubbing.


 NEUROLOGICAL: Awake and alert. Motor and sensory grossly within normal limits. 

Follows commands. Clear speech Moves all extremities.


PSYCHIATRIC: No obvious anxiety/depression. no apparent hallucinations or other 

psychotic thought process.








Assessment & Plan


Remarks


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


   r/o pacer infection


? PNA


   - CXR likely to reflect IPF


   - MRSA in sputum - / colonised 





 cont CFTX 2 gm daily


cont  vancomycin


repeat BC


awaiting 2 D [facility]


CT chest 





cardiology consult - Katie Astorga MD May 26, 2018 11:21

## 2018-05-26 NOTE — RADRPT
EXAM DATE:  5/26/2018 11:30 PM EDT

AGE/SEX:        67 years / Male



INDICATIONS:  Evaluate for pneumonia.



CLINICAL DATA:  This is the patient's initial encounter. Patient reports that signs and symptoms have
 been present for 1 day and indicates a pain score of 0/10. 

                                                                          

MEDICAL/SURGICAL HISTORY:   Cardiovascular disease.  Pancreatitis.  Renal failure, chronic.  Diabetes
  Cholecystectomy.



RADIATION DOSE:  8.19 CTDI (mGy)







COMPARISON:      [facility], CT CHEST W/O CONTRAST, 9/13/2017.  .



TECHNIQUE:  Multiple contiguous axial images were obtained through the chest without contrast.  Image
s were obtained in suspended respiration using multiple row detector helical technique.  Using automa
avis exposure control and adjustment of the mA and/or kV according to patient size, radiation dose was
 kept as low as reasonably achievable to obtain optimal diagnostic quality images.



FINDINGS: 

Mild chronic emphysema and interstitial changes are again noted. There is mild bibasilar consolidatio
n and very small bilateral pleural effusions. No evidence of a pneumonic infiltrate. The 1 cm nodule 
in the right middle lobe is unchanged. Scattered calcified pleural plaques are unchanged.



Prominent caliber thoracic aorta with the descending portion measuring approximately 4.1 cm. This is 
stable. No abrupt caliber changes are demonstrated. Atherosclerosis again noted. There is mild bivent
ricular enlargement of the heart. Patient has a cardiac pacer.



No acute bony abnormality demonstrated.



CONCLUSION:

1.  Trace compressive/dependent atelectasis of both bases and tiny bilateral pleural effusions. A sli
ght degree of failure is conceivable. No pneumonic infiltrate demonstrated.

2.  Mild emphysema and mild chronic interstitial changes are again noted. Stable, benign nodule in th
e right mid lung.

3.  Stable diffuse prominent caliber of the thoracic aorta up to 4.1 cm.



Electronically signed by: Allen Lomeli MD  5/26/2018 11:39 PM EDT

## 2018-05-26 NOTE — HHI.PR
Subjective


Remarks


patient bringing up thick sputum greenish 


breathing better


no diarrhea





Objective


Vitals





Vital Signs








  Date Time  Temp Pulse Resp B/P (MAP) Pulse Ox O2 Delivery O2 Flow Rate FiO2


 


5/26/18 06:00  63      


 


5/26/18 05:00  66      


 


5/26/18 04:00  72      


 


5/26/18 04:00 98.2 72 22 141/79 (99) 95   


 


5/26/18 03:00  69      


 


5/26/18 02:00  71      


 


5/26/18 01:00  68      


 


5/26/18 00:00      Room Air  


 


5/26/18 00:00  74      


 


5/26/18 00:00 98.4 70 22 147/88 (107) 95   


 


5/25/18 23:00  63      


 


5/25/18 22:00  68      


 


5/25/18 21:00  79      


 


5/25/18 20:58     95   21


 


5/25/18 20:00 98.3 77 20 154/91 (112) 96   


 


5/25/18 20:00  72      


 


5/25/18 18:00  90      


 


5/25/18 17:00  82      


 


5/25/18 16:00  64      


 


5/25/18 15:00 97.7 64 24 143/89 (107) 98   


 


5/25/18 15:00  82      


 


5/25/18 14:00  76      


 


5/25/18 13:00  76      


 


5/25/18 12:00  72      














I/O      


 


 5/25/18 5/25/18 5/25/18 5/26/18 5/26/18 5/26/18





 07:00 15:00 23:00 07:00 15:00 23:00


 


Intake Total 240 ml  740 ml 480 ml  


 


Output Total 875 ml  880 ml 900 ml  


 


Balance -635 ml  -140 ml -420 ml  


 


      


 


Intake Oral 240 ml  740 ml 480 ml  


 


Output Urine Total 875 ml  880 ml 900 ml  


 


# Bowel Movements    1  








Result Diagram:  


5/25/18 1210                                                                   

             5/25/18 1210





Imaging





Last Impressions








Chest X-Ray 5/22/18 7863 Signed





Impressions: 





 CONCLUSION: No acute abnormality or significant interval change





  





 


 


Lung Scan-VQ Nuclear Medicine 5/22/18 0000 Signed





Impressions: 





 CONCLUSION: 





 1.  Low probability for PE.





 2.  Patchy uptake of tracer activity on the ventilation study suggestive of air





 space disease.





  





 


 


Lower Extremity Ultrasound 5/22/18 0000 Signed





Impressions: 





 CONCLUSION: 





 1.  No evidence of DVT.





  





 








Objective Remarks


awake and alert, no acute distress


anicteric


no nuchal rigidity


lungs + rales left base- improved , no wheezes on exam, no rhonchi 


regular rhythm


abdomen-soft good bowel sounds, nontender


extremities no edema


neuro exam- non focal





A/P


Problem List:  


(1) Sepsis


ICD Code:  A41.9 - Sepsis, unspecified organism


Status:  Acute


Assessment and Plan


67 years old male





Group B strept sepsis secondary to clinical Pneumonia-- sputum MRSA


- started on Ceftriaxone 2 gm daily 5/23


- Vancomycin added 5/24


- ID ff 


- repeat blood cultures - negative so far- ff


- no skin lesions/marks- no history of IVDU- admits to smoking cocaine


- ff CBC- expect WBC to be high - on steroids





Acute hypoxemic respiratory failure due to COPD exacerbation- improved 

clinically


- heavy smoker in the past 


- lungs- no wheezes, + rales left base


  keep on oxygen to keep O2 sat >90%- continue with neb treatment; scheduled 


  V/Q scan negative 


- change to po steroids- prednisone 20 mg po bid- taper off - decreasd to once 

daily tstarting today 5/26


- continue  home inhalers


-


diabetes mellitus; uncontrolled-a1C 11.7


- expect to be high on steroids


 start on levemir  - increase dose to bid 20 


- medium sliding scale Novolog


-while here and accu-check with SSI. nd adjust- expect to be high on steroids(

at home was on lantus bid)


- continue to adjust





hypertension


History of AICD


- continue lisinopril- increase to 20 mg daily. some elevated readings- 


Clonidine prn





Acute kidney injury on top CKD- Improved


- continue Fluids 75 cc/hr- DC


-ff creatinine- improved- will monitor off fluids- patient states good po intake





History of chronic pancreatitis-


 on creon and reglan daily





chronic back pain; resume home meds.


-DVT prophylaxis with subq heparin








d/w patient - up and increase activity





Problem Qualifiers





(1) Sepsis:  


Qualified Codes:  A41.9 - Sepsis, unspecified organism








Lucy Romero MD May 26, 2018 11:13

## 2018-05-27 VITALS — HEART RATE: 71 BPM

## 2018-05-27 VITALS — HEART RATE: 86 BPM

## 2018-05-27 VITALS
RESPIRATION RATE: 20 BRPM | OXYGEN SATURATION: 97 % | TEMPERATURE: 98.3 F | SYSTOLIC BLOOD PRESSURE: 138 MMHG | DIASTOLIC BLOOD PRESSURE: 95 MMHG | HEART RATE: 65 BPM

## 2018-05-27 VITALS
TEMPERATURE: 98.7 F | SYSTOLIC BLOOD PRESSURE: 159 MMHG | OXYGEN SATURATION: 97 % | DIASTOLIC BLOOD PRESSURE: 94 MMHG | HEART RATE: 66 BPM | RESPIRATION RATE: 18 BRPM

## 2018-05-27 VITALS — HEART RATE: 60 BPM

## 2018-05-27 VITALS
RESPIRATION RATE: 20 BRPM | DIASTOLIC BLOOD PRESSURE: 96 MMHG | SYSTOLIC BLOOD PRESSURE: 143 MMHG | OXYGEN SATURATION: 96 % | HEART RATE: 68 BPM | TEMPERATURE: 97.6 F

## 2018-05-27 VITALS — HEART RATE: 70 BPM

## 2018-05-27 VITALS
OXYGEN SATURATION: 95 % | RESPIRATION RATE: 24 BRPM | SYSTOLIC BLOOD PRESSURE: 151 MMHG | HEART RATE: 79 BPM | DIASTOLIC BLOOD PRESSURE: 96 MMHG | TEMPERATURE: 97.7 F

## 2018-05-27 VITALS
OXYGEN SATURATION: 95 % | RESPIRATION RATE: 26 BRPM | DIASTOLIC BLOOD PRESSURE: 91 MMHG | HEART RATE: 87 BPM | TEMPERATURE: 97.9 F | SYSTOLIC BLOOD PRESSURE: 144 MMHG

## 2018-05-27 VITALS
RESPIRATION RATE: 18 BRPM | TEMPERATURE: 98.6 F | HEART RATE: 80 BPM | SYSTOLIC BLOOD PRESSURE: 140 MMHG | DIASTOLIC BLOOD PRESSURE: 83 MMHG | OXYGEN SATURATION: 97 %

## 2018-05-27 VITALS — HEART RATE: 84 BPM

## 2018-05-27 VITALS — HEART RATE: 69 BPM

## 2018-05-27 VITALS — HEART RATE: 63 BPM

## 2018-05-27 VITALS — HEART RATE: 72 BPM

## 2018-05-27 VITALS — HEART RATE: 76 BPM

## 2018-05-27 VITALS — HEART RATE: 65 BPM

## 2018-05-27 VITALS — HEART RATE: 74 BPM

## 2018-05-27 LAB
BUN SERPL-MCNC: 18 MG/DL (ref 7–18)
CALCIUM SERPL-MCNC: 9.2 MG/DL (ref 8.5–10.1)
CHLORIDE SERPL-SCNC: 95 MEQ/L (ref 98–107)
CREAT SERPL-MCNC: 1.24 MG/DL (ref 0.6–1.3)
GFR SERPLBLD BASED ON 1.73 SQ M-ARVRAT: 70 ML/MIN (ref 89–?)
GLUCOSE SERPL-MCNC: 252 MG/DL (ref 74–106)
HCO3 BLD-SCNC: 28.7 MEQ/L (ref 21–32)
SODIUM SERPL-SCNC: 130 MEQ/L (ref 136–145)

## 2018-05-27 RX ADMIN — SODIUM CHLORIDE SCH MLS/HR: 900 INJECTION INTRAVENOUS at 04:42

## 2018-05-27 RX ADMIN — HUMAN INSULIN SCH: 100 INJECTION, SOLUTION SUBCUTANEOUS at 12:20

## 2018-05-27 RX ADMIN — PANTOPRAZOLE SODIUM SCH MG: 20 TABLET, DELAYED RELEASE ORAL at 10:27

## 2018-05-27 RX ADMIN — TIOTROPIUM BROMIDE SCH MCG: 18 CAPSULE ORAL; RESPIRATORY (INHALATION) at 10:28

## 2018-05-27 RX ADMIN — CEFTRIAXONE SODIUM SCH MLS/HR: 2 INJECTION, POWDER, FOR SOLUTION INTRAMUSCULAR; INTRAVENOUS at 10:26

## 2018-05-27 RX ADMIN — ASPIRIN SCH MG: 81 TABLET ORAL at 10:27

## 2018-05-27 RX ADMIN — METOCLOPRAMIDE HYDROCHLORIDE SCH MG: 10 TABLET ORAL at 10:29

## 2018-05-27 RX ADMIN — LISINOPRIL SCH MG: 20 TABLET ORAL at 20:15

## 2018-05-27 RX ADMIN — SERTRALINE HYDROCHLORIDE SCH MG: 50 TABLET, FILM COATED ORAL at 10:27

## 2018-05-27 RX ADMIN — HEPARIN SODIUM SCH UNITS: 10000 INJECTION, SOLUTION INTRAVENOUS; SUBCUTANEOUS at 20:16

## 2018-05-27 RX ADMIN — Medication PRN MG: at 20:15

## 2018-05-27 RX ADMIN — HEPARIN SODIUM SCH UNITS: 10000 INJECTION, SOLUTION INTRAVENOUS; SUBCUTANEOUS at 10:28

## 2018-05-27 RX ADMIN — LISINOPRIL SCH MG: 20 TABLET ORAL at 10:27

## 2018-05-27 RX ADMIN — PANCRELIPASE SCH CAP: 60000; 12000; 38000 CAPSULE, DELAYED RELEASE PELLETS ORAL at 10:26

## 2018-05-27 RX ADMIN — HUMAN INSULIN SCH: 100 INJECTION, SOLUTION SUBCUTANEOUS at 11:36

## 2018-05-27 RX ADMIN — HUMAN INSULIN SCH: 100 INJECTION, SOLUTION SUBCUTANEOUS at 20:17

## 2018-05-27 RX ADMIN — ACYCLOVIR SCH UNITS: 800 TABLET ORAL at 20:16

## 2018-05-27 RX ADMIN — ACYCLOVIR SCH UNITS: 800 TABLET ORAL at 10:30

## 2018-05-27 RX ADMIN — SODIUM CHLORIDE SCH MLS/HR: 900 INJECTION INTRAVENOUS at 18:11

## 2018-05-27 RX ADMIN — PANCRELIPASE SCH CAP: 60000; 12000; 38000 CAPSULE, DELAYED RELEASE PELLETS ORAL at 16:07

## 2018-05-27 RX ADMIN — HUMAN INSULIN SCH: 100 INJECTION, SOLUTION SUBCUTANEOUS at 18:11

## 2018-05-27 RX ADMIN — PANCRELIPASE SCH CAP: 60000; 12000; 38000 CAPSULE, DELAYED RELEASE PELLETS ORAL at 18:12

## 2018-05-27 RX ADMIN — GUAIFENESIN PRN MG: 100 SOLUTION ORAL at 20:27

## 2018-05-27 NOTE — ECHRPT
Indication:   Bacteremia 

 

 CONCLUSIONS 

 

 Normal left ventricular size.  

 Mild concentric left ventricular hypertrophy.  

 The left ventricular systolic function is mildly reduced with an estimated ejection fraction in the 
range of 45- 

 50%.  

 

 A pacemaker wire is noted.  

 There is a pacemaker wire present in the right atrial cavity.  

 Trace-to-mild mitral valve regurgitation.  

 

 

 There is moderate to severe tricuspid valve regurgitation.  

 The estimated pulmonary arterial pressure is 52.8 mmHg.  

 Mild pulmonary valve regurgitation.  

 

 BP:        /         HR: 61                       Rhythm:           Sinus 

 

 MEASUREMENTS  (Male / Female) Normal Values       Technical Quality:Fair 

 2D ECHO 

 LV Diastolic Diameter PLAX        4.8 cm                4.2 - 5.9 / 3.9 - 5.3 cm 

 LV Systolic Diameter PLAX         4.0 cm                 

 IVS Diastolic Thickness           1.2 cm                0.6 - 1.0 / 0.6 - 0.9 cm 

 LVPW Diastolic Thickness          0.8 cm                0.6 - 1.0 / 0.6 - 0.9 cm 

 LV Relative Wall Thickness        0.4                    

 RV Internal Dim ED PLAX           3.0 cm                 

 LA Systolic Diameter LX           2.8 cm                3.0 - 4.0 / 2.7 - 3.8 cm 

 

 DOPPLER 

 TR Peak Velocity                  327.0 cm/s             

 TR Peak Gradient                  42.8 mmHg              

 Right Atrial Pressure             10.0 mmHg              

 Pulmonary Artery Systolic Pressu  52.8 mmHg              

 Right Ventricular Systolic Press  52.8 mmHg              

 

 

 FINDINGS 

 

 LEFT VENTRICLE 

 Normal left ventricular size.  

 Mild concentric left ventricular hypertrophy.  

 The left ventricular systolic function is mildly reduced with an estimated ejection fraction in the 
range of 45- 

 50%.  

 

 

 RIGHT VENTRICLE 

 A pacemaker wire is noted.  

 

 RIGHT ATRIUM 

 There is a pacemaker wire present in the right atrial cavity.  

 

 

 MITRAL VALVE 

 Trace-to-mild mitral valve regurgitation.  

 

 AORTIC VALVE 

 Trileaflet aortic valve. No aortic valve stenosis or regurgitation.  

 

 TRICUSPID VALVE 

 Structurally normal tricuspid valve.  

 There is moderate to severe tricuspid valve regurgitation.  

 The estimated pulmonary arterial pressure is 52.8 mmHg.  

 

 PULMONARY VALVE 

 Mild pulmonary valve regurgitation.  

 

 

 

 

 

  Nicholas Sandhu MD, FACC 

  (Electronically Signed) 

  Final Date:27 May 2018 17:52

## 2018-05-27 NOTE — HHI.PR
Subjective


Remarks


no complains of pain, shortness of breath, chest pain/abdominal pain


Had a good BM last evening





BS better- 180, 190





Objective


Vitals





Vital Signs








  Date Time  Temp Pulse Resp B/P (MAP) Pulse Ox O2 Delivery O2 Flow Rate FiO2


 


5/27/18 08:25 97.7 79 24 151/96 (114) 95   


 


5/27/18 06:00  69      


 


5/27/18 05:00  65      


 


5/27/18 04:00 98.7 66 18 159/94 (115) 97   


 


5/27/18 04:00      Nasal Cannula 2.00 


 


5/27/18 04:00  66      


 


5/27/18 03:00  69      


 


5/27/18 02:00  70      


 


5/27/18 01:00  71      


 


5/27/18 00:00  65      


 


5/27/18 00:00      Nasal Cannula 2.00 


 


5/27/18 00:00 98.3 65 20 138/95 (109) 97   


 


5/26/18 23:00  68      


 


5/26/18 22:00  67      


 


5/26/18 21:22     99   21


 


5/26/18 21:00  73      


 


5/26/18 20:00  70      


 


5/26/18 20:00 98.0 70 22 145/93 (110) 96   


 


5/26/18 20:00      Nasal Cannula 2.00 


 


5/26/18 18:00  76      


 


5/26/18 17:00  76      


 


5/26/18 16:00 97.7 60 24 139/83 (101) 96   


 


5/26/18 16:00  60      


 


5/26/18 15:00  64      


 


5/26/18 14:00  74      


 


5/26/18 13:00  82      


 


5/26/18 12:00  76      


 


5/26/18 12:00 96.9 76 24 129/87 (101) 96   


 


5/26/18 11:15     95   


 


5/26/18 11:00  60      














I/O      


 


 5/26/18 5/26/18 5/26/18 5/27/18 5/27/18 5/27/18





 07:00 15:00 23:00 07:00 15:00 23:00


 


Intake Total 480 ml  600 ml 480 ml  


 


Output Total 900 ml  750 ml 800 ml  


 


Balance -420 ml  -150 ml -320 ml  


 


      


 


Intake Oral 480 ml  600 ml 480 ml  


 


Output Urine Total 900 ml  750 ml 800 ml  


 


# Bowel Movements 1  0 0  








Result Diagram:  


5/25/18 1210                                                                   

             5/25/18 1210





Imaging





Last Impressions








Chest CT 5/26/18 0000 Signed





Impressions: 





 CONCLUSION:





 1.  Trace compressive/dependent atelectasis of both bases and tiny bilateral pl





 eural effusions. A slight degree of failure is conceivable. No pneumonic infilt





 rate demonstrated.





 2.  Mild emphysema and mild chronic interstitial changes are again noted. Stabl





 e, benign nodule in the right mid lung.





 3.  Stable diffuse prominent caliber of the thoracic aorta up to 4.1 cm.





  





 


 


Chest X-Ray 5/22/18 0753 Signed





Impressions: 





 CONCLUSION: No acute abnormality or significant interval change





  





 


 


Lung Scan-VQ Nuclear Medicine 5/22/18 0000 Signed





Impressions: 





 CONCLUSION: 





 1.  Low probability for PE.





 2.  Patchy uptake of tracer activity on the ventilation study suggestive of air





 space disease.





  





 


 


Lower Extremity Ultrasound 5/22/18 0000 Signed





Impressions: 





 CONCLUSION: 





 1.  No evidence of DVT.





  





 








Objective Remarks


awake and alert, no acute distress


anicteric


no nuchal rigidity


lungs no rales  , no wheezes on exam, no rhonchi 


regular rhythm


abdomen-soft good bowel sounds, nontender


extremities no edema


neuro exam- non focal





A/P


Problem List:  


(1) Sepsis


ICD Code:  A41.9 - Sepsis, unspecified organism


Status:  Acute


Assessment and Plan


67 years old male





Group B strep sepsis secondary to clinical Pneumonia-- sputum MRSA


- started on Ceftriaxone 2 gm daily 5/23


- Vancomycin added 5/24


- ID ff 


- repeat blood cultures - negative so far- ff


- no skin lesions/marks- denies history of IVDU- admits to smoking cocaine


- ff CBC- expect WBC to be high - on steroids


echo pending


- may need MAYI





Acute hypoxemic respiratory failure due to COPD exacerbation- improved 

clinically


- heavy smoker in the past 


- lungs- no wheezes, + rales left base


  keep on oxygen to keep O2 sat >90%- continue with neb treatment; scheduled 


  V/Q scan negative 


- change to po steroids- prednisone 20 mg po bid- taper off - decreased to once 

daily starting today 5/26


- continue  home inhalers


-


diabetes mellitus; uncontrolled-a1C 11.7


- expect to be high on steroids


 start on levemir  - increase dose to bid 20 


- medium sliding scale Novolog


-while here and accu-check with SSI. nd adjust- expect to be high on steroids(

at home was on lantus bid)


- continue to adjust





hypertension


History of AICD


- continue lisinopril- increase to 20 mg daily. Increae to 20 mg po bid  (now 

states he was on 40 mg po bid)


Clonidine prn





Acute kidney injury on top CKD- Improved


- off IVF


-ff creatinine- improved- will monitor off fluids- patient states good po intake





History of chronic pancreatitis-


 on creon and reglan daily





chronic back pain; resume home meds.


-DVT prophylaxis with subq heparin








d/w patient - up and increase activity





Problem Qualifiers





(1) Sepsis:  


Qualified Codes:  A41.9 - Sepsis, unspecified organism








Lucy Romero MD May 27, 2018 10:29

## 2018-05-28 VITALS
SYSTOLIC BLOOD PRESSURE: 130 MMHG | DIASTOLIC BLOOD PRESSURE: 81 MMHG | TEMPERATURE: 98.6 F | HEART RATE: 98 BPM | OXYGEN SATURATION: 98 % | RESPIRATION RATE: 16 BRPM

## 2018-05-28 VITALS
RESPIRATION RATE: 20 BRPM | HEART RATE: 78 BPM | TEMPERATURE: 98.6 F | OXYGEN SATURATION: 97 % | DIASTOLIC BLOOD PRESSURE: 88 MMHG | SYSTOLIC BLOOD PRESSURE: 132 MMHG

## 2018-05-28 VITALS — HEART RATE: 87 BPM

## 2018-05-28 VITALS
RESPIRATION RATE: 18 BRPM | SYSTOLIC BLOOD PRESSURE: 144 MMHG | DIASTOLIC BLOOD PRESSURE: 96 MMHG | HEART RATE: 65 BPM | TEMPERATURE: 98.2 F | OXYGEN SATURATION: 98 %

## 2018-05-28 VITALS — HEART RATE: 61 BPM

## 2018-05-28 VITALS
DIASTOLIC BLOOD PRESSURE: 72 MMHG | TEMPERATURE: 98.7 F | OXYGEN SATURATION: 97 % | HEART RATE: 89 BPM | SYSTOLIC BLOOD PRESSURE: 114 MMHG | RESPIRATION RATE: 16 BRPM

## 2018-05-28 VITALS
DIASTOLIC BLOOD PRESSURE: 81 MMHG | OXYGEN SATURATION: 96 % | RESPIRATION RATE: 18 BRPM | SYSTOLIC BLOOD PRESSURE: 122 MMHG | TEMPERATURE: 98.4 F | HEART RATE: 66 BPM

## 2018-05-28 VITALS
HEART RATE: 78 BPM | OXYGEN SATURATION: 96 % | RESPIRATION RATE: 20 BRPM | SYSTOLIC BLOOD PRESSURE: 115 MMHG | TEMPERATURE: 98.7 F | DIASTOLIC BLOOD PRESSURE: 84 MMHG

## 2018-05-28 VITALS — HEART RATE: 63 BPM

## 2018-05-28 VITALS — HEART RATE: 62 BPM

## 2018-05-28 VITALS — HEART RATE: 80 BPM

## 2018-05-28 VITALS — HEART RATE: 64 BPM

## 2018-05-28 VITALS — HEART RATE: 84 BPM

## 2018-05-28 VITALS — HEART RATE: 91 BPM

## 2018-05-28 VITALS — HEART RATE: 78 BPM

## 2018-05-28 VITALS — HEART RATE: 60 BPM

## 2018-05-28 VITALS — HEART RATE: 86 BPM

## 2018-05-28 VITALS — HEART RATE: 66 BPM

## 2018-05-28 VITALS — HEART RATE: 79 BPM

## 2018-05-28 VITALS
TEMPERATURE: 98 F | OXYGEN SATURATION: 98 % | DIASTOLIC BLOOD PRESSURE: 81 MMHG | RESPIRATION RATE: 16 BRPM | SYSTOLIC BLOOD PRESSURE: 110 MMHG | HEART RATE: 86 BPM

## 2018-05-28 VITALS — HEART RATE: 92 BPM

## 2018-05-28 VITALS — HEART RATE: 98 BPM

## 2018-05-28 VITALS — HEART RATE: 76 BPM

## 2018-05-28 VITALS — HEART RATE: 88 BPM

## 2018-05-28 VITALS — HEART RATE: 82 BPM

## 2018-05-28 VITALS — HEART RATE: 70 BPM

## 2018-05-28 RX ADMIN — CEFTRIAXONE SODIUM SCH MLS/HR: 2 INJECTION, POWDER, FOR SOLUTION INTRAMUSCULAR; INTRAVENOUS at 11:32

## 2018-05-28 RX ADMIN — SODIUM CHLORIDE SCH MLS/HR: 900 INJECTION INTRAVENOUS at 05:05

## 2018-05-28 RX ADMIN — HEPARIN SODIUM SCH UNITS: 10000 INJECTION, SOLUTION INTRAVENOUS; SUBCUTANEOUS at 21:13

## 2018-05-28 RX ADMIN — LISINOPRIL SCH MG: 20 TABLET ORAL at 09:00

## 2018-05-28 RX ADMIN — PANTOPRAZOLE SODIUM SCH MG: 20 TABLET, DELAYED RELEASE ORAL at 08:34

## 2018-05-28 RX ADMIN — HUMAN INSULIN SCH: 100 INJECTION, SOLUTION SUBCUTANEOUS at 17:00

## 2018-05-28 RX ADMIN — HUMAN INSULIN SCH: 100 INJECTION, SOLUTION SUBCUTANEOUS at 21:12

## 2018-05-28 RX ADMIN — METOCLOPRAMIDE HYDROCHLORIDE SCH MG: 10 TABLET ORAL at 08:36

## 2018-05-28 RX ADMIN — PANCRELIPASE SCH CAP: 60000; 12000; 38000 CAPSULE, DELAYED RELEASE PELLETS ORAL at 18:29

## 2018-05-28 RX ADMIN — TIOTROPIUM BROMIDE SCH MCG: 18 CAPSULE ORAL; RESPIRATORY (INHALATION) at 08:36

## 2018-05-28 RX ADMIN — SODIUM POLYSTYRENE SULFONATE SCH GM: 15 SUSPENSION ORAL; RECTAL at 11:32

## 2018-05-28 RX ADMIN — SODIUM POLYSTYRENE SULFONATE SCH GM: 15 SUSPENSION ORAL; RECTAL at 08:35

## 2018-05-28 RX ADMIN — HUMAN INSULIN SCH: 100 INJECTION, SOLUTION SUBCUTANEOUS at 08:00

## 2018-05-28 RX ADMIN — PANCRELIPASE SCH CAP: 60000; 12000; 38000 CAPSULE, DELAYED RELEASE PELLETS ORAL at 14:56

## 2018-05-28 RX ADMIN — ASPIRIN SCH MG: 81 TABLET ORAL at 08:34

## 2018-05-28 RX ADMIN — HEPARIN SODIUM SCH UNITS: 10000 INJECTION, SOLUTION INTRAVENOUS; SUBCUTANEOUS at 08:36

## 2018-05-28 RX ADMIN — ACYCLOVIR SCH UNITS: 800 TABLET ORAL at 21:12

## 2018-05-28 RX ADMIN — SERTRALINE HYDROCHLORIDE SCH MG: 50 TABLET, FILM COATED ORAL at 08:35

## 2018-05-28 RX ADMIN — HUMAN INSULIN SCH: 100 INJECTION, SOLUTION SUBCUTANEOUS at 11:48

## 2018-05-28 RX ADMIN — ACYCLOVIR SCH UNITS: 800 TABLET ORAL at 09:00

## 2018-05-28 RX ADMIN — PANCRELIPASE SCH CAP: 60000; 12000; 38000 CAPSULE, DELAYED RELEASE PELLETS ORAL at 08:34

## 2018-05-28 NOTE — HHI.PR
Subjective


Remarks


afebrile


minimal cough, no shortness of breath


no abdominal pain/chest pain complains





Objective


Vitals





Vital Signs








  Date Time  Temp Pulse Resp B/P (MAP) Pulse Ox O2 Delivery O2 Flow Rate FiO2


 


5/28/18 10:00   18     


 


5/28/18 06:00  62      


 


5/28/18 05:00  66      


 


5/28/18 04:00  63      


 


5/28/18 03:52      Room Air  


 


5/28/18 03:52 98.4 66 18 122/81 (95) 96   


 


5/28/18 03:00  64      


 


5/28/18 02:00  61      


 


5/28/18 01:00  60      


 


5/28/18 00:00 98.2 65 18 144/96 (112) 98   


 


5/28/18 00:00  65      


 


5/28/18 00:00      Room Air  


 


5/27/18 23:00  63      


 


5/27/18 22:00  69      


 


5/27/18 21:00  76      


 


5/27/18 20:00  80      


 


5/27/18 20:00 98.6 80 18 140/83 (102) 97   


 


5/27/18 20:00      Room Air  


 


5/27/18 18:00  86      


 


5/27/18 17:00  76      














I/O      


 


 5/27/18 5/27/18 5/27/18 5/28/18 5/28/18 5/28/18





 07:00 15:00 23:00 07:00 15:00 23:00


 


Intake Total 480 ml  800 ml 480 ml  


 


Output Total 800 ml  1470 ml 1300 ml  


 


Balance -320 ml  -670 ml -820 ml  


 


      


 


Intake Oral 480 ml  700 ml 480 ml  


 


IV Total   100 ml   


 


Output Urine Total 800 ml  1470 ml 1300 ml  


 


# Bowel Movements 0  1 0  








Result Diagram:  


5/25/18 1210                                                                   

             5/27/18 1936





Imaging





Last Impressions








Chest CT 5/26/18 0000 Signed





Impressions: 





 CONCLUSION:





 1.  Trace compressive/dependent atelectasis of both bases and tiny bilateral pl





 eural effusions. A slight degree of failure is conceivable. No pneumonic infilt





 rate demonstrated.





 2.  Mild emphysema and mild chronic interstitial changes are again noted. Stabl





 e, benign nodule in the right mid lung.





 3.  Stable diffuse prominent caliber of the thoracic aorta up to 4.1 cm.





  





 


 


Chest X-Ray 5/22/18 0753 Signed





Impressions: 





 CONCLUSION: No acute abnormality or significant interval change





  





 


 


Lung Scan-VQ Nuclear Medicine 5/22/18 0000 Signed





Impressions: 





 CONCLUSION: 





 1.  Low probability for PE.





 2.  Patchy uptake of tracer activity on the ventilation study suggestive of air





 space disease.





  





 


 


Lower Extremity Ultrasound 5/22/18 0000 Signed





Impressions: 





 CONCLUSION: 





 1.  No evidence of DVT.





  





 








Objective Remarks


awake and alert, no acute distress


anicteric


no nuchal rigidity


lungs no rales  , no wheezes on exam, no rhonchi 


regular rhythm


abdomen-soft good bowel sounds, nontender


extremities no edema


neuro exam- non focal





A/P


Problem List:  


(1) Sepsis


ICD Code:  A41.9 - Sepsis, unspecified organism


Status:  Acute


Assessment and Plan


67 years old male





Group B Stres sepsis secondary to clinical Pneumonia-- sputum MRSA


- started on Ceftriaxone 2 gm daily 5/23


- Vancomycin added 5/24


- ID ff 


- repeat blood cultures - negative so far- x 4 days


- no skin lesions/marks- denies history of IVDU- admits to smoking cocaine


- ff CBC- expect WBC to be high - on steroids


- echo  unremarkable 


- Cardiology consult for MAYI- per ID recommendations


- patient also has a PM placed in 2005- per patient he followed with Dr. Calzada





Acute hypoxemic respiratory failure due to COPD exacerbation- improved 

clinically


- heavy smoker in the past 


- lungs- no wheezes, + rales left base


  keep on oxygen to keep O2 sat >90%- continue with neb treatment; scheduled 


  V/Q scan negative 


- change to po steroids- prednisone  taper. DC prednisone today 5./28


- continue  home inhalers





hypertension


History of AICD


- on  lisinopril 20 mg po bid  states he was on 40 mg po bid as OP- -  will DC 

with elevated Potassium


- Clonidine prn


- consider restarting at a lower dose once K improved  or use CCB- amlodipine 








diabetes mellitus; uncontrolled-a1C 11.7


- expect to be high on steroids- completed course today 5/28


 started  on levemir  - increased dose to 24 untis  bid 24 - continue to adjust 

and monitor 


- medium sliding scale Novolog


- monitor - Prednisone- DC today 5/28


- reinforced diabetes education











Acute kidney injury on top CKD- Improved


- off IVF


- patient  with  good po intake





Hyperkalemia- on BMP today 5/28


- patient on Lisinorpil 20 mg po bid (home was on 40 mg po bid)


- HOld Lisinopril


- restart at a lower dose if K level improved  or switch to amlodipine for HTN  

if K is going to be an chronic problem


- given kayexalate 30 gm po x 2


- NS 50 cc/hr - 500 cc 


- BMP in am





History of chronic pancreatitis-


 on creon and reglan daily





chronic back pain; resume home meds.


-DVT prophylaxis with subq heparin








d/w patient - up and increase activity





Problem Qualifiers





(1) Sepsis:  


Qualified Codes:  A41.9 - Sepsis, unspecified organism








Lucy Romero MD May 28, 2018 16:29

## 2018-05-28 NOTE — HHI.IDPN
Subjective


Subjective


Remarks


pt has no fever


  c/o R pleuritic CP


CT w/o e/o PNA


2 D echo results w/o e/o endocarditis


Antibiotics


CFTX


Allergies:  


Coded Allergies:  


     No Known Drug Allergies (Verified  Allergy, Unknown, 5/22/18)


     MRI PRECAUTION (Verified  Adverse Reaction, Severe, PACEMAKER, 5/22/18)


 PT HAS PACEMAKER PER NURSE [facility]


     *MDRO Multi-Drug Resistant Organism (Verified  Adverse Reaction, Unknown, 

Cleared - 5/24/16, 5/22/18)


 MRSA PCR (nares) negative - 5/21/16 & 5/24/16


 ***Cleared per Infection Control***


 


 MRSA (blood and urine) - 2/2013





Objective


.





Vital Signs








  Date Time  Temp Pulse Resp B/P (MAP) Pulse Ox O2 Delivery O2 Flow Rate FiO2


 


5/28/18 10:00   18     


 


5/28/18 06:00  62      


 


5/28/18 05:00  66      


 


5/28/18 04:00  63      


 


5/28/18 03:52      Room Air  


 


5/28/18 03:52 98.4 66 18 122/81 (95) 96   


 


5/28/18 03:00  64      


 


5/28/18 02:00  61      


 


5/28/18 01:00  60      


 


5/28/18 00:00 98.2 65 18 144/96 (112) 98   


 


5/28/18 00:00  65      


 


5/28/18 00:00      Room Air  


 


5/27/18 23:00  63      


 


5/27/18 22:00  69      


 


5/27/18 21:00  76      


 


5/27/18 20:00  80      


 


5/27/18 20:00 98.6 80 18 140/83 (102) 97   


 


5/27/18 20:00      Room Air  


 


5/27/18 18:00  86      


 


5/27/18 17:00  76      


 


5/27/18 16:00  84      








.





Laboratory Tests








Test


  5/27/18


19:36


 


Blood Urea Nitrogen 18 MG/DL 


 


Creatinine 1.24 MG/DL 


 


Random Glucose 252 MG/DL 


 


Calcium Level 9.2 MG/DL 


 


Sodium Level 130 MEQ/L 


 


Potassium Level 5.8 MEQ/L 


 


Chloride Level 95 MEQ/L 


 


Carbon Dioxide Level 28.7 MEQ/L 


 


Anion Gap 6 MEQ/L 


 


Estimat Glomerular Filtration


Rate 70 ML/MIN 


 








Imaging


 


Last Impressions








Chest CT 5/26/18 0000 Signed





Impressions: 





 CONCLUSION:





 1.  Trace compressive/dependent atelectasis of both bases and tiny bilateral pl





 eural effusions. A slight degree of failure is conceivable. No pneumonic infilt





 rate demonstrated.





 2.  Mild emphysema and mild chronic interstitial changes are again noted. Stabl





 e, benign nodule in the right mid lung.





 3.  Stable diffuse prominent caliber of the thoracic aorta up to 4.1 cm.





  





 


 


Chest X-Ray 5/22/18 0753 Signed





Impressions: 





 CONCLUSION: No acute abnormality or significant interval change





  





 


 


Lung Scan-VQ Nuclear Medicine 5/22/18 0000 Signed





Impressions: 





 CONCLUSION: 





 1.  Low probability for PE.





 2.  Patchy uptake of tracer activity on the ventilation study suggestive of air





 space disease.





  





 


 


Lower Extremity Ultrasound 5/22/18 0000 Signed





Impressions: 





 CONCLUSION: 





 1.  No evidence of DVT.





  





 








Physical Exam


CONSTITUTIONAL/GENERAL: This is an adequately nourished patient, in no apparent 

distress.


TUBES/LINES/DRAINS:


SKIN: No jaundice, rashes, or lesions.   Skin temperature appropriate. Not 

diaphoretic. 


 CARDIOVASCULAR: Regular rate and rhythm without murmurs, gallops, or rubs. No 

JVD. Peripheral pulses symmetric.


Pacer in place L chest 


RESPIRATORY/CHEST: Symmetric, unlabored respirations. Clear to auscultation. 

Breath sounds equal bilaterally. No wheezes, rales, or rhonchi.  


GASTROINTESTINAL: Abdomen soft, non-tender, nondistended. No hepato-splenomegaly

, or palpable masses. No guarding. Bowel sounds present.


 MUSCULOSKELETAL: Extremities without clubbing, cyanosis, or edema. No joint 

tenderness or effusion noted. No calf tenderness. No mottling or clubbing.


 NEUROLOGICAL: Awake and alert. Motor and sensory grossly within normal limits. 

Follows commands. Clear speech Moves all extremities.


PSYCHIATRIC: No obvious anxiety/depression. no apparent hallucinations or other 

psychotic thought process.








Assessment & Plan


Remarks


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


   r/o pacer infection


No e/o  PNA clinically or radiologically


   - CT with fluid 


   - MRSA in sputum  cw colonisation





 cont CFTX 2 gm daily


dc vancomycin done


fu repeat BC untill final 


 cardiology consulted for  Katie Astorga MD May 28, 2018 15:15

## 2018-05-29 VITALS — OXYGEN SATURATION: 95 %

## 2018-05-29 VITALS
SYSTOLIC BLOOD PRESSURE: 121 MMHG | TEMPERATURE: 97.7 F | DIASTOLIC BLOOD PRESSURE: 82 MMHG | RESPIRATION RATE: 16 BRPM | OXYGEN SATURATION: 97 % | HEART RATE: 74 BPM

## 2018-05-29 VITALS — HEART RATE: 76 BPM

## 2018-05-29 VITALS — HEART RATE: 78 BPM

## 2018-05-29 VITALS
HEART RATE: 76 BPM | SYSTOLIC BLOOD PRESSURE: 122 MMHG | RESPIRATION RATE: 18 BRPM | DIASTOLIC BLOOD PRESSURE: 89 MMHG | OXYGEN SATURATION: 95 % | TEMPERATURE: 97.2 F

## 2018-05-29 VITALS — OXYGEN SATURATION: 96 %

## 2018-05-29 VITALS
RESPIRATION RATE: 22 BRPM | SYSTOLIC BLOOD PRESSURE: 134 MMHG | DIASTOLIC BLOOD PRESSURE: 87 MMHG | HEART RATE: 80 BPM | TEMPERATURE: 97.8 F | OXYGEN SATURATION: 93 %

## 2018-05-29 VITALS — HEART RATE: 82 BPM

## 2018-05-29 VITALS — HEART RATE: 72 BPM

## 2018-05-29 VITALS
OXYGEN SATURATION: 98 % | TEMPERATURE: 97.5 F | DIASTOLIC BLOOD PRESSURE: 95 MMHG | RESPIRATION RATE: 16 BRPM | HEART RATE: 73 BPM | SYSTOLIC BLOOD PRESSURE: 128 MMHG

## 2018-05-29 VITALS — HEART RATE: 96 BPM

## 2018-05-29 VITALS
OXYGEN SATURATION: 94 % | RESPIRATION RATE: 20 BRPM | DIASTOLIC BLOOD PRESSURE: 81 MMHG | TEMPERATURE: 98.4 F | HEART RATE: 84 BPM | SYSTOLIC BLOOD PRESSURE: 137 MMHG

## 2018-05-29 VITALS — HEART RATE: 90 BPM

## 2018-05-29 VITALS — HEART RATE: 83 BPM

## 2018-05-29 VITALS — HEART RATE: 88 BPM

## 2018-05-29 VITALS — HEART RATE: 80 BPM

## 2018-05-29 VITALS — HEART RATE: 86 BPM

## 2018-05-29 LAB
BUN SERPL-MCNC: 23 MG/DL (ref 7–18)
CALCIUM SERPL-MCNC: 8.6 MG/DL (ref 8.5–10.1)
CHLORIDE SERPL-SCNC: 95 MEQ/L (ref 98–107)
CREAT SERPL-MCNC: 1.16 MG/DL (ref 0.6–1.3)
GFR SERPLBLD BASED ON 1.73 SQ M-ARVRAT: 76 ML/MIN (ref 89–?)
GLUCOSE SERPL-MCNC: 120 MG/DL (ref 74–106)
HCO3 BLD-SCNC: 31.3 MEQ/L (ref 21–32)
SODIUM SERPL-SCNC: 136 MEQ/L (ref 136–145)

## 2018-05-29 RX ADMIN — HEPARIN SODIUM SCH UNITS: 10000 INJECTION, SOLUTION INTRAVENOUS; SUBCUTANEOUS at 20:28

## 2018-05-29 RX ADMIN — PANCRELIPASE SCH CAP: 60000; 12000; 38000 CAPSULE, DELAYED RELEASE PELLETS ORAL at 13:06

## 2018-05-29 RX ADMIN — ACYCLOVIR SCH UNITS: 800 TABLET ORAL at 09:34

## 2018-05-29 RX ADMIN — METOCLOPRAMIDE HYDROCHLORIDE SCH MG: 10 TABLET ORAL at 09:34

## 2018-05-29 RX ADMIN — HEPARIN SODIUM SCH UNITS: 10000 INJECTION, SOLUTION INTRAVENOUS; SUBCUTANEOUS at 09:35

## 2018-05-29 RX ADMIN — TIOTROPIUM BROMIDE SCH MCG: 18 CAPSULE ORAL; RESPIRATORY (INHALATION) at 09:35

## 2018-05-29 RX ADMIN — SERTRALINE HYDROCHLORIDE SCH MG: 50 TABLET, FILM COATED ORAL at 09:34

## 2018-05-29 RX ADMIN — Medication PRN ML: at 09:34

## 2018-05-29 RX ADMIN — ASPIRIN SCH MG: 81 TABLET ORAL at 09:34

## 2018-05-29 RX ADMIN — PANCRELIPASE SCH CAP: 60000; 12000; 38000 CAPSULE, DELAYED RELEASE PELLETS ORAL at 18:34

## 2018-05-29 RX ADMIN — HUMAN INSULIN SCH: 100 INJECTION, SOLUTION SUBCUTANEOUS at 17:00

## 2018-05-29 RX ADMIN — HUMAN INSULIN SCH: 100 INJECTION, SOLUTION SUBCUTANEOUS at 13:06

## 2018-05-29 RX ADMIN — PANCRELIPASE SCH CAP: 60000; 12000; 38000 CAPSULE, DELAYED RELEASE PELLETS ORAL at 09:34

## 2018-05-29 RX ADMIN — HUMAN INSULIN SCH: 100 INJECTION, SOLUTION SUBCUTANEOUS at 20:29

## 2018-05-29 RX ADMIN — HUMAN INSULIN SCH: 100 INJECTION, SOLUTION SUBCUTANEOUS at 09:33

## 2018-05-29 RX ADMIN — ACYCLOVIR SCH UNITS: 800 TABLET ORAL at 20:28

## 2018-05-29 RX ADMIN — PANTOPRAZOLE SODIUM SCH MG: 20 TABLET, DELAYED RELEASE ORAL at 09:34

## 2018-05-29 RX ADMIN — CEFTRIAXONE SODIUM SCH MLS/HR: 2 INJECTION, POWDER, FOR SOLUTION INTRAMUSCULAR; INTRAVENOUS at 09:36

## 2018-05-29 NOTE — HHI.PR
Subjective


Remarks


We will consult on call cardiology for MAYI since has not seen Dr. Calzada in a 

very long time and she states that he is not her patient


Await cardiac evaluation for MAYI


Discussed with patient and RN


Patient has already ate today so no procedures would be done until tomorrow at 

the earliest





Objective


Vitals





Vital Signs








  Date Time  Temp Pulse Resp B/P (MAP) Pulse Ox O2 Delivery O2 Flow Rate FiO2


 


5/29/18 07:15     96 Room Air  


 


5/29/18 07:15  96      


 


5/29/18 07:00 97.8 80 22 134/87 (103) 93   


 


5/29/18 06:00  76      


 


5/29/18 05:00  78      


 


5/29/18 04:56   20     


 


5/29/18 04:00  90      


 


5/29/18 03:48 98.4 84 20 137/81 (99) 94   


 


5/29/18 03:00  82      


 


5/29/18 02:00  76      


 


5/29/18 01:00  78      


 


5/29/18 00:00  83      


 


5/28/18 23:32 98.7 78 20 115/84 (94) 96   


 


5/28/18 23:32  78      


 


5/28/18 22:00  82      


 


5/28/18 21:00  86      


 


5/28/18 20:00 98.6 76 20 132/88 (103) 97   


 


5/28/18 20:00  78      


 


5/28/18 19:00      Room Air  


 


5/28/18 19:00  91      


 


5/28/18 18:00  78      


 


5/28/18 17:00  84      


 


5/28/18 16:00  84      


 


5/28/18 15:15 98.7 89 16 114/72 (86) 97   


 


5/28/18 15:00  87      


 


5/28/18 14:00  98      


 


5/28/18 13:00  98      


 


5/28/18 12:00  70      














I/O      


 


 5/28/18 5/28/18 5/28/18 5/29/18 5/29/18 5/29/18





 07:00 15:00 23:00 07:00 15:00 23:00


 


Intake Total 480 ml  1440 ml 680 ml  


 


Output Total 1300 ml  1200 ml 1450 ml  


 


Balance -820 ml  240 ml -770 ml  


 


      


 


Intake Oral 480 ml  1440 ml 680 ml  


 


Output Urine Total 1300 ml  1200 ml 1450 ml  


 


# Bowel Movements 0   0  








Result Diagram:  


5/25/18 1210                                                                   

             5/29/18 0519





Other Results





 Laboratory Tests








Test


  5/27/18


19:36 5/29/18


05:19


 


Blood Urea Nitrogen 18 MG/DL  23 MG/DL 


 


Creatinine 1.24 MG/DL  1.16 MG/DL 


 


Random Glucose 252 MG/DL  120 MG/DL 


 


Calcium Level 9.2 MG/DL  8.6 MG/DL 


 


Sodium Level 130 MEQ/L  136 MEQ/L 


 


Potassium Level 5.8 MEQ/L  3.9 MEQ/L 


 


Chloride Level 95 MEQ/L  95 MEQ/L 


 


Carbon Dioxide Level 28.7 MEQ/L  31.3 MEQ/L 


 


Anion Gap 6 MEQ/L  10 MEQ/L 


 


Estimat Glomerular Filtration


Rate 70 ML/MIN 


  76 ML/MIN 


 








Imaging





Last Impressions








Chest CT 5/26/18 0000 Signed





Impressions: 





 CONCLUSION:





 1.  Trace compressive/dependent atelectasis of both bases and tiny bilateral pl





 eural effusions. A slight degree of failure is conceivable. No pneumonic infilt





 rate demonstrated.





 2.  Mild emphysema and mild chronic interstitial changes are again noted. Stabl





 e, benign nodule in the right mid lung.





 3.  Stable diffuse prominent caliber of the thoracic aorta up to 4.1 cm.





  





 


 


Chest X-Ray 5/22/18 0753 Signed





Impressions: 





 CONCLUSION: No acute abnormality or significant interval change





  





 


 


Lung Scan-V Nuclear Medicine 5/22/18 0000 Signed





Impressions: 





 CONCLUSION: 





 1.  Low probability for PE.





 2.  Patchy uptake of tracer activity on the ventilation study suggestive of air





 space disease.





  





 


 


Lower Extremity Ultrasound 5/22/18 0000 Signed





Impressions: 





 CONCLUSION: 





 1.  No evidence of DVT.





  





 








Objective Remarks


GENERAL: Awake alert and oriented 3 talkative and cooperative


SKIN: Warm and dry.


HEAD: Atraumatic. Normocephalic. 


EYES: Pupils equal and round. No scleral icterus. No injection or drainage.  

Extraocular muscles intact


ENT: No nasal bleeding or discharge.  Mucous membranes pink and moist.  Tongue 

is midline


NECK: Trachea midline. No JVD.  Supple


CARDIOVASCULAR: Regular rate and rhythm.  S1-S2 no S3 or S4


RESPIRATORY: No accessory muscle use.  Few scattered rhonchi. Breath sounds 

equal bilaterally. 


GASTROINTESTINAL: Abdomen soft, non-tender, nondistended. Hepatic and splenic 

margins not palpable. 


MUSCULOSKELETAL: Extremities without clubbing, cyanosis, or edema. No obvious 

deformities. 


NEUROLOGICAL: Awake and alert. No obvious cranial nerve deficits.  Motor 

grossly within normal limits. Five out of 5 muscle strength in the arms and 

legs.  Normal speech.


PSYCHIATRIC: Appropriate mood and affect; insight and judgment normal.


Medications and IVs





Current Medications


Aspirin (Aspirin Chew) 162 mg ONCE  ONCE PO  Last administered on 5/22/18at 08:

09;  Start 5/22/18 at 08:00;  Stop 5/22/18 at 08:01;  Status DC


Sodium Chloride (NS Flush) 2 ml UNSCH  PRN IVF FLUSH AFTER USING IV ACCESS Last 

administered on 5/29/18at 09:34;  Start 5/22/18 at 08:00


Nitroglycerin (Nitrostat Sl) 0.4 mg Q5M SL  Last administered on 5/22/18at 08:29

;  Start 5/22/18 at 08:00;  Stop 5/22/18 at 08:11;  Status DC


Sodium Chloride 1,000 ml @  1,000 mls/hr Q1H IV  Last administered on 5/22/18at 

08:50;  Start 5/22/18 at 07:57;  Stop 5/22/18 at 08:56;  Status DC


Sodium Chloride 1,000 ml @  999 mls/hr BOLUS  ONCE IV  Last administered on 5/22 /18at 09:57;  Start 5/22/18 at 10:00;  Stop 5/22/18 at 11:00;  Status DC


Ceftriaxone Sodium 1000 mg/ Sodium Chloride 100 ml @  200 mls/hr ONCE  ONCE IV  

Last administered on 5/22/18at 09:56;  Start 5/22/18 at 10:00;  Stop 5/22/18 at 

10:29;  Status DC


Azithromycin 500 mg/Sodium Chloride 250 ml @  250 mls/hr ONCE  ONCE IV  Last 

administered on 5/22/18at 10:30;  Start 5/22/18 at 10:00;  Stop 5/22/18 at 10:59

;  Status DC


Sodium Chloride 250 ml @  250 mls/hr BOLUS  ONCE IV  Last administered on 5/22/ 18at 10:05;  Start 5/22/18 at 10:00;  Stop 5/22/18 at 10:59;  Status DC


Insulin Human Regular (NovoLIN R INJ) 8 units ONCE  ONCE SQ  Last administered 

on 5/22/18at 10:04;  Start 5/22/18 at 10:00;  Stop 5/22/18 at 10:03;  Status DC


Oxycodone HCl (Roxicodone) 15 mg Q8H  PRN PO PAIN 4-10;  Start 5/22/18 at 12:15

;  Stop 5/22/18 at 12:25;  Status DC


Dextrose (D50w (Vial) Inj) 50 ml UNSCH  PRN IV PUSH HYPOGLYCEMIA-SEE COMMENTS;  

Start 5/22/18 at 12:30;  Stop 5/25/18 at 08:36;  Status DC


Glucagon (Glucagon Inj) 1 mg UNSCH  PRN OTHER HYPOGLYCEMIA-SEE COMMENTS;  Start 

5/22/18 at 12:30;  Stop 5/25/18 at 08:36;  Status DC


Insulin Aspart (NovoLOG SUPPLEMENTAL SCALE) 1 ACHS SLIDING  SCALE SQ  Last 

administered on 5/23/18at 12:00;  Start 5/22/18 at 17:00;  Stop 5/23/18 at 13:46

;  Status DC


Aspirin (Ecotrin Ec) 81 mg DAILY PO  Last administered on 5/29/18at 09:34;  

Start 5/23/18 at 09:00


Lorazepam (Ativan) 0.5 mg Q8H  PRN PO ANXIETY AND/OR AGITATION Last 

administered on 5/29/18at 03:12;  Start 5/22/18 at 12:30


Metoclopramide HCl (Reglan) 10 mg DAILY PO  Last administered on 5/29/18at 09:34

;  Start 5/23/18 at 09:00


Amylase/Lipase/ Protease (Creon 12-38-60) 1 cap TIDPC PO  Last administered on 5 /29/18at 09:34;  Start 5/22/18 at 13:30


Pantoprazole Sodium (Protonix) 20 mg DAILY PO  Last administered on 5/29/18at 09

:34;  Start 5/23/18 at 09:00


Sertraline HCl (Zoloft) 50 mg DAILY PO  Last administered on 5/29/18at 09:34;  

Start 5/23/18 at 09:00


Oxycodone HCl (Roxicodone) 15 mg Q8H  PRN PO PAIN 4-10;  Start 5/22/18 at 12:30

;  Stop 5/22/18 at 12:30;  Status DC


Miscellaneous (Pill Splitter) 1 ea UNSCH  PRN OTHER SEE LABEL COMMENTS;  Start 5 /22/18 at 12:30


Oxycodone HCl (Roxicodone) 15 mg Q8H  PRN PO PAIN 4-10 Last administered on 5/29 /18at 03:56;  Start 5/22/18 at 12:30


Albuterol/ Ipratropium (Duoneb Neb) 1 ampule Q4HR  NEB NEB  Last administered 

on 5/26/18at 03:42;  Start 5/22/18 at 16:00;  Stop 5/26/18 at 15:59;  Status DC


Albuterol Sulfate (Albuterol Neb) 1.25 mg Q2HR NEB  PRN NEB SHORTNESS OF BREATH 

Last administered on 5/26/18at 21:16;  Start 5/22/18 at 12:45


Methylprednisolone Sodium Succinate (SoluMEDROL INJ) 40 mg Q8HR IV PUSH  Last 

administered on 5/23/18at 06:33;  Start 5/22/18 at 14:00;  Stop 5/23/18 at 10:16

;  Status DC


Insulin Detemir (Levemir Inj) 15 units HS SQ  Last administered on 5/22/18at 20:

33;  Start 5/22/18 at 21:00;  Stop 5/23/18 at 13:46;  Status DC


Heparin Sodium (Porcine) (Heparin Inj) 5,000 units Q12HR SQ  Last administered 

on 5/29/18at 09:35;  Start 5/22/18 at 21:00


Lisinopril (Prinivil) 10 mg DAILY PO  Last administered on 5/24/18at 08:53;  

Start 5/23/18 at 09:00;  Stop 5/25/18 at 08:07;  Status DC


Ceftriaxone Sodium 1000 mg/ Sodium Chloride 100 ml @  200 mls/hr Q24H IV  Last 

administered on 5/23/18at 09:45;  Start 5/23/18 at 09:00;  Stop 5/23/18 at 19:58

;  Status DC


Azithromycin 500 mg/Sodium Chloride 250 ml @  250 mls/hr Q24H IV  Last 

administered on 5/23/18at 12:01;  Start 5/23/18 at 10:00;  Stop 5/23/18 at 19:58

;  Status DC


Acetaminophen (Tylenol) 650 mg Q4H  PRN PO FEVER/PAIN 1-3;  Start 5/22/18 at 13:

30


Ondansetron HCl (Zofran  Odt) 4 mg Q8HR  PRN PO NAUSEA Last administered on 5/22 /18at 16:58;  Start 5/22/18 at 13:30


Guaifenesin (Robitussin Liq) 200 mg Q4H  PRN PO COUGH Last administered on 5/27/ 18at 20:27;  Start 5/22/18 at 18:30


Morphine Sulfate (Morphine Inj) 2 mg Q4H  PRN IV SEE LABEL COMMENTS Last 

administered on 5/22/18at 20:32;  Start 5/22/18 at 18:30;  Stop 5/23/18 at 10:23

;  Status DC


Vancomycin HCl 1000 mg/Sodium Chloride 250 ml @  250 mls/hr Q12H IV ;  Start 5/ 23/18 at 10:00;  Status UNV


Pharmacy Profile Note 0 ml @ 0 mls/hr UNSCH OTHER ;  Start 5/23/18 at 10:00;  

Stop 5/23/18 at 19:58;  Status DC


Prednisone (Deltasone) 20 mg BID PO  Last administered on 5/24/18at 08:53;  

Start 5/23/18 at 21:00;  Stop 5/24/18 at 13:03;  Status DC


Sodium Chloride 1,000 ml @  84 mls/hr [id number] IV  Last administered on 5/25/ 18at 17:35;  Start 5/23/18 at 11:00;  Stop 5/26/18 at 11:17;  Status DC


Vancomycin HCl 1250 mg/Sodium Chloride 262.5 ml @  250 mls/hr Q18H IV  Last 

administered on 5/23/18at 13:38;  Start 5/23/18 at 12:00;  Stop 5/23/18 at 19:58

;  Status DC


Morphine Sulfate (Morphine Inj) 0.5 mg Q4H  PRN IV BREAKTHROUGH PAIN;  Start 5/ 23/18 at 10:30


Melatonin (Melatonin) 5 mg HS  PRN PO sleep aid Last administered on 5/27/18at 

20:15;  Start 5/23/18 at 21:00


Miscellaneous Information ([facility] Ordered Lab Info) 1745SPECIFIC LAB TO 

BE DRAWN:VANCOMY... ONCE  ONCE .XX ;  Start 5/25/18 at 17:45;  Stop 5/25/18 at 

17:45;  Status DC


Insulin Detemir (Levemir Inj) 15 units BID SQ  Last administered on 5/24/18at 08

:53;  Start 5/23/18 at 21:00;  Stop 5/24/18 at 13:01;  Status DC


Dextrose (D50w (Vial) Inj) 50 ml UNSCH  PRN IV PUSH HYPOGLYCEMIA-SEE COMMENTS;  

Start 5/23/18 at 13:45


Glucagon (Glucagon Inj) 1 mg UNSCH  PRN OTHER HYPOGLYCEMIA-SEE COMMENTS;  Start 

5/23/18 at 13:45


Insulin Human Regular (NovoLIN R SUPPLEMENTAL SCALE) 1 ACHS SLIDING  SCALE SQ  

Last administered on 5/29/18at 09:33;  Start 5/23/18 at 17:00


Ceftriaxone Sodium 2000 mg/ Sodium Chloride 100 ml @  200 mls/hr Q24H IV ;  

Start 5/24/18 at 09:00;  Stop 5/24/18 at 09:07;  Status DC


Ceftriaxone Sodium 2000 mg/ Sodium Chloride 100 ml @  200 mls/hr Q24H IV  Last 

administered on 5/29/18at 09:36;  Start 5/24/18 at 10:00


Insulin Detemir (Levemir Inj) 20 units BID SQ  Last administered on 5/28/18at 09

:00;  Start 5/24/18 at 21:00;  Stop 5/28/18 at 17:17;  Status DC


Prednisone (Deltasone) 10 mg BID PO  Last administered on 5/26/18at 10:34;  

Start 5/24/18 at 21:00;  Stop 5/26/18 at 11:15;  Status DC


Pharmacy Profile Note 0 ml @ 0 mls/hr UNSCH OTHER ;  Start 5/24/18 at 14:15;  

Stop 5/28/18 at 11:24;  Status DC


Vancomycin HCl 1250 mg/Sodium Chloride 262.5 ml @  250 mls/hr Q12H IV  Last 

administered on 5/28/18at 05:05;  Start 5/24/18 at 17:00;  Stop 5/28/18 at 11:24

;  Status DC


Miscellaneous Information ([facility] Ordered Lab Info) SPECIFIC LAB TO BE 

DRAWN:VANCOMYCIN TROUGH DATE TO... ONCE  ONCE .XX  Last administered on 5/26/ 18at 05:00;  Start 5/26/18 at 04:45;  Stop 5/26/18 at 04:46;  Status DC


Tiotropium Bromide (Spiriva Inh) 18 mcg DAILY INH  Last administered on 5/29/ 18at 09:35;  Start 5/25/18 at 09:30


Clonidine (Catapres) 0.1 mg Q6H  PRN PO SBP>160, DBP>90;  Start 5/25/18 at 08:15


Lisinopril (Prinivil) 20 mg DAILY PO  Last administered on 5/27/18at 10:27;  

Start 5/25/18 at 09:00;  Stop 5/27/18 at 10:36;  Status DC


Prednisone (Deltasone) 10 mg DAILY PO  Last administered on 5/28/18at 08:34;  

Start 5/27/18 at 09:00;  Stop 5/28/18 at 17:03;  Status DC


Lisinopril (Prinivil) 20 mg BID PO  Last administered on 5/27/18at 20:15;  

Start 5/27/18 at 21:00;  Stop 5/28/18 at 16:31;  Status DC


Sodium Polystyrene Sulfonate (Kayexalate Liq) 15 gm QID PO  Last administered 

on 5/28/18at 11:32;  Start 5/28/18 at 09:00;  Stop 5/28/18 at 17:11;  Status DC


Lisinopril (Prinivil) 20 mg DAILY PO ;  Start 5/29/18 at 09:00;  Status Cancel


Sodium Chloride 500 ml @  50 mls/hr Q10H IV  Last administered on 5/28/18at 16:

45;  Start 5/28/18 at 16:45;  Stop 5/29/18 at 02:44;  Status DC


Insulin Detemir (Levemir Inj) 24 units BID SQ  Last administered on 5/29/18at 09

:34;  Start 5/28/18 at 21:00





A/P


Problem List:  


(1) Sepsis


ICD Code:  A41.9 - Sepsis, unspecified organism


Status:  Acute


Assessment and Plan


Assessment and Plan


67 years old male





Group B Stres sepsis secondary to clinical Pneumonia-- sputum MRSA


- started on Ceftriaxone 2 gm daily 5/23


- Vancomycin added 5/24


- ID ff 


- repeat blood cultures - negative so far- x 4 days


- no skin lesions/marks- denies history of IVDU- admits to smoking cocaine


- ff CBC- expect WBC to be high - on steroids


- echo  unremarkable 


- Cardiology consult for MAYI- per ID recommendations


- patient also has a PM placed in 2005- per patient has not followed with Dr. Calzada in a very long time we will consult on-call for MAYI





Acute hypoxemic respiratory failure due to COPD exacerbation- improved 

clinically


- heavy smoker in the past 


- lungs- no wheezes, + rales left base


  keep on oxygen to keep O2 sat >90%- continue with neb treatment; scheduled 


  V/Q scan negative 


- change to po steroids- prednisone  taper. DC prednisone today 5./28


- continue  home inhalers





hypertension


History of AICD


- on  lisinopril 20 mg po bid  states he was on 40 mg po bid as OP- -  will DC 

with elevated Potassium


- Clonidine prn


- consider restarting at a lower dose once K improved  or use CCB- amlodipine 








diabetes mellitus; uncontrolled-a1C 11.7


- expect to be high on steroids- completed course today 5/28


 started  on levemir  - increased dose to 24 untis  bid 24 - continue to adjust 

and monitor 


- medium sliding scale Novolog


- monitor - Prednisone- DC today 5/28


- reinforced diabetes education











Acute kidney injury on top CKD- Improved


- off IVF


- patient  with  good po intake





Hyperkalemia- on BMP today 5/28


- patient on Lisinorpil 20 mg po bid (home was on 40 mg po bid)


- HOld Lisinopril


- restart at a lower dose if K level improved  or switch to amlodipine for HTN  

if K is going to be an chronic problem


- given kayexalate 30 gm po x 2


- NS 50 cc/hr - 500 cc 


- BMP in am





History of chronic pancreatitis-


 on creon and reglan daily





chronic back pain; resume home meds.


-DVT prophylaxis with subq heparin








d/w patient - up and increase activity


Discharge Planning


Pending ID clearance and cardiac





Problem Qualifiers





(1) Sepsis:  


Qualified Codes:  A41.9 - Sepsis, unspecified organism








Ryan Funk DO May 29, 2018 11:38

## 2018-05-29 NOTE — MB
cc:

Prabhu Watkins DO

****

 

 

DATE:

05/29/2018

 

REASON FOR CONSULTATION:

Consideration of MAYI.

 

HISTORY OF PRESENT ILLNESS:

Martin Osuna is a pleasant 68-year-old male who previously saw Dr. Calzada a number of years ago, but has not been seen recently, and 

presented to [facility] on 05/22/2018 due to shortness of 

breath with a cough.  He states that the shortness of breath had been 

coming on a little more significant.  It did not seem to be associated

with anything.  He just felt like he could not catch his breath when 

he was taking a deep breath.  He would get a sharp, stabbing sensation

on his right side when he would take a deep breath.  Nothing seemed to

make it worse other than taking the deep breath.  Nothing seemed to 

make it better and so he presented to the hospital.  Troponins were 

checked and were negative.  He had an elevated white count and blood 

cultures were checked and he ended up with 4 aerobic bottles of Group 

B Beta Strep.  Infectious Disease asked that I see him for 

consideration of MAYI as he does have a history of a pacemaker with 

Group B Strep noted in his blood.  In seeing him, he states that he 

has no chest pain, shortness of breath, fevers or chills.

 

PAST MEDICAL HISTORY:

1.  Diabetes mellitus.

2.  Chronic obstructive pulmonary disease.

3.  Chronic kidney disease, stage III.

4.  Hypertension.

5.  Chronic pancreatitis.

6.  Chronic back pain.

7.  History of bradycardia.

 

PAST SURGICAL HISTORY:

1.  Biotronik Cylos pacemaker (serial #[id number], placed 11/24/2010 for

symptomatic bradycardia).

3.  Incision and drainage of metacarpophalangeal joint and repair of 

extensor tendon of right finger (05/17/2015).

4.  Cardiac catheterization (01/21/2016):  Left main normal, LAD 

normal, left circumflex and 3 obtuse marginals normal, RCA small but 

dominant with no significant disease.

5.  Lumbar spine surgery.

6.  Cholecystectomy.

 

ALLERGIES:

NO KNOWN DRUG ALLERGIES.

 

MEDICATIONS:

1.  DuoNeb every 4 hours as needed for shortness of breath.

2.  ProAir 2 puffs every 4-6 hours as needed for shortness of breath.

3.  Clonidine 0.05 mg daily.

4.  Cialis 5 mg daily.

5.  Lisinopril 20 mg b.i.d.

6.  Aspirin 81 mg daily.

7.  Roxicodone 15 mg every 8 hours as needed for pain.

8.  Zoloft 50 mg daily.

9.  Ativan 0.5 mg every 8 hours as needed for anxiety.

10.  Creon 1 cap t.i.d.

11.  Protonix 20 mg daily.

12.  Reglan 10 mg daily.

13.  Janumet 50/500 b.i.d.

14.  Bydureon 2 mg weekly.

FAMILY HISTORY:

Denies premature coronary artery disease or sudden cardiac death 

within the family.

 

SOCIAL HISTORY:

The patient is a previous smoker, but quit around 10 years ago.  

Denies alcohol abuse.  Originally denies drug abuse, but states that 

he did use cocaine at a party with a friend recently.  He snorts 

cocaine, but states that this was a rare occasion even though in his 

previous hospitalizations he has been positive for cocaine on each 

drug test.

 

REVIEW OF SYSTEMS:

Fourteen systems were reviewed including osteopathic pertinent 

positives and negatives above, otherwise negative.

 

PHYSICAL EXAMINATION:

VITAL SIGNS:  Temperature 97.2, heart rate 76, blood pressure 122/89, 

respirations 18, pulse oximetry 95% on room air.

GENERAL:  The patient appears well in no acute distress, alert, awake 

and oriented x3.

HEENT:  Extraocular muscles intact.  Mucous membranes moist.

NECK:  Supple.  No JVD at 45 degrees.  No carotid bruits heard 

bilaterally.  Carotid upstroke is brisk in nature.

HEART:  Regular rate and rhythm.  Positive first and second heart 

sounds with no noted murmurs, gallops or rubs.

LUNGS:  Clear to auscultation bilaterally.  No wheezes, rales or 

rhonchi.

ABDOMEN:  Soft, nontender, nondistended.  No organomegaly noted.

EXTREMITIES:  Show no clubbing, cyanosis or edema.  Femoral and distal

pulses are intact bilaterally.

NEUROLOGIC:  No focal deficits.

SKIN:  Warm, dry and intact.

OSTEOPATHIC:  No kyphoscoliosis, lordosis or paraspinal tender points.

 

LABORATORY DATA:

Hemoglobin 13.1, hematocrit 40.0, platelets 155.

Potassium 3.9, BUN 23, creatinine 1.16.

Troponin negative x2.

 

Electrocardiogram (05/22/2018 at 07:50):  Sinus tachycardia, possible 

left atrial enlargement, nonspecific ST-T wave changes.

 

ASSESSMENT:

1.  Group B Streptococcus bacteremia.

2.  Possible pneumonia with sputum positive for methicillin-resistant 

staphylococcus aureus.

3.  Hypoxemic respiratory failure.

4.  Hypertension.

5.  History of pacemaker placed for bradycardia.

6.  Atypical chest pain.

7.  Stable thoracic descending aneurysm of 4.1 cm.

8.  Cocaine abuse.

9.  Remote tobacco abuse.

 

RECOMMENDATIONS:

1.  Mr. Osuna appears to have presented with bacteremia with 4 

positive bottles of Group B Strep.  As this is higher risk for 

endocarditis, especially with his pacemaker, I agree with infectious 

disease that a MAYI should be done.

2.  We will plan to keep him n.p.o. after midnight and plan for a 

possible procedure tomorrow.

3.  He did present with chest pain, but overall, this was atypical in 

presentation, troponins have been negative and believed to be more 

towards due to his pneumonia.

4.  He has had a pacemaker placed for previous history of bradycardia.

 Unsure the last time this has been evaluated, but at this time shows 

no dysfunction and this can be followed up in the outpatient setting.

5.  Further recommendations will be made based on hospital course.

 

Thank you for allowing me to see Martin Osuna.  If there are any 

questions, please do not hesitate to call.

 

 

__________________________________

DO CATALINA Foster/TEETEE

D: 05/29/2018, 01:52 PM

T: 05/29/2018, 02:35 PM

Visit #: [id number]

Job #: [id number]

## 2018-05-30 VITALS
OXYGEN SATURATION: 96 % | RESPIRATION RATE: 16 BRPM | DIASTOLIC BLOOD PRESSURE: 73 MMHG | SYSTOLIC BLOOD PRESSURE: 126 MMHG | HEART RATE: 86 BPM | TEMPERATURE: 97.6 F

## 2018-05-30 VITALS — HEART RATE: 66 BPM

## 2018-05-30 VITALS
HEART RATE: 76 BPM | OXYGEN SATURATION: 98 % | DIASTOLIC BLOOD PRESSURE: 90 MMHG | RESPIRATION RATE: 16 BRPM | SYSTOLIC BLOOD PRESSURE: 128 MMHG | TEMPERATURE: 98 F

## 2018-05-30 VITALS
HEART RATE: 69 BPM | RESPIRATION RATE: 18 BRPM | OXYGEN SATURATION: 96 % | DIASTOLIC BLOOD PRESSURE: 84 MMHG | SYSTOLIC BLOOD PRESSURE: 126 MMHG | TEMPERATURE: 97.8 F

## 2018-05-30 VITALS
DIASTOLIC BLOOD PRESSURE: 90 MMHG | HEART RATE: 64 BPM | OXYGEN SATURATION: 98 % | RESPIRATION RATE: 18 BRPM | SYSTOLIC BLOOD PRESSURE: 136 MMHG | TEMPERATURE: 97.4 F

## 2018-05-30 VITALS
SYSTOLIC BLOOD PRESSURE: 123 MMHG | TEMPERATURE: 98.4 F | RESPIRATION RATE: 18 BRPM | OXYGEN SATURATION: 98 % | DIASTOLIC BLOOD PRESSURE: 79 MMHG | HEART RATE: 67 BPM

## 2018-05-30 VITALS — HEART RATE: 96 BPM

## 2018-05-30 VITALS — HEART RATE: 68 BPM

## 2018-05-30 VITALS — HEART RATE: 78 BPM

## 2018-05-30 VITALS
TEMPERATURE: 97.7 F | HEART RATE: 65 BPM | OXYGEN SATURATION: 98 % | SYSTOLIC BLOOD PRESSURE: 119 MMHG | DIASTOLIC BLOOD PRESSURE: 89 MMHG | RESPIRATION RATE: 18 BRPM

## 2018-05-30 VITALS — HEART RATE: 76 BPM

## 2018-05-30 VITALS — HEART RATE: 65 BPM

## 2018-05-30 VITALS — HEART RATE: 70 BPM

## 2018-05-30 VITALS — HEART RATE: 67 BPM

## 2018-05-30 VITALS
SYSTOLIC BLOOD PRESSURE: 139 MMHG | HEART RATE: 69 BPM | TEMPERATURE: 98.4 F | OXYGEN SATURATION: 97 % | RESPIRATION RATE: 18 BRPM | DIASTOLIC BLOOD PRESSURE: 89 MMHG

## 2018-05-30 VITALS — HEART RATE: 72 BPM

## 2018-05-30 VITALS — HEART RATE: 84 BPM

## 2018-05-30 VITALS — HEART RATE: 64 BPM

## 2018-05-30 VITALS — HEART RATE: 74 BPM

## 2018-05-30 VITALS — OXYGEN SATURATION: 97 %

## 2018-05-30 VITALS — HEART RATE: 71 BPM

## 2018-05-30 LAB
ALBUMIN SERPL-MCNC: 3.5 GM/DL (ref 3.4–5)
ALP SERPL-CCNC: 114 U/L (ref 45–117)
ALT SERPL-CCNC: 29 U/L (ref 12–78)
AST SERPL-CCNC: 24 U/L (ref 15–37)
BASOPHILS # BLD AUTO: 0 TH/MM3 (ref 0–0.2)
BASOPHILS NFR BLD: 0.4 % (ref 0–2)
BILIRUB SERPL-MCNC: 0.6 MG/DL (ref 0.2–1)
BUN SERPL-MCNC: 19 MG/DL (ref 7–18)
CALCIUM SERPL-MCNC: 9.5 MG/DL (ref 8.5–10.1)
CHLORIDE SERPL-SCNC: 93 MEQ/L (ref 98–107)
CREAT SERPL-MCNC: 1.12 MG/DL (ref 0.6–1.3)
EOSINOPHIL # BLD: 0.6 TH/MM3 (ref 0–0.4)
EOSINOPHIL NFR BLD: 6.2 % (ref 0–4)
ERYTHROCYTE [DISTWIDTH] IN BLOOD BY AUTOMATED COUNT: 13.5 % (ref 11.6–17.2)
GFR SERPLBLD BASED ON 1.73 SQ M-ARVRAT: 79 ML/MIN (ref 89–?)
GLUCOSE SERPL-MCNC: 77 MG/DL (ref 74–106)
HBA1C MFR BLD: 11.7 % (ref 4.3–6)
HCO3 BLD-SCNC: 30.7 MEQ/L (ref 21–32)
HCT VFR BLD CALC: 48.2 % (ref 39–51)
HGB BLD-MCNC: 16.2 GM/DL (ref 13–17)
LYMPHOCYTES # BLD AUTO: 3.1 TH/MM3 (ref 1–4.8)
LYMPHOCYTES NFR BLD AUTO: 30.6 % (ref 9–44)
MAGNESIUM SERPL-MCNC: 2 MG/DL (ref 1.5–2.5)
MCH RBC QN AUTO: 30.3 PG (ref 27–34)
MCHC RBC AUTO-ENTMCNC: 33.7 % (ref 32–36)
MCV RBC AUTO: 89.9 FL (ref 80–100)
MONOCYTE #: 1.5 TH/MM3 (ref 0–0.9)
MONOCYTES NFR BLD: 14.3 % (ref 0–8)
NEUTROPHILS # BLD AUTO: 4.9 TH/MM3 (ref 1.8–7.7)
NEUTROPHILS NFR BLD AUTO: 48.5 % (ref 16–70)
PHOSPHATE SERPL-MCNC: 3.6 MG/DL (ref 2.5–4.9)
PLATELET # BLD: 267 TH/MM3 (ref 150–450)
PMV BLD AUTO: 8.8 FL (ref 7–11)
PROT SERPL-MCNC: 8.2 GM/DL (ref 6.4–8.2)
RBC # BLD AUTO: 5.36 MIL/MM3 (ref 4.5–5.9)
SODIUM SERPL-SCNC: 134 MEQ/L (ref 136–145)
T4 FREE SERPL-MCNC: 1.4 NG/DL (ref 0.76–1.46)
WBC # BLD AUTO: 10.2 TH/MM3 (ref 4–11)

## 2018-05-30 RX ADMIN — HUMAN INSULIN SCH: 100 INJECTION, SOLUTION SUBCUTANEOUS at 12:00

## 2018-05-30 RX ADMIN — HUMAN INSULIN SCH: 100 INJECTION, SOLUTION SUBCUTANEOUS at 17:00

## 2018-05-30 RX ADMIN — HUMAN INSULIN SCH: 100 INJECTION, SOLUTION SUBCUTANEOUS at 08:00

## 2018-05-30 RX ADMIN — PANCRELIPASE SCH CAP: 60000; 12000; 38000 CAPSULE, DELAYED RELEASE PELLETS ORAL at 17:33

## 2018-05-30 RX ADMIN — ASPIRIN SCH MG: 81 TABLET ORAL at 11:50

## 2018-05-30 RX ADMIN — HEPARIN SODIUM SCH UNITS: 10000 INJECTION, SOLUTION INTRAVENOUS; SUBCUTANEOUS at 20:31

## 2018-05-30 RX ADMIN — ACYCLOVIR SCH UNITS: 800 TABLET ORAL at 09:00

## 2018-05-30 RX ADMIN — SERTRALINE HYDROCHLORIDE SCH MG: 50 TABLET, FILM COATED ORAL at 11:50

## 2018-05-30 RX ADMIN — HUMAN INSULIN SCH: 100 INJECTION, SOLUTION SUBCUTANEOUS at 20:34

## 2018-05-30 RX ADMIN — CEFTRIAXONE SODIUM SCH MLS/HR: 2 INJECTION, POWDER, FOR SOLUTION INTRAMUSCULAR; INTRAVENOUS at 11:55

## 2018-05-30 RX ADMIN — PANCRELIPASE SCH CAP: 60000; 12000; 38000 CAPSULE, DELAYED RELEASE PELLETS ORAL at 11:49

## 2018-05-30 RX ADMIN — METOCLOPRAMIDE HYDROCHLORIDE SCH MG: 10 TABLET ORAL at 11:50

## 2018-05-30 RX ADMIN — PANTOPRAZOLE SODIUM SCH MG: 20 TABLET, DELAYED RELEASE ORAL at 11:50

## 2018-05-30 RX ADMIN — HEPARIN SODIUM SCH UNITS: 10000 INJECTION, SOLUTION INTRAVENOUS; SUBCUTANEOUS at 11:50

## 2018-05-30 RX ADMIN — TIOTROPIUM BROMIDE SCH MCG: 18 CAPSULE ORAL; RESPIRATORY (INHALATION) at 11:49

## 2018-05-30 RX ADMIN — ACYCLOVIR SCH UNITS: 800 TABLET ORAL at 20:31

## 2018-05-30 RX ADMIN — PANCRELIPASE SCH CAP: 60000; 12000; 38000 CAPSULE, DELAYED RELEASE PELLETS ORAL at 15:26

## 2018-05-30 NOTE — HHI.IDPN
Subjective


Subjective


Remarks


afebrile


MAYI showed small vegetation on aortic valve


Antibiotics


CFTX


Allergies:  


Coded Allergies:  


     No Known Drug Allergies (Verified  Allergy, Unknown, 5/22/18)


     MRI PRECAUTION (Verified  Adverse Reaction, Severe, PACEMAKER, 5/22/18)


 PT HAS PACEMAKER PER NURSE SALIMA


     *MDRO Multi-Drug Resistant Organism (Verified  Adverse Reaction, Unknown, 

Cleared - 5/24/16, 5/22/18)


 MRSA PCR (nares) negative - 5/21/16 & 5/24/16


 ***Cleared per Infection Control***


 


 MRSA (blood and urine) - 2/2013





Objective


.





Vital Signs








  Date Time  Temp Pulse Resp B/P (MAP) Pulse Ox O2 Delivery O2 Flow Rate FiO2


 


5/30/18 18:00  68      


 


5/30/18 17:00  68      


 


5/30/18 16:00  74      


 


5/30/18 15:15 98.0 76 16 128/90 (103) 98   


 


5/30/18 15:00  65      


 


5/30/18 14:00  96      


 


5/30/18 13:00  72      


 


5/30/18 12:28 97.8 69 18 126/84 (98) 96   


 


5/30/18 12:00  66      


 


5/30/18 11:00  67      


 


5/30/18 08:50 97.6 86 16 126/73 (90) 96   


 


5/30/18 08:50     96 Room Air  


 


5/30/18 08:00  84      


 


5/30/18 07:00  65      


 


5/30/18 06:00  76      


 


5/30/18 05:00  78      


 


5/30/18 04:01  65      


 


5/30/18 04:00 97.7 65 18 119/89 (99) 98   


 


5/30/18 03:00  67      


 


5/30/18 02:00  70      


 


5/30/18 01:00  76      


 


5/30/18 00:23 97.4 64 18 136/90 (105) 98   


 


5/30/18 00:11  64      


 


5/29/18 22:00  76      


 


5/29/18 21:27     96   


 


5/29/18 21:00  72      


 


5/29/18 20:27 97.7 74 16 121/82 (95) 97   


 


5/29/18 20:25  76      


 


5/29/18 20:00     96 Room Air  














 5/30/18 5/30/18 5/31/18





 15:00 23:00 07:00


 


Intake Total  860 ml 


 


Output Total  1200 ml 


 


Balance  -340 ml 


 


   


 


Intake Oral  860 ml 


 


Output Urine Total  1200 ml 








.





Laboratory Tests








Test


  5/30/18


05:07


 


White Blood Count 10.2 TH/MM3 


 


Red Blood Count 5.36 MIL/MM3 


 


Hemoglobin 16.2 GM/DL 


 


Hematocrit 48.2 % 


 


Mean Corpuscular Volume 89.9 FL 


 


Mean Corpuscular Hemoglobin 30.3 PG 


 


Mean Corpuscular Hemoglobin


Concent 33.7 % 


 


 


Red Cell Distribution Width 13.5 % 


 


Platelet Count 267 TH/MM3 


 


Mean Platelet Volume 8.8 FL 


 


Neutrophils (%) (Auto) 48.5 % 


 


Lymphocytes (%) (Auto) 30.6 % 


 


Monocytes (%) (Auto) 14.3 % 


 


Eosinophils (%) (Auto) 6.2 % 


 


Basophils (%) (Auto) 0.4 % 


 


Neutrophils # (Auto) 4.9 TH/MM3 


 


Lymphocytes # (Auto) 3.1 TH/MM3 


 


Monocytes # (Auto) 1.5 TH/MM3 


 


Eosinophils # (Auto) 0.6 TH/MM3 


 


Basophils # (Auto) 0.0 TH/MM3 


 


CBC Comment DIFF FINAL 


 


Differential Comment  








Laboratory Tests








Test


  5/29/18


05:19 5/30/18


05:01


 


Blood Urea Nitrogen 23 MG/DL  19 MG/DL 


 


Creatinine 1.16 MG/DL  1.12 MG/DL 


 


Random Glucose 120 MG/DL  77 MG/DL 


 


Calcium Level 8.6 MG/DL  9.5 MG/DL 


 


Sodium Level 136 MEQ/L  134 MEQ/L 


 


Potassium Level 3.9 MEQ/L  4.1 MEQ/L 


 


Chloride Level 95 MEQ/L  93 MEQ/L 


 


Carbon Dioxide Level 31.3 MEQ/L  30.7 MEQ/L 


 


Anion Gap 10 MEQ/L  10 MEQ/L 


 


Estimat Glomerular Filtration


Rate 76 ML/MIN 


  79 ML/MIN 


 


 


Total Protein  8.2 GM/DL 


 


Albumin  3.5 GM/DL 


 


Phosphorus Level  3.6 MG/DL 


 


Magnesium Level  2.0 MG/DL 


 


Alkaline Phosphatase  114 U/L 


 


Aspartate Amino Transf


(AST/SGOT) 


  24 U/L 


 


 


Alanine Aminotransferase


(ALT/SGPT) 


  29 U/L 


 


 


Total Bilirubin  0.6 MG/DL 


 


Free Thyroxine  1.40 NG/DL 


 


Thyroid Stimulating Hormone


3rd Gen 


  3.260 uIU/ML 


 








Imaging


  


Last Impressions








Chest CT 5/26/18 0000 Signed





Impressions: 





 CONCLUSION:





 1.  Trace compressive/dependent atelectasis of both bases and tiny bilateral pl





 eural effusions. A slight degree of failure is conceivable. No pneumonic infilt





 rate demonstrated.





 2.  Mild emphysema and mild chronic interstitial changes are again noted. Stabl





 e, benign nodule in the right mid lung.





 3.  Stable diffuse prominent caliber of the thoracic aorta up to 4.1 cm.





  





 


 


Chest X-Ray 5/22/18 0753 Signed





Impressions: 





 CONCLUSION: No acute abnormality or significant interval change





  





 


 


Lung Scan-VQ Nuclear Medicine 5/22/18 0000 Signed





Impressions: 





 CONCLUSION: 





 1.  Low probability for PE.





 2.  Patchy uptake of tracer activity on the ventilation study suggestive of air





 space disease.





  





 


 


Lower Extremity Ultrasound 5/22/18 0000 Signed





Impressions: 





 CONCLUSION: 





 1.  No evidence of DVT.





  





 








Physical Exam


CONSTITUTIONAL/GENERAL: This is an adequately nourished patient, in no apparent 

distress.


TUBES/LINES/DRAINS:


SKIN: No jaundice, rashes, or lesions.   Skin temperature appropriate. Not 

diaphoretic. 


 CARDIOVASCULAR: Regular rate and rhythm without murmurs, gallops, or rubs. No 

JVD. Peripheral pulses symmetric.


Pacer in place L chest 


RESPIRATORY/CHEST: Symmetric, unlabored respirations. Clear to auscultation. 

Breath sounds equal bilaterally. No wheezes, rales, or rhonchi.  


GASTROINTESTINAL: Abdomen soft, non-tender, nondistended. No hepato-splenomegaly

, or palpable masses. No guarding. Bowel sounds present.


 MUSCULOSKELETAL: Extremities without clubbing, cyanosis, or edema. No joint 

tenderness or effusion noted. No calf tenderness. No mottling or clubbing.


 NEUROLOGICAL: Awake and alert. Motor and sensory grossly within normal limits. 

Follows commands. Clear speech Moves all extremities.


PSYCHIATRIC: No obvious anxiety/depression. no apparent hallucinations or other 

psychotic thought process.








Assessment & Plan


Remarks


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


Aortic valve endocarditis, GBS


   - PCN LEILANI 0.06


No e/o  PNA clinically or radiologically


   - CT with fluid 


   - MRSA in sputum  cw colonisation





 cont CFTX 2 gm daily x 6 weeks


consult EP for pacer removal











Katie Medina MD May 30, 2018 19:07

## 2018-05-30 NOTE — HHI.PR
Subjective


Remarks


We will consult on call cardiology for MAYI since has not seen Dr. Calzada in a 

very long time and she states that he is not her patient


Await cardiac evaluation for MAYI


Discussed with patient and RN


Patient has already ate today so no procedures would be done until tomorrow at 

the earliest








5-30 HAD MAYI 


POSITIVE FOR ENDOCARDITIS


WILL NEED ANTIBIOTICS AND WILL NEED PACEMAKER REMOVED PER DISCUSSION WITH ID 

AND CARDIOLOGY


DW RN AND PT AND CM


ANTIBIOTICS PER ID





Objective


Vitals





Vital Signs








  Date Time  Temp Pulse Resp B/P (MAP) Pulse Ox O2 Delivery O2 Flow Rate FiO2


 


5/30/18 12:28 97.8 69 18 126/84 (98) 96   


 


5/30/18 08:50 97.6 86 16 126/73 (90) 96   


 


5/30/18 08:50     96 Room Air  


 


5/30/18 08:00  84      


 


5/30/18 07:00  65      


 


5/30/18 06:00  76      


 


5/30/18 05:00  78      


 


5/30/18 04:01  65      


 


5/30/18 04:00 97.7 65 18 119/89 (99) 98   


 


5/30/18 03:00  67      


 


5/30/18 02:00  70      


 


5/30/18 01:00  76      


 


5/30/18 00:23 97.4 64 18 136/90 (105) 98   


 


5/30/18 00:11  64      


 


5/29/18 22:00  76      


 


5/29/18 21:27     96   


 


5/29/18 21:00  72      


 


5/29/18 20:27 97.7 74 16 121/82 (95) 97   


 


5/29/18 20:25  76      


 


5/29/18 20:00     96 Room Air  


 


5/29/18 19:00  76      


 


5/29/18 18:00  78      


 


5/29/18 17:00  78      


 


5/29/18 16:00  76      














I/O      


 


 5/29/18 5/29/18 5/29/18 5/30/18 5/30/18 5/30/18





 07:00 15:00 23:00 07:00 15:00 23:00


 


Intake Total 680 ml 100 ml 700 ml 240 ml  


 


Output Total 1450 ml  1720 ml 850 ml  


 


Balance -770 ml 100 ml -1020 ml -610 ml  


 


      


 


Intake Oral 680 ml  700 ml 240 ml  


 


IV Total  100 ml    


 


Output Urine Total 1450 ml  1720 ml 850 ml  


 


# Bowel Movements 0     








Result Diagram:  


5/30/18 0507                                                                   

             5/30/18 0501





Other Results





 Laboratory Tests








Test


  5/27/18


19:36 5/29/18


05:19 5/30/18


05:01 5/30/18


05:07


 


Blood Urea Nitrogen 18 MG/DL  23 MG/DL  19 MG/DL  


 


Creatinine 1.24 MG/DL  1.16 MG/DL  1.12 MG/DL  


 


Random Glucose 252 MG/DL  120 MG/DL  77 MG/DL  


 


Calcium Level 9.2 MG/DL  8.6 MG/DL  9.5 MG/DL  


 


Sodium Level 130 MEQ/L  136 MEQ/L  134 MEQ/L  


 


Potassium Level 5.8 MEQ/L  3.9 MEQ/L  4.1 MEQ/L  


 


Chloride Level 95 MEQ/L  95 MEQ/L  93 MEQ/L  


 


Carbon Dioxide Level 28.7 MEQ/L  31.3 MEQ/L  30.7 MEQ/L  


 


Anion Gap 6 MEQ/L  10 MEQ/L  10 MEQ/L  


 


Estimat Glomerular Filtration


Rate 70 ML/MIN 


  76 ML/MIN 


  79 ML/MIN 


  


 


 


Total Protein   8.2 GM/DL  


 


Albumin   3.5 GM/DL  


 


Phosphorus Level   3.6 MG/DL  


 


Magnesium Level   2.0 MG/DL  


 


Alkaline Phosphatase   114 U/L  


 


Aspartate Amino Transf


(AST/SGOT) 


  


  24 U/L 


  


 


 


Alanine Aminotransferase


(ALT/SGPT) 


  


  29 U/L 


  


 


 


Total Bilirubin   0.6 MG/DL  


 


Free Thyroxine   1.40 NG/DL  


 


Thyroid Stimulating Hormone


3rd Gen 


  


  3.260 uIU/ML 


  


 


 


White Blood Count    10.2 TH/MM3 


 


Red Blood Count    5.36 MIL/MM3 


 


Hemoglobin    16.2 GM/DL 


 


Hematocrit    48.2 % 


 


Mean Corpuscular Volume    89.9 FL 


 


Mean Corpuscular Hemoglobin    30.3 PG 


 


Mean Corpuscular Hemoglobin


Concent 


  


  


  33.7 % 


 


 


Red Cell Distribution Width    13.5 % 


 


Platelet Count    267 TH/MM3 


 


Mean Platelet Volume    8.8 FL 


 


Neutrophils (%) (Auto)    48.5 % 


 


Lymphocytes (%) (Auto)    30.6 % 


 


Monocytes (%) (Auto)    14.3 % 


 


Eosinophils (%) (Auto)    6.2 % 


 


Basophils (%) (Auto)    0.4 % 


 


Neutrophils # (Auto)    4.9 TH/MM3 


 


Lymphocytes # (Auto)    3.1 TH/MM3 


 


Monocytes # (Auto)    1.5 TH/MM3 


 


Eosinophils # (Auto)    0.6 TH/MM3 


 


Basophils # (Auto)    0.0 TH/MM3 


 


CBC Comment    DIFF FINAL 


 


Differential Comment     








Imaging





Last Impressions








Chest CT 5/26/18 0000 Signed





Impressions: 





 CONCLUSION:





 1.  Trace compressive/dependent atelectasis of both bases and tiny bilateral pl





 eural effusions. A slight degree of failure is conceivable. No pneumonic infilt





 rate demonstrated.





 2.  Mild emphysema and mild chronic interstitial changes are again noted. Stabl





 e, benign nodule in the right mid lung.





 3.  Stable diffuse prominent caliber of the thoracic aorta up to 4.1 cm.





  





 


 


Chest X-Ray 5/22/18 0753 Signed





Impressions: 





 CONCLUSION: No acute abnormality or significant interval change





  





 


 


Lung Scan-VQ Nuclear Medicine 5/22/18 0000 Signed





Impressions: 





 CONCLUSION: 





 1.  Low probability for PE.





 2.  Patchy uptake of tracer activity on the ventilation study suggestive of air





 space disease.





  





 


 


Lower Extremity Ultrasound 5/22/18 0000 Signed





Impressions: 





 CONCLUSION: 





 1.  No evidence of DVT.





  





 








Objective Remarks


GENERAL: Awake alert and oriented 3 talkative and cooperative


SKIN: Warm and dry.


HEAD: Atraumatic. Normocephalic. 


EYES: Pupils equal and round. No scleral icterus. No injection or drainage.  

Extraocular muscles intact


ENT: No nasal bleeding or discharge.  Mucous membranes pink and moist.  Tongue 

is midline


NECK: Trachea midline. No JVD.  Supple


CARDIOVASCULAR: Regular rate and rhythm.  S1-S2 no S3 or S4


RESPIRATORY: No accessory muscle use.  Few scattered rhonchi. Breath sounds 

equal bilaterally. 


GASTROINTESTINAL: Abdomen soft, non-tender, nondistended. Hepatic and splenic 

margins not palpable. 


MUSCULOSKELETAL: Extremities without clubbing, cyanosis, or edema. No obvious 

deformities. 


NEUROLOGICAL: Awake and alert. No obvious cranial nerve deficits.  Motor 

grossly within normal limits. Five out of 5 muscle strength in the arms and 

legs.  Normal speech.


PSYCHIATRIC: Appropriate mood and affect; insight and judgment normal.


Procedures


MAYI 5-30 SHOWED ENDOCARDITIS


Medications and IVs





Current Medications


Aspirin (Aspirin Chew) 162 mg ONCE  ONCE PO  Last administered on 5/22/18at 08:

09;  Start 5/22/18 at 08:00;  Stop 5/22/18 at 08:01;  Status DC


Sodium Chloride (NS Flush) 2 ml UNSCH  PRN IVF FLUSH AFTER USING IV ACCESS Last 

administered on 5/29/18at 09:34;  Start 5/22/18 at 08:00


Nitroglycerin (Nitrostat Sl) 0.4 mg Q5M SL  Last administered on 5/22/18at 08:29

;  Start 5/22/18 at 08:00;  Stop 5/22/18 at 08:11;  Status DC


Sodium Chloride 1,000 ml @  1,000 mls/hr Q1H IV  Last administered on 5/22/18at 

08:50;  Start 5/22/18 at 07:57;  Stop 5/22/18 at 08:56;  Status DC


Sodium Chloride 1,000 ml @  999 mls/hr BOLUS  ONCE IV  Last administered on 5/22 /18at 09:57;  Start 5/22/18 at 10:00;  Stop 5/22/18 at 11:00;  Status DC


Ceftriaxone Sodium 1000 mg/ Sodium Chloride 100 ml @  200 mls/hr ONCE  ONCE IV  

Last administered on 5/22/18at 09:56;  Start 5/22/18 at 10:00;  Stop 5/22/18 at 

10:29;  Status DC


Azithromycin 500 mg/Sodium Chloride 250 ml @  250 mls/hr ONCE  ONCE IV  Last 

administered on 5/22/18at 10:30;  Start 5/22/18 at 10:00;  Stop 5/22/18 at 10:59

;  Status DC


Sodium Chloride 250 ml @  250 mls/hr BOLUS  ONCE IV  Last administered on 5/22/ 18at 10:05;  Start 5/22/18 at 10:00;  Stop 5/22/18 at 10:59;  Status DC


Insulin Human Regular (NovoLIN R INJ) 8 units ONCE  ONCE SQ  Last administered 

on 5/22/18at 10:04;  Start 5/22/18 at 10:00;  Stop 5/22/18 at 10:03;  Status DC


Oxycodone HCl (Roxicodone) 15 mg Q8H  PRN PO PAIN 4-10;  Start 5/22/18 at 12:15

;  Stop 5/22/18 at 12:25;  Status DC


Dextrose (D50w (Vial) Inj) 50 ml UNSCH  PRN IV PUSH HYPOGLYCEMIA-SEE COMMENTS;  

Start 5/22/18 at 12:30;  Stop 5/25/18 at 08:36;  Status DC


Glucagon (Glucagon Inj) 1 mg UNSCH  PRN OTHER HYPOGLYCEMIA-SEE COMMENTS;  Start 

5/22/18 at 12:30;  Stop 5/25/18 at 08:36;  Status DC


Insulin Aspart (NovoLOG SUPPLEMENTAL SCALE) 1 ACHS SLIDING  SCALE SQ  Last 

administered on 5/23/18at 12:00;  Start 5/22/18 at 17:00;  Stop 5/23/18 at 13:46

;  Status DC


Aspirin (Ecotrin Ec) 81 mg DAILY PO  Last administered on 5/30/18at 11:50;  

Start 5/23/18 at 09:00


Lorazepam (Ativan) 0.5 mg Q8H  PRN PO ANXIETY AND/OR AGITATION Last 

administered on 5/29/18at 03:12;  Start 5/22/18 at 12:30


Metoclopramide HCl (Reglan) 10 mg DAILY PO  Last administered on 5/30/18at 11:50

;  Start 5/23/18 at 09:00


Amylase/Lipase/ Protease (Creon 12-38-60) 1 cap TIDPC PO  Last administered on 5 /30/18at 15:26;  Start 5/22/18 at 13:30


Pantoprazole Sodium (Protonix) 20 mg DAILY PO  Last administered on 5/30/18at 11

:50;  Start 5/23/18 at 09:00


Sertraline HCl (Zoloft) 50 mg DAILY PO  Last administered on 5/30/18at 11:50;  

Start 5/23/18 at 09:00


Oxycodone HCl (Roxicodone) 15 mg Q8H  PRN PO PAIN 4-10;  Start 5/22/18 at 12:30

;  Stop 5/22/18 at 12:30;  Status DC


Miscellaneous (Pill Splitter) 1 ea UNSCH  PRN OTHER SEE LABEL COMMENTS;  Start 5 /22/18 at 12:30


Oxycodone HCl (Roxicodone) 15 mg Q8H  PRN PO PAIN 4-10 Last administered on 5/29 /18at 20:40;  Start 5/22/18 at 12:30


Albuterol/ Ipratropium (Duoneb Neb) 1 ampule Q4HR  NEB NEB  Last administered 

on 5/26/18at 03:42;  Start 5/22/18 at 16:00;  Stop 5/26/18 at 15:59;  Status DC


Albuterol Sulfate (Albuterol Neb) 1.25 mg Q2HR NEB  PRN NEB SHORTNESS OF BREATH 

Last administered on 5/26/18at 21:16;  Start 5/22/18 at 12:45


Methylprednisolone Sodium Succinate (SoluMEDROL INJ) 40 mg Q8HR IV PUSH  Last 

administered on 5/23/18at 06:33;  Start 5/22/18 at 14:00;  Stop 5/23/18 at 10:16

;  Status DC


Insulin Detemir (Levemir Inj) 15 units HS SQ  Last administered on 5/22/18at 20:

33;  Start 5/22/18 at 21:00;  Stop 5/23/18 at 13:46;  Status DC


Heparin Sodium (Porcine) (Heparin Inj) 5,000 units Q12HR SQ  Last administered 

on 5/30/18at 11:50;  Start 5/22/18 at 21:00


Lisinopril (Prinivil) 10 mg DAILY PO  Last administered on 5/24/18at 08:53;  

Start 5/23/18 at 09:00;  Stop 5/25/18 at 08:07;  Status DC


Ceftriaxone Sodium 1000 mg/ Sodium Chloride 100 ml @  200 mls/hr Q24H IV  Last 

administered on 5/23/18at 09:45;  Start 5/23/18 at 09:00;  Stop 5/23/18 at 19:58

;  Status DC


Azithromycin 500 mg/Sodium Chloride 250 ml @  250 mls/hr Q24H IV  Last 

administered on 5/23/18at 12:01;  Start 5/23/18 at 10:00;  Stop 5/23/18 at 19:58

;  Status DC


Acetaminophen (Tylenol) 650 mg Q4H  PRN PO FEVER/PAIN 1-3;  Start 5/22/18 at 13:

30


Ondansetron HCl (Zofran  Odt) 4 mg Q8HR  PRN PO NAUSEA Last administered on 5/22 /18at 16:58;  Start 5/22/18 at 13:30


Guaifenesin (Robitussin Liq) 200 mg Q4H  PRN PO COUGH Last administered on 5/27/ 18at 20:27;  Start 5/22/18 at 18:30


Morphine Sulfate (Morphine Inj) 2 mg Q4H  PRN IV SEE LABEL COMMENTS Last 

administered on 5/22/18at 20:32;  Start 5/22/18 at 18:30;  Stop 5/23/18 at 10:23

;  Status DC


Vancomycin HCl 1000 mg/Sodium Chloride 250 ml @  250 mls/hr Q12H IV ;  Start 5/ 23/18 at 10:00;  Status UNV


Pharmacy Profile Note 0 ml @ 0 mls/hr UNSCH OTHER ;  Start 5/23/18 at 10:00;  

Stop 5/23/18 at 19:58;  Status DC


Prednisone (Deltasone) 20 mg BID PO  Last administered on 5/24/18at 08:53;  

Start 5/23/18 at 21:00;  Stop 5/24/18 at 13:03;  Status DC


Sodium Chloride 1,000 ml @  84 mls/hr [id number] IV  Last administered on 5/25/ 18at 17:35;  Start 5/23/18 at 11:00;  Stop 5/26/18 at 11:17;  Status DC


Vancomycin HCl 1250 mg/Sodium Chloride 262.5 ml @  250 mls/hr Q18H IV  Last 

administered on 5/23/18at 13:38;  Start 5/23/18 at 12:00;  Stop 5/23/18 at 19:58

;  Status DC


Morphine Sulfate (Morphine Inj) 0.5 mg Q4H  PRN IV BREAKTHROUGH PAIN;  Start 5/ 23/18 at 10:30


Melatonin (Melatonin) 5 mg HS  PRN PO sleep aid Last administered on 5/27/18at 

20:15;  Start 5/23/18 at 21:00


Miscellaneous Information ([facility] Ordered Lab Info) [id number] LAB TO 

BE DRAWN:VANCOMY... ONCE  ONCE .XX ;  Start 5/25/18 at 17:45;  Stop 5/25/18 at 

17:45;  Status DC


Insulin Detemir (Levemir Inj) 15 units BID SQ  Last administered on 5/24/18at 08

:53;  Start 5/23/18 at 21:00;  Stop 5/24/18 at 13:01;  Status DC


Dextrose (D50w (Vial) Inj) 50 ml UNSCH  PRN IV PUSH HYPOGLYCEMIA-SEE COMMENTS;  

Start 5/23/18 at 13:45


Glucagon (Glucagon Inj) 1 mg UNSCH  PRN OTHER HYPOGLYCEMIA-SEE COMMENTS;  Start 

5/23/18 at 13:45


Insulin Human Regular (NovoLIN R SUPPLEMENTAL SCALE) 1 ACHS SLIDING  SCALE SQ  

Last administered on 5/29/18at 20:29;  Start 5/23/18 at 17:00


Ceftriaxone Sodium 2000 mg/ Sodium Chloride 100 ml @  200 mls/hr Q24H IV ;  

Start 5/24/18 at 09:00;  Stop 5/24/18 at 09:07;  Status DC


Ceftriaxone Sodium 2000 mg/ Sodium Chloride 100 ml @  200 mls/hr Q24H IV  Last 

administered on 5/30/18at 11:55;  Start 5/24/18 at 10:00


Insulin Detemir (Levemir Inj) 20 units BID SQ  Last administered on 5/28/18at 09

:00;  Start 5/24/18 at 21:00;  Stop 5/28/18 at 17:17;  Status DC


Prednisone (Deltasone) 10 mg BID PO  Last administered on 5/26/18at 10:34;  

Start 5/24/18 at 21:00;  Stop 5/26/18 at 11:15;  Status DC


Pharmacy Profile Note 0 ml @ 0 mls/hr UNSCH OTHER ;  Start 5/24/18 at 14:15;  

Stop 5/28/18 at 11:24;  Status DC


Vancomycin HCl 1250 mg/Sodium Chloride 262.5 ml @  250 mls/hr Q12H IV  Last 

administered on 5/28/18at 05:05;  Start 5/24/18 at 17:00;  Stop 5/28/18 at 11:24

;  Status DC


Miscellaneous Information ([facility] Ordered Lab Info) SPECIFIC LAB TO BE 

DRAWN:VANCOMYCIN TROUGH DATE TO... ONCE  ONCE .XX  Last administered on 5/26/ 18at 05:00;  Start 5/26/18 at 04:45;  Stop 5/26/18 at 04:46;  Status DC


Tiotropium Bromide (Spiriva Inh) 18 mcg DAILY INH  Last administered on 5/30/ 18at 11:49;  Start 5/25/18 at 09:30


Clonidine (Catapres) 0.1 mg Q6H  PRN PO SBP>160, DBP>90;  Start 5/25/18 at 08:15


Lisinopril (Prinivil) 20 mg DAILY PO  Last administered on 5/27/18at 10:27;  

Start 5/25/18 at 09:00;  Stop 5/27/18 at 10:36;  Status DC


Prednisone (Deltasone) 10 mg DAILY PO  Last administered on 5/28/18at 08:34;  

Start 5/27/18 at 09:00;  Stop 5/28/18 at 17:03;  Status DC


Lisinopril (Prinivil) 20 mg BID PO  Last administered on 5/27/18at 20:15;  

Start 5/27/18 at 21:00;  Stop 5/28/18 at 16:31;  Status DC


Sodium Polystyrene Sulfonate (Kayexalate Liq) 15 gm QID PO  Last administered 

on 5/28/18at 11:32;  Start 5/28/18 at 09:00;  Stop 5/28/18 at 17:11;  Status DC


Lisinopril (Prinivil) 20 mg DAILY PO ;  Start 5/29/18 at 09:00;  Status Cancel


Sodium Chloride 500 ml @  50 mls/hr Q10H IV  Last administered on 5/28/18at 16:

45;  Start 5/28/18 at 16:45;  Stop 5/29/18 at 02:44;  Status DC


Insulin Detemir (Levemir Inj) 24 units BID SQ  Last administered on 5/29/18at 20

:28;  Start 5/28/18 at 21:00





A/P


Problem List:  


(1) Sepsis


ICD Code:  A41.9 - Sepsis, unspecified organism


Status:  Acute


Assessment and Plan


Assessment and Plan


67 years old male





Group B Strep sepsis secondary to clinical Pneumonia-- sputum MRSA


- started on Ceftriaxone 2 gm daily 5/23


- Vancomycin added 5/24


- ID ff 


- repeat blood cultures - negative so far- x 4 days


- no skin lesions/marks- denies history of IVDU- admits to smoking cocaine


- ff CBC- expect WBC to be high - on steroids


- echo  unremarkable 


- Cardiology consult for MAYI- per ID recommendations


- patient also has a PM placed in 2005- per patient has not followed with Dr. Calzada in a very long time we will consult on-call for MAYI


-MAYI POSITIVE FOR ENDOCARDITIS-- NEEDS PACEMAKER/AICD REMOVED AND ANTIBIOTICS





Acute hypoxemic respiratory failure due to COPD exacerbation- improved 

clinically


- heavy smoker in the past 


- lungs- no wheezes, + rales left base


  keep on oxygen to keep O2 sat >90%- continue with neb treatment; scheduled 


  V/Q scan negative 


- change to po steroids- prednisone  taper. DC prednisone today 5./28


- continue  home inhalers





hypertension


History of AICD


- on  lisinopril 20 mg po bid  states he was on 40 mg po bid as OP- -  will DC 

with elevated Potassium


- Clonidine prn


- consider restarting at a lower dose once K improved  or use CCB- amlodipine 


NEEDS AICD/PACEMAKER REMOVED








diabetes mellitus; uncontrolled-a1C 11.7


- expect to be high on steroids- completed course today 5/28


 started  on levemir  - increased dose to 24 untis  bid 24 - continue to adjust 

and monitor 


- medium sliding scale Novolog


- monitor - Prednisone- DC today 5/28


- reinforced diabetes education











Acute kidney injury on top CKD- Improved


- off IVF


- patient  with  good po intake





Hyperkalemia- on BMP today 5/28


- patient on Lisinorpil 20 mg po bid (home was on 40 mg po bid)


- HOld Lisinopril


- restart at a lower dose if K level improved  or switch to amlodipine for HTN  

if K is going to be an chronic problem


- given kayexalate 30 gm po x 2


- NS 50 cc/hr - 500 cc 


- BMP in am





History of chronic pancreatitis-


 on creon and reglan daily





chronic back pain; resume home meds.


-DVT prophylaxis with subq heparin








d/w patient - up and increase activity


Discharge Planning


Pending ID clearance and cardiac


WILL NEED PACER/AICD REMOVED DUE TO MRSA AND ENDOCARDITIS





Problem Qualifiers





(1) Sepsis:  


Qualified Codes:  A41.9 - Sepsis, unspecified organism








Ryan Funk DO May 30, 2018 15:46

## 2018-05-30 NOTE — PD.CARD.PN
Subjective


Subjective Remarks


No events overnight





MAYI showing very small vegetation noted on aortic valve





Objective


Medications





Current Medications








 Medications


  (Trade)  Dose


 Ordered  Sig/Jean Carlos


 Route  Start Time


 Stop Time Status Last Admin


 


  (NS Flush)  2 ml  UNSCH  PRN


 IVF  5/22/18 08:00


    5/29/18 09:34


 


 


  (Ecotrin Ec)  81 mg  DAILY


 PO  5/23/18 09:00


    5/30/18 11:50


 


 


  (Ativan)  0.5 mg  Q8H  PRN


 PO  5/22/18 12:30


    5/29/18 03:12


 


 


  (Reglan)  10 mg  DAILY


 PO  5/23/18 09:00


    5/30/18 11:50


 


 


  (Creon 12-38-60)  1 cap  TIDPC


 PO  5/22/18 13:30


    5/30/18 15:26


 


 


  (Protonix)  20 mg  DAILY


 PO  5/23/18 09:00


    5/30/18 11:50


 


 


  (Zoloft)  50 mg  DAILY


 PO  5/23/18 09:00


    5/30/18 11:50


 


 


  (Pill Splitter)  1 ea  UNSCH  PRN


 OTHER  5/22/18 12:30


     


 


 


  (Roxicodone)  15 mg  Q8H  PRN


 PO  5/22/18 12:30


    5/29/18 20:40


 


 


  (Albuterol Neb)  1.25 mg  Q2HR NEB  PRN


 NEB  5/22/18 12:45


    5/26/18 21:16


 


 


  (Heparin Inj)  5,000 units  Q12HR


 SQ  5/22/18 21:00


    5/30/18 11:50


 


 


  (Tylenol)  650 mg  Q4H  PRN


 PO  5/22/18 13:30


     


 


 


  (Zofran  Odt)  4 mg  Q8HR  PRN


 PO  5/22/18 13:30


    5/22/18 16:58


 


 


  (Robitussin Liq)  200 mg  Q4H  PRN


 PO  5/22/18 18:30


    5/27/18 20:27


 


 


  (Morphine Inj)  0.5 mg  Q4H  PRN


 IV  5/23/18 10:30


     


 


 


  (Melatonin)  5 mg  HS  PRN


 PO  5/23/18 21:00


    5/27/18 20:15


 


 


  (D50w (Vial) Inj)  50 ml  UNSCH  PRN


 IV PUSH  5/23/18 13:45


     


 


 


  (Glucagon Inj)  1 mg  UNSCH  PRN


 OTHER  5/23/18 13:45


     


 


 


  (NovoLIN R


 SUPPLEMENTAL


 SCALE)  1  ACHS SLIDING  SCALE


 SQ  5/23/18 17:00


    5/29/18 20:29


 


 


 Ceftriaxone


 Sodium 2000 mg/


 Sodium Chloride  100 ml @ 


 200 mls/hr  Q24H


 IV  5/24/18 10:00


    5/30/18 11:55


 


 


  (Spiriva Inh)  18 mcg  DAILY


 INH  5/25/18 09:30


    5/30/18 11:49


 


 


  (Catapres)  0.1 mg  Q6H  PRN


 PO  5/25/18 08:15


     


 


 


  (Levemir Inj)  24 units  BID


 SQ  5/28/18 21:00


    5/29/18 20:28


 








Vital Signs / I&O





Vital Signs








  Date Time  Temp Pulse Resp B/P (MAP) Pulse Ox O2 Delivery O2 Flow Rate FiO2


 


5/30/18 12:28 97.8 69 18 126/84 (98) 96   


 


5/30/18 08:50 97.6 86 16 126/73 (90) 96   


 


5/30/18 08:50     96 Room Air  


 


5/30/18 08:00  84      


 


5/30/18 07:00  65      


 


5/30/18 06:00  76      


 


5/30/18 05:00  78      


 


5/30/18 04:01  65      


 


5/30/18 04:00 97.7 65 18 119/89 (99) 98   


 


5/30/18 03:00  67      


 


5/30/18 02:00  70      


 


5/30/18 01:00  76      


 


5/30/18 00:23 97.4 64 18 136/90 (105) 98   


 


5/30/18 00:11  64      


 


5/29/18 22:00  76      


 


5/29/18 21:27     96   


 


5/29/18 21:00  72      


 


5/29/18 20:27 97.7 74 16 121/82 (95) 97   


 


5/29/18 20:25  76      


 


5/29/18 20:00     96 Room Air  


 


5/29/18 19:00  76      


 


5/29/18 18:00  78      


 


5/29/18 17:00  78      


 


5/29/18 16:00  76      














I/O      


 


 5/29/18 5/29/18 5/29/18 5/30/18 5/30/18 5/30/18





 07:00 15:00 23:00 07:00 15:00 23:00


 


Intake Total 680 ml 100 ml 700 ml 240 ml  


 


Output Total 1450 ml  1720 ml 850 ml  


 


Balance -770 ml 100 ml -1020 ml -610 ml  


 


      


 


Intake Oral 680 ml  700 ml 240 ml  


 


IV Total  100 ml    


 


Output Urine Total 1450 ml  1720 ml 850 ml  


 


# Bowel Movements 0     








Physical Exam


GENERAL: NAD, AAOx3


SKIN: Warm and dry.


HEAD: Atraumatic. Normocephalic. 


EYES: Pupils equal and round. No scleral icterus. No injection or drainage. 


ENT: No nasal bleeding or discharge.  Mucous membranes pink and moist.


NECK: Trachea midline. No JVD. 


CARDIOVASCULAR: Regular rate and rhythm.  


RESPIRATORY: No accessory muscle use. Clear to auscultation. Breath sounds 

equal bilaterally. 


GASTROINTESTINAL: Abdomen soft, non-tender, nondistended. Hepatic and splenic 

margins not palpable. 


MUSCULOSKELETAL: Extremities without clubbing, cyanosis, or edema. No obvious 

deformities. 


NEUROLOGICAL: Awake and alert. No obvious cranial nerve deficits.  Motor 

grossly within normal limits. Five out of 5 muscle strength in the arms and 

legs.  Normal speech.


PSYCHIATRIC: Appropriate mood and affect; insight and judgment normal.


Laboratory





Laboratory Tests








Test


  5/30/18


05:01 5/30/18


05:07


 


Blood Urea Nitrogen 19 MG/DL  


 


Creatinine 1.12 MG/DL  


 


Random Glucose 77 MG/DL  


 


Total Protein 8.2 GM/DL  


 


Albumin 3.5 GM/DL  


 


Calcium Level 9.5 MG/DL  


 


Phosphorus Level 3.6 MG/DL  


 


Magnesium Level 2.0 MG/DL  


 


Alkaline Phosphatase 114 U/L  


 


Aspartate Amino Transf


(AST/SGOT) 24 U/L 


  


 


 


Alanine Aminotransferase


(ALT/SGPT) 29 U/L 


  


 


 


Total Bilirubin 0.6 MG/DL  


 


Sodium Level 134 MEQ/L  


 


Potassium Level 4.1 MEQ/L  


 


Chloride Level 93 MEQ/L  


 


Carbon Dioxide Level 30.7 MEQ/L  


 


Anion Gap 10 MEQ/L  


 


Estimat Glomerular Filtration


Rate 79 ML/MIN 


  


 


 


Free Thyroxine 1.40 NG/DL  


 


Thyroid Stimulating Hormone


3rd Gen 3.260 uIU/ML 


  


 


 


White Blood Count  10.2 TH/MM3 


 


Red Blood Count  5.36 MIL/MM3 


 


Hemoglobin  16.2 GM/DL 


 


Hematocrit  48.2 % 


 


Mean Corpuscular Volume  89.9 FL 


 


Mean Corpuscular Hemoglobin  30.3 PG 


 


Mean Corpuscular Hemoglobin


Concent 


  33.7 % 


 


 


Red Cell Distribution Width  13.5 % 


 


Platelet Count  267 TH/MM3 


 


Mean Platelet Volume  8.8 FL 


 


Neutrophils (%) (Auto)  48.5 % 


 


Lymphocytes (%) (Auto)  30.6 % 


 


Monocytes (%) (Auto)  14.3 % 


 


Eosinophils (%) (Auto)  6.2 % 


 


Basophils (%) (Auto)  0.4 % 


 


Neutrophils # (Auto)  4.9 TH/MM3 


 


Lymphocytes # (Auto)  3.1 TH/MM3 


 


Monocytes # (Auto)  1.5 TH/MM3 


 


Eosinophils # (Auto)  0.6 TH/MM3 


 


Basophils # (Auto)  0.0 TH/MM3 


 


CBC Comment  DIFF FINAL 


 


Differential Comment   











Assessment and Plan


Problem List:  


(1) Sepsis


ICD Codes:  A41.9 - Sepsis, unspecified organism


Status:  Acute


(2) Polysubstance abuse


ICD Codes:  F19.10 - Other psychoactive substance abuse, uncomplicated


Status:  Acute


(3) HTN (hypertension)


ICD Codes:  I10 - Hypertension


Status:  Chronic


(4) DKA (diabetic ketoacidoses)


ICD Codes:  E13.10 - Other specified diabetes mellitus with ketoacidosis 

without coma


Status:  Acute


(5) COPD (chronic obstructive pulmonary disease)


ICD Codes:  J44.9 - Chronic obstructive pulmonary disease


Status:  Chronic


(6) Cocaine abuse


ICD Codes:  F14.10 - Cocaine abuse


Status:  Acute


(7) Pacemaker


ICD Codes:  Z95.0 - Presence of cardiac pacemaker


Status:  Chronic


Assessment and Plan


1) Bacteremia with Group B Strep


   MAYI showing very small vegetation noted on aortic valve


   No other vegetations noted


2) May need to have PPM removed, will discuss with ID/EP


3) Atypical CP


   Trops negative


   Possible due to his PNA


4) Cocaine cessation





Problem Qualifiers





(1) Sepsis:  


Qualified Codes:  A41.9 - Sepsis, unspecified organism








Prabhu Watkins DO May 30, 2018 16:02

## 2018-05-31 VITALS — HEART RATE: 66 BPM

## 2018-05-31 VITALS — HEART RATE: 64 BPM

## 2018-05-31 VITALS
TEMPERATURE: 97.8 F | OXYGEN SATURATION: 97 % | RESPIRATION RATE: 15 BRPM | DIASTOLIC BLOOD PRESSURE: 97 MMHG | HEART RATE: 77 BPM | SYSTOLIC BLOOD PRESSURE: 149 MMHG

## 2018-05-31 VITALS
OXYGEN SATURATION: 96 % | RESPIRATION RATE: 20 BRPM | SYSTOLIC BLOOD PRESSURE: 143 MMHG | DIASTOLIC BLOOD PRESSURE: 92 MMHG | HEART RATE: 78 BPM | TEMPERATURE: 98.1 F

## 2018-05-31 VITALS — HEART RATE: 72 BPM

## 2018-05-31 VITALS
TEMPERATURE: 97.6 F | OXYGEN SATURATION: 93 % | RESPIRATION RATE: 14 BRPM | SYSTOLIC BLOOD PRESSURE: 129 MMHG | HEART RATE: 83 BPM | DIASTOLIC BLOOD PRESSURE: 85 MMHG

## 2018-05-31 VITALS — HEART RATE: 68 BPM

## 2018-05-31 VITALS
TEMPERATURE: 97.2 F | RESPIRATION RATE: 14 BRPM | SYSTOLIC BLOOD PRESSURE: 131 MMHG | OXYGEN SATURATION: 95 % | HEART RATE: 66 BPM | DIASTOLIC BLOOD PRESSURE: 82 MMHG

## 2018-05-31 VITALS
TEMPERATURE: 98 F | OXYGEN SATURATION: 97 % | RESPIRATION RATE: 16 BRPM | SYSTOLIC BLOOD PRESSURE: 142 MMHG | HEART RATE: 74 BPM | DIASTOLIC BLOOD PRESSURE: 68 MMHG

## 2018-05-31 VITALS — HEART RATE: 82 BPM

## 2018-05-31 VITALS — HEART RATE: 76 BPM

## 2018-05-31 VITALS — HEART RATE: 71 BPM

## 2018-05-31 VITALS — OXYGEN SATURATION: 97 %

## 2018-05-31 VITALS — HEART RATE: 70 BPM

## 2018-05-31 VITALS — HEART RATE: 69 BPM

## 2018-05-31 VITALS — HEART RATE: 65 BPM

## 2018-05-31 VITALS — HEART RATE: 62 BPM

## 2018-05-31 VITALS — HEART RATE: 78 BPM

## 2018-05-31 VITALS — HEART RATE: 67 BPM

## 2018-05-31 VITALS — HEART RATE: 74 BPM

## 2018-05-31 VITALS — HEART RATE: 80 BPM

## 2018-05-31 LAB
ALBUMIN SERPL-MCNC: 3.1 GM/DL (ref 3.4–5)
ALP SERPL-CCNC: 124 U/L (ref 45–117)
ALT SERPL-CCNC: 30 U/L (ref 12–78)
AST SERPL-CCNC: 23 U/L (ref 15–37)
BASOPHILS # BLD AUTO: 0 TH/MM3 (ref 0–0.2)
BASOPHILS NFR BLD: 0.5 % (ref 0–2)
BILIRUB SERPL-MCNC: 0.5 MG/DL (ref 0.2–1)
BUN SERPL-MCNC: 20 MG/DL (ref 7–18)
CALCIUM SERPL-MCNC: 9.1 MG/DL (ref 8.5–10.1)
CHLORIDE SERPL-SCNC: 96 MEQ/L (ref 98–107)
CREAT SERPL-MCNC: 0.98 MG/DL (ref 0.6–1.3)
EOSINOPHIL # BLD: 0.4 TH/MM3 (ref 0–0.4)
EOSINOPHIL NFR BLD: 5.1 % (ref 0–4)
ERYTHROCYTE [DISTWIDTH] IN BLOOD BY AUTOMATED COUNT: 13.4 % (ref 11.6–17.2)
GFR SERPLBLD BASED ON 1.73 SQ M-ARVRAT: 92 ML/MIN (ref 89–?)
GLUCOSE SERPL-MCNC: 69 MG/DL (ref 74–106)
HCO3 BLD-SCNC: 26.5 MEQ/L (ref 21–32)
HCT VFR BLD CALC: 45.3 % (ref 39–51)
HGB BLD-MCNC: 15.3 GM/DL (ref 13–17)
INR PPP: 1 RATIO
LYMPHOCYTES # BLD AUTO: 1.6 TH/MM3 (ref 1–4.8)
LYMPHOCYTES NFR BLD AUTO: 21.5 % (ref 9–44)
MAGNESIUM SERPL-MCNC: 2 MG/DL (ref 1.5–2.5)
MCH RBC QN AUTO: 30.4 PG (ref 27–34)
MCHC RBC AUTO-ENTMCNC: 33.8 % (ref 32–36)
MCV RBC AUTO: 90 FL (ref 80–100)
MONOCYTE #: 0.8 TH/MM3 (ref 0–0.9)
MONOCYTES NFR BLD: 11.7 % (ref 0–8)
NEUTROPHILS # BLD AUTO: 4.4 TH/MM3 (ref 1.8–7.7)
NEUTROPHILS NFR BLD AUTO: 61.2 % (ref 16–70)
PHOSPHATE SERPL-MCNC: 3.6 MG/DL (ref 2.5–4.9)
PLATELET # BLD: 242 TH/MM3 (ref 150–450)
PMV BLD AUTO: 8.9 FL (ref 7–11)
PROT SERPL-MCNC: 7.5 GM/DL (ref 6.4–8.2)
PROTHROMBIN TIME: 10.2 SEC (ref 9.8–11.6)
RBC # BLD AUTO: 5.04 MIL/MM3 (ref 4.5–5.9)
SODIUM SERPL-SCNC: 133 MEQ/L (ref 136–145)
WBC # BLD AUTO: 7.2 TH/MM3 (ref 4–11)

## 2018-05-31 PROCEDURE — B24BZZ4 ULTRASONOGRAPHY OF HEART WITH AORTA, TRANSESOPHAGEAL: ICD-10-PCS | Performed by: INTERNAL MEDICINE

## 2018-05-31 RX ADMIN — HUMAN INSULIN SCH: 100 INJECTION, SOLUTION SUBCUTANEOUS at 21:20

## 2018-05-31 RX ADMIN — Medication SCH ML: at 21:19

## 2018-05-31 RX ADMIN — PANCRELIPASE SCH CAP: 60000; 12000; 38000 CAPSULE, DELAYED RELEASE PELLETS ORAL at 13:04

## 2018-05-31 RX ADMIN — METOCLOPRAMIDE HYDROCHLORIDE SCH MG: 10 TABLET ORAL at 08:43

## 2018-05-31 RX ADMIN — TIOTROPIUM BROMIDE SCH MCG: 18 CAPSULE ORAL; RESPIRATORY (INHALATION) at 08:42

## 2018-05-31 RX ADMIN — GUAIFENESIN PRN MG: 100 SOLUTION ORAL at 05:38

## 2018-05-31 RX ADMIN — ASPIRIN SCH MG: 81 TABLET ORAL at 08:42

## 2018-05-31 RX ADMIN — SERTRALINE HYDROCHLORIDE SCH MG: 50 TABLET, FILM COATED ORAL at 08:42

## 2018-05-31 RX ADMIN — ACYCLOVIR SCH UNITS: 800 TABLET ORAL at 21:20

## 2018-05-31 RX ADMIN — HUMAN INSULIN SCH: 100 INJECTION, SOLUTION SUBCUTANEOUS at 12:00

## 2018-05-31 RX ADMIN — HEPARIN SODIUM SCH UNITS: 10000 INJECTION, SOLUTION INTRAVENOUS; SUBCUTANEOUS at 21:19

## 2018-05-31 RX ADMIN — MORPHINE SULFATE PRN MG: 2 INJECTION, SOLUTION INTRAMUSCULAR; INTRAVENOUS at 08:43

## 2018-05-31 RX ADMIN — Medication PRN MG: at 00:44

## 2018-05-31 RX ADMIN — HEPARIN SODIUM SCH UNITS: 10000 INJECTION, SOLUTION INTRAVENOUS; SUBCUTANEOUS at 08:43

## 2018-05-31 RX ADMIN — PANCRELIPASE SCH CAP: 60000; 12000; 38000 CAPSULE, DELAYED RELEASE PELLETS ORAL at 18:19

## 2018-05-31 RX ADMIN — PANTOPRAZOLE SODIUM SCH MG: 20 TABLET, DELAYED RELEASE ORAL at 08:42

## 2018-05-31 RX ADMIN — ACYCLOVIR SCH UNITS: 800 TABLET ORAL at 08:44

## 2018-05-31 RX ADMIN — HUMAN INSULIN SCH: 100 INJECTION, SOLUTION SUBCUTANEOUS at 17:00

## 2018-05-31 RX ADMIN — HUMAN INSULIN SCH: 100 INJECTION, SOLUTION SUBCUTANEOUS at 08:00

## 2018-05-31 RX ADMIN — CEFTRIAXONE SODIUM SCH MLS/HR: 2 INJECTION, POWDER, FOR SOLUTION INTRAMUSCULAR; INTRAVENOUS at 10:06

## 2018-05-31 RX ADMIN — MORPHINE SULFATE PRN MG: 2 INJECTION, SOLUTION INTRAMUSCULAR; INTRAVENOUS at 00:10

## 2018-05-31 RX ADMIN — PANCRELIPASE SCH CAP: 60000; 12000; 38000 CAPSULE, DELAYED RELEASE PELLETS ORAL at 08:42

## 2018-05-31 NOTE — HHI.PR
Subjective


Remarks


We will consult on call cardiology for MAYI since has not seen Dr. Calzada in a 

very long time and she states that he is not her patient


Await cardiac evaluation for MAYI


Discussed with patient and RN


Patient has already ate today so no procedures would be done until tomorrow at 

the earliest








5-30 HAD MAYI 


POSITIVE FOR ENDOCARDITIS


WILL NEED ANTIBIOTICS AND WILL NEED PACEMAKER REMOVED PER DISCUSSION WITH ID 

AND CARDIOLOGY


DW RN AND PT AND CM


ANTIBIOTICS PER ID





5-31 PATIENT STATES HE IS TO HAVE AICD/PPM REMOVED TOMORROW


DW RN AND PT AND CM


CONTINUE ANTIBIOTICS FOR ENDOCARDITIS- WILL NEED DEVICE REMOVED


AM LABS





Objective


Vitals





Vital Signs








  Date Time  Temp Pulse Resp B/P (MAP) Pulse Ox O2 Delivery O2 Flow Rate FiO2


 


5/31/18 10:00  74      


 


5/31/18 09:49   14     


 


5/31/18 09:00  76      


 


5/31/18 08:07     97   


 


5/31/18 08:05     97 Room Air  


 


5/31/18 08:05 98.0 74 16 142/68 (92) 97   


 


5/31/18 08:00  72      


 


5/31/18 07:00  71      


 


5/31/18 06:38   16     


 


5/31/18 06:00  62      


 


5/31/18 05:00  64      


 


5/31/18 04:00  65      


 


5/31/18 03:49 97.2 66 14 131/82 (98) 95   


 


5/31/18 03:00  64      


 


5/31/18 02:00  69      


 


5/31/18 01:00  67      


 


5/31/18 00:18  82      


 


5/31/18 00:00  68      


 


5/30/18 23:56 98.4 67 18 123/79 (94) 98   


 


5/30/18 23:00  71      


 


5/30/18 22:00  64      


 


5/30/18 21:00  76      


 


5/30/18 20:30     97 Nasal Cannula 1.00 


 


5/30/18 20:16 98.4 69 18 139/89 (106) 97   


 


5/30/18 20:00  68      


 


5/30/18 19:36     97   21


 


5/30/18 19:00  78      


 


5/30/18 18:00  68      


 


5/30/18 17:00  68      


 


5/30/18 16:00  74      


 


5/30/18 15:15 98.0 76 16 128/90 (103) 98   


 


5/30/18 15:00  65      


 


5/30/18 14:00  96      


 


5/30/18 13:00  72      


 


5/30/18 12:28 97.8 69 18 126/84 (98) 96   


 


5/30/18 12:00  66      














I/O      


 


 5/30/18 5/30/18 5/30/18 5/31/18 5/31/18 5/31/18





 07:00 15:00 23:00 07:00 15:00 23:00


 


Intake Total 240 ml  860 ml 820 ml 100 ml 


 


Output Total 850 ml  1200 ml   


 


Balance -610 ml  -340 ml 820 ml 100 ml 


 


      


 


Intake Oral 240 ml  860 ml 720 ml  


 


IV Total    100 ml 100 ml 


 


Output Urine Total 850 ml  1200 ml   


 


# Voids    3  








Result Diagram:  


5/31/18 0528                                                                   

             5/31/18 0528





Other Results





 Laboratory Tests








Test


  5/29/18


05:19 5/30/18


05:01 5/30/18


05:07 5/31/18


05:28


 


Blood Urea Nitrogen 23 MG/DL  19 MG/DL   20 MG/DL 


 


Creatinine 1.16 MG/DL  1.12 MG/DL   0.98 MG/DL 


 


Random Glucose 120 MG/DL  77 MG/DL   69 MG/DL 


 


Calcium Level 8.6 MG/DL  9.5 MG/DL   9.1 MG/DL 


 


Sodium Level 136 MEQ/L  134 MEQ/L   133 MEQ/L 


 


Potassium Level 3.9 MEQ/L  4.1 MEQ/L   4.2 MEQ/L 


 


Chloride Level 95 MEQ/L  93 MEQ/L   96 MEQ/L 


 


Carbon Dioxide Level 31.3 MEQ/L  30.7 MEQ/L   26.5 MEQ/L 


 


Anion Gap 10 MEQ/L  10 MEQ/L   11 MEQ/L 


 


Estimat Glomerular Filtration


Rate 76 ML/MIN 


  79 ML/MIN 


  


  92 ML/MIN 


 


 


Total Protein  8.2 GM/DL   7.5 GM/DL 


 


Albumin  3.5 GM/DL   3.1 GM/DL 


 


Phosphorus Level  3.6 MG/DL   3.6 MG/DL 


 


Magnesium Level  2.0 MG/DL   2.0 MG/DL 


 


Alkaline Phosphatase  114 U/L   124 U/L 


 


Aspartate Amino Transf


(AST/SGOT) 


  24 U/L 


  


  23 U/L 


 


 


Alanine Aminotransferase


(ALT/SGPT) 


  29 U/L 


  


  30 U/L 


 


 


Total Bilirubin  0.6 MG/DL   0.5 MG/DL 


 


Hemoglobin A1c  11.7 %   


 


Free Thyroxine  1.40 NG/DL   


 


Thyroid Stimulating Hormone


3rd Gen 


  3.260 uIU/ML 


  


  


 


 


White Blood Count   10.2 TH/MM3  7.2 TH/MM3 


 


Red Blood Count   5.36 MIL/MM3  5.04 MIL/MM3 


 


Hemoglobin   16.2 GM/DL  15.3 GM/DL 


 


Hematocrit   48.2 %  45.3 % 


 


Mean Corpuscular Volume   89.9 FL  90.0 FL 


 


Mean Corpuscular Hemoglobin   30.3 PG  30.4 PG 


 


Mean Corpuscular Hemoglobin


Concent 


  


  33.7 % 


  33.8 % 


 


 


Red Cell Distribution Width   13.5 %  13.4 % 


 


Platelet Count   267 TH/MM3  242 TH/MM3 


 


Mean Platelet Volume   8.8 FL  8.9 FL 


 


Neutrophils (%) (Auto)   48.5 %  61.2 % 


 


Lymphocytes (%) (Auto)   30.6 %  21.5 % 


 


Monocytes (%) (Auto)   14.3 %  11.7 % 


 


Eosinophils (%) (Auto)   6.2 %  5.1 % 


 


Basophils (%) (Auto)   0.4 %  0.5 % 


 


Neutrophils # (Auto)   4.9 TH/MM3  4.4 TH/MM3 


 


Lymphocytes # (Auto)   3.1 TH/MM3  1.6 TH/MM3 


 


Monocytes # (Auto)   1.5 TH/MM3  0.8 TH/MM3 


 


Eosinophils # (Auto)   0.6 TH/MM3  0.4 TH/MM3 


 


Basophils # (Auto)   0.0 TH/MM3  0.0 TH/MM3 


 


CBC Comment   DIFF FINAL  DIFF FINAL 


 


Differential Comment      








Imaging





Last Impressions








Chest CT 5/26/18 0000 Signed





Impressions: 





 CONCLUSION:





 1.  Trace compressive/dependent atelectasis of both bases and tiny bilateral pl





 eural effusions. A slight degree of failure is conceivable. No pneumonic infilt





 rate demonstrated.





 2.  Mild emphysema and mild chronic interstitial changes are again noted. Stabl





 e, benign nodule in the right mid lung.





 3.  Stable diffuse prominent caliber of the thoracic aorta up to 4.1 cm.





  





 


 


Chest X-Ray 5/22/18 0753 Signed





Impressions: 





 CONCLUSION: No acute abnormality or significant interval change





  





 


 


Lung Scan-VQ Nuclear Medicine 5/22/18 0000 Signed





Impressions: 





 CONCLUSION: 





 1.  Low probability for PE.





 2.  Patchy uptake of tracer activity on the ventilation study suggestive of air





 space disease.





  





 


 


Lower Extremity Ultrasound 5/22/18 0000 Signed





Impressions: 





 CONCLUSION: 





 1.  No evidence of DVT.





  





 








Objective Remarks


GENERAL: Awake alert and oriented 3 talkative and cooperative


SKIN: Warm and dry.


HEAD: Atraumatic. Normocephalic. 


EYES: Pupils equal and round. No scleral icterus. No injection or drainage.  

Extraocular muscles intact


ENT: No nasal bleeding or discharge.  Mucous membranes pink and moist.  Tongue 

is midline


NECK: Trachea midline. No JVD.  Supple


CARDIOVASCULAR: Regular rate and rhythm.  S1-S2 no S3 or S4


RESPIRATORY: No accessory muscle use.  Few scattered rhonchi. Breath sounds 

equal bilaterally. 


GASTROINTESTINAL: Abdomen soft, non-tender, nondistended. Hepatic and splenic 

margins not palpable. 


MUSCULOSKELETAL: Extremities without clubbing, cyanosis, or edema. No obvious 

deformities. 


NEUROLOGICAL: Awake and alert. No obvious cranial nerve deficits.  Motor 

grossly within normal limits. Five out of 5 muscle strength in the arms and 

legs.  Normal speech.


PSYCHIATRIC: Appropriate mood and affect; insight and judgment normal.


Procedures


MAYI 5-30 SHOWED ENDOCARDITIS


Medications and IVs





Current Medications


Aspirin (Aspirin Chew) 162 mg ONCE  ONCE PO  Last administered on 5/22/18at 08:

09;  Start 5/22/18 at 08:00;  Stop 5/22/18 at 08:01;  Status DC


Sodium Chloride (NS Flush) 2 ml UNSCH  PRN IVF FLUSH AFTER USING IV ACCESS Last 

administered on 5/29/18at 09:34;  Start 5/22/18 at 08:00


Nitroglycerin (Nitrostat Sl) 0.4 mg Q5M SL  Last administered on 5/22/18at 08:29

;  Start 5/22/18 at 08:00;  Stop 5/22/18 at 08:11;  Status DC


Sodium Chloride 1,000 ml @  1,000 mls/hr Q1H IV  Last administered on 5/22/18at 

08:50;  Start 5/22/18 at 07:57;  Stop 5/22/18 at 08:56;  Status DC


Sodium Chloride 1,000 ml @  999 mls/hr BOLUS  ONCE IV  Last administered on 5/22 /18at 09:57;  Start 5/22/18 at 10:00;  Stop 5/22/18 at 11:00;  Status DC


Ceftriaxone Sodium 1000 mg/ Sodium Chloride 100 ml @  200 mls/hr ONCE  ONCE IV  

Last administered on 5/22/18at 09:56;  Start 5/22/18 at 10:00;  Stop 5/22/18 at 

10:29;  Status DC


Azithromycin 500 mg/Sodium Chloride 250 ml @  250 mls/hr ONCE  ONCE IV  Last 

administered on 5/22/18at 10:30;  Start 5/22/18 at 10:00;  Stop 5/22/18 at 10:59

;  Status DC


Sodium Chloride 250 ml @  250 mls/hr BOLUS  ONCE IV  Last administered on 5/22/ 18at 10:05;  Start 5/22/18 at 10:00;  Stop 5/22/18 at 10:59;  Status DC


Insulin Human Regular (NovoLIN R INJ) 8 units ONCE  ONCE SQ  Last administered 

on 5/22/18at 10:04;  Start 5/22/18 at 10:00;  Stop 5/22/18 at 10:03;  Status DC


Oxycodone HCl (Roxicodone) 15 mg Q8H  PRN PO PAIN 4-10;  Start 5/22/18 at 12:15

;  Stop 5/22/18 at 12:25;  Status DC


Dextrose (D50w (Vial) Inj) 50 ml UNSCH  PRN IV PUSH HYPOGLYCEMIA-SEE COMMENTS;  

Start 5/22/18 at 12:30;  Stop 5/25/18 at 08:36;  Status DC


Glucagon (Glucagon Inj) 1 mg UNSCH  PRN OTHER HYPOGLYCEMIA-SEE COMMENTS;  Start 

5/22/18 at 12:30;  Stop 5/25/18 at 08:36;  Status DC


Insulin Aspart (NovoLOG SUPPLEMENTAL SCALE) 1 ACHS SLIDING  SCALE SQ  Last 

administered on 5/23/18at 12:00;  Start 5/22/18 at 17:00;  Stop 5/23/18 at 13:46

;  Status DC


Aspirin (Ecotrin Ec) 81 mg DAILY PO  Last administered on 5/31/18at 08:42;  

Start 5/23/18 at 09:00


Lorazepam (Ativan) 0.5 mg Q8H  PRN PO ANXIETY AND/OR AGITATION Last 

administered on 5/29/18at 03:12;  Start 5/22/18 at 12:30


Metoclopramide HCl (Reglan) 10 mg DAILY PO  Last administered on 5/31/18at 08:43

;  Start 5/23/18 at 09:00


Amylase/Lipase/ Protease (Creon 12-38-60) 1 cap TIDPC PO  Last administered on 5 /31/18at 08:42;  Start 5/22/18 at 13:30


Pantoprazole Sodium (Protonix) 20 mg DAILY PO  Last administered on 5/31/18at 08

:42;  Start 5/23/18 at 09:00


Sertraline HCl (Zoloft) 50 mg DAILY PO  Last administered on 5/31/18at 08:42;  

Start 5/23/18 at 09:00


Oxycodone HCl (Roxicodone) 15 mg Q8H  PRN PO PAIN 4-10;  Start 5/22/18 at 12:30

;  Stop 5/22/18 at 12:30;  Status DC


Miscellaneous (Pill Splitter) 1 ea UNSCH  PRN OTHER SEE LABEL COMMENTS;  Start 5 /22/18 at 12:30


Oxycodone HCl (Roxicodone) 15 mg Q8H  PRN PO PAIN 4-10 Last administered on 5/31 /18at 05:31;  Start 5/22/18 at 12:30


Albuterol/ Ipratropium (Duoneb Neb) 1 ampule Q4HR  NEB NEB  Last administered 

on 5/26/18at 03:42;  Start 5/22/18 at 16:00;  Stop 5/26/18 at 15:59;  Status DC


Albuterol Sulfate (Albuterol Neb) 1.25 mg Q2HR NEB  PRN NEB SHORTNESS OF BREATH 

Last administered on 5/26/18at 21:16;  Start 5/22/18 at 12:45


Methylprednisolone Sodium Succinate (SoluMEDROL INJ) 40 mg Q8HR IV PUSH  Last 

administered on 5/23/18at 06:33;  Start 5/22/18 at 14:00;  Stop 5/23/18 at 10:16

;  Status DC


Insulin Detemir (Levemir Inj) 15 units HS SQ  Last administered on 5/22/18at 20:

33;  Start 5/22/18 at 21:00;  Stop 5/23/18 at 13:46;  Status DC


Heparin Sodium (Porcine) (Heparin Inj) 5,000 units Q12HR SQ  Last administered 

on 5/31/18at 08:43;  Start 5/22/18 at 21:00


Lisinopril (Prinivil) 10 mg DAILY PO  Last administered on 5/24/18at 08:53;  

Start 5/23/18 at 09:00;  Stop 5/25/18 at 08:07;  Status DC


Ceftriaxone Sodium 1000 mg/ Sodium Chloride 100 ml @  200 mls/hr Q24H IV  Last 

administered on 5/23/18at 09:45;  Start 5/23/18 at 09:00;  Stop 5/23/18 at 19:58

;  Status DC


Azithromycin 500 mg/Sodium Chloride 250 ml @  250 mls/hr Q24H IV  Last 

administered on 5/23/18at 12:01;  Start 5/23/18 at 10:00;  Stop 5/23/18 at 19:58

;  Status DC


Acetaminophen (Tylenol) 650 mg Q4H  PRN PO FEVER/PAIN 1-3;  Start 5/22/18 at 13:

30


Ondansetron HCl (Zofran  Odt) 4 mg Q8HR  PRN PO NAUSEA Last administered on 5/22 /18at 16:58;  Start 5/22/18 at 13:30


Guaifenesin (Robitussin Liq) 200 mg Q4H  PRN PO COUGH Last administered on 5/31/ 18at 05:38;  Start 5/22/18 at 18:30


Morphine Sulfate (Morphine Inj) 2 mg Q4H  PRN IV SEE LABEL COMMENTS Last 

administered on 5/22/18at 20:32;  Start 5/22/18 at 18:30;  Stop 5/23/18 at 10:23

;  Status DC


Vancomycin HCl 1000 mg/Sodium Chloride 250 ml @  250 mls/hr Q12H IV ;  Start 5/ 23/18 at 10:00;  Status UNV


Pharmacy Profile Note 0 ml @ 0 mls/hr UNSCH OTHER ;  Start 5/23/18 at 10:00;  

Stop 5/23/18 at 19:58;  Status DC


Prednisone (Deltasone) 20 mg BID PO  Last administered on 5/24/18at 08:53;  

Start 5/23/18 at 21:00;  Stop 5/24/18 at 13:03;  Status DC


Sodium Chloride 1,000 ml @  84 mls/hr [id number] IV  Last administered on 5/25/ 18at 17:35;  Start 5/23/18 at 11:00;  Stop 5/26/18 at 11:17;  Status DC


Vancomycin HCl 1250 mg/Sodium Chloride 262.5 ml @  250 mls/hr Q18H IV  Last 

administered on 5/23/18at 13:38;  Start 5/23/18 at 12:00;  Stop 5/23/18 at 19:58

;  Status DC


Morphine Sulfate (Morphine Inj) 0.5 mg Q4H  PRN IV BREAKTHROUGH PAIN Last 

administered on 5/31/18at 08:43;  Start 5/23/18 at 10:30


Melatonin (Melatonin) 5 mg HS  PRN PO sleep aid Last administered on 5/31/18at 

00:44;  Start 5/23/18 at 21:00


Miscellaneous Information ([facility] Ordered Lab Info) 1745SPECIFIC LAB TO 

BE DRAWN:VANCOMY... ONCE  ONCE .XX ;  Start 5/25/18 at 17:45;  Stop 5/25/18 at 

17:45;  Status DC


Insulin Detemir (Levemir Inj) 15 units BID SQ  Last administered on 5/24/18at 08

:53;  Start 5/23/18 at 21:00;  Stop 5/24/18 at 13:01;  Status DC


Dextrose (D50w (Vial) Inj) 50 ml UNSCH  PRN IV PUSH HYPOGLYCEMIA-SEE COMMENTS;  

Start 5/23/18 at 13:45


Glucagon (Glucagon Inj) 1 mg UNSCH  PRN OTHER HYPOGLYCEMIA-SEE COMMENTS;  Start 

5/23/18 at 13:45


Insulin Human Regular (NovoLIN R SUPPLEMENTAL SCALE) 1 ACHS SLIDING  SCALE SQ  

Last administered on 5/30/18at 20:34;  Start 5/23/18 at 17:00


Ceftriaxone Sodium 2000 mg/ Sodium Chloride 100 ml @  200 mls/hr Q24H IV ;  

Start 5/24/18 at 09:00;  Stop 5/24/18 at 09:07;  Status DC


Ceftriaxone Sodium 2000 mg/ Sodium Chloride 100 ml @  200 mls/hr Q24H IV  Last 

administered on 5/31/18at 10:06;  Start 5/24/18 at 10:00


Insulin Detemir (Levemir Inj) 20 units BID SQ  Last administered on 5/28/18at 09

:00;  Start 5/24/18 at 21:00;  Stop 5/28/18 at 17:17;  Status DC


Prednisone (Deltasone) 10 mg BID PO  Last administered on 5/26/18at 10:34;  

Start 5/24/18 at 21:00;  Stop 5/26/18 at 11:15;  Status DC


Pharmacy Profile Note 0 ml @ 0 mls/hr UNSCH OTHER ;  Start 5/24/18 at 14:15;  

Stop 5/28/18 at 11:24;  Status DC


Vancomycin HCl 1250 mg/Sodium Chloride 262.5 ml @  250 mls/hr Q12H IV  Last 

administered on 5/28/18at 05:05;  Start 5/24/18 at 17:00;  Stop 5/28/18 at 11:24

;  Status DC


Miscellaneous Information ([facility] Ordered Lab Info) SPECIFIC LAB TO BE 

DRAWN:VANCOMYCIN TROUGH DATE TO... ONCE  ONCE .XX  Last administered on 5/26/ 18at 05:00;  Start 5/26/18 at 04:45;  Stop 5/26/18 at 04:46;  Status DC


Tiotropium Bromide (Spiriva Inh) 18 mcg DAILY INH  Last administered on 5/31/ 18at 08:42;  Start 5/25/18 at 09:30


Clonidine (Catapres) 0.1 mg Q6H  PRN PO SBP>160, DBP>90;  Start 5/25/18 at 08:15


Lisinopril (Prinivil) 20 mg DAILY PO  Last administered on 5/27/18at 10:27;  

Start 5/25/18 at 09:00;  Stop 5/27/18 at 10:36;  Status DC


Prednisone (Deltasone) 10 mg DAILY PO  Last administered on 5/28/18at 08:34;  

Start 5/27/18 at 09:00;  Stop 5/28/18 at 17:03;  Status DC


Lisinopril (Prinivil) 20 mg BID PO  Last administered on 5/27/18at 20:15;  

Start 5/27/18 at 21:00;  Stop 5/28/18 at 16:31;  Status DC


Sodium Polystyrene Sulfonate (Kayexalate Liq) 15 gm QID PO  Last administered 

on 5/28/18at 11:32;  Start 5/28/18 at 09:00;  Stop 5/28/18 at 17:11;  Status DC


Lisinopril (Prinivil) 20 mg DAILY PO ;  Start 5/29/18 at 09:00;  Status Cancel


Sodium Chloride 500 ml @  50 mls/hr Q10H IV  Last administered on 5/28/18at 16:

45;  Start 5/28/18 at 16:45;  Stop 5/29/18 at 02:44;  Status DC


Insulin Detemir (Levemir Inj) 24 units BID SQ  Last administered on 5/31/18at 08

:44;  Start 5/28/18 at 21:00





A/P


Problem List:  


(1) Sepsis


ICD Code:  A41.9 - Sepsis, unspecified organism


Status:  Acute


Assessment and Plan


Assessment and Plan


67 years old male





Group B Strep sepsis secondary to clinical Pneumonia-- sputum MRSA


- started on Ceftriaxone 2 gm daily 5/23


- Vancomycin added 5/24


- ID ff 


- repeat blood cultures - negative so far- x 4 days


- no skin lesions/marks- denies history of IVDU- admits to smoking cocaine


- ff CBC- expect WBC to be high - on steroids


- echo  unremarkable 


- Cardiology consult for MAYI- per ID recommendations


- patient also has a PM placed in 2005- per patient has not followed with Dr. Calzada in a very long time we will consult on-call for MAYI


-MAYI POSITIVE FOR ENDOCARDITIS-- NEEDS PACEMAKER/AICD REMOVED AND ANTIBIOTICS





Acute hypoxemic respiratory failure due to COPD exacerbation- improved 

clinically


- heavy smoker in the past 


- lungs- no wheezes, + rales left base


  keep on oxygen to keep O2 sat >90%- continue with neb treatment; scheduled 


  V/Q scan negative 


- change to po steroids- prednisone  taper. DC prednisone today 5./28


- continue  home inhalers





hypertension


History of AICD


- on  lisinopril 20 mg po bid  states he was on 40 mg po bid as OP- -  will DC 

with elevated Potassium


- Clonidine prn


- consider restarting at a lower dose once K improved  or use CCB- amlodipine 


NEEDS AICD/PACEMAKER REMOVED








diabetes mellitus; uncontrolled-a1C 11.7


- expect to be high on steroids- completed course today 5/28


 started  on levemir  - increased dose to 24 untis  bid 24 - continue to adjust 

and monitor 


- medium sliding scale Novolog


- monitor - Prednisone- DC today 5/28


- reinforced diabetes education











Acute kidney injury on top CKD- Improved


- off IVF


- patient  with  good po intake





Hyperkalemia- on BMP today 5/28


- patient on Lisinorpil 20 mg po bid (home was on 40 mg po bid)


- HOld Lisinopril


- restart at a lower dose if K level improved  or switch to amlodipine for HTN  

if K is going to be an chronic problem


- given kayexalate 30 gm po x 2


- NS 50 cc/hr - 500 cc 


- BMP in am





History of chronic pancreatitis-


 on creon and reglan daily





chronic back pain; resume home meds.


-DVT prophylaxis with subq heparin








d/w patient - up and increase activity


Discharge Planning


Pending ID clearance and cardiac


WILL NEED PACER/AICD REMOVED DUE TO MRSA AND ENDOCARDITIS





Problem Qualifiers





(1) Sepsis:  


Qualified Codes:  A41.9 - Sepsis, unspecified organism








Ryan Funk DO May 31, 2018 11:04

## 2018-05-31 NOTE — ECHRPT
Indication:   BACTEREMIA 

 

 CONCLUSIONS 

 The left ventricular systolic function is mildly reduced with an estimated ejection fraction in the 
range of 45- 

 50%.  

 Trace mitral valve regurgitation.  

 Small mobile structure with extra-cardiac motion noted at the base of the non-coronary cusp, consist
ent  

 with a vegetation.  Measures 0.4cm x 0.4cm.  

 There is mild tricuspid valve regurgitation. 

 

 BP:        /         HR:                          Rhythm:           Sinus 

 

                                                   Technical Quality:Fair 

 

 Medications 

 Complications 

 Proc. Components   Anesthesia at bedside for sedation 

 FINDINGS 

 

 LEFT VENTRICLE 

 Normal left ventricular size.  

 The left ventricular systolic function is mildly reduced with an estimated ejection fraction in the 
range of 45- 

 50%.  

 

 RIGHT VENTRICLE 

 Grossly normal 

 

 LEFT ATRIUM 

 The left atrial size is normal.  

 

 RIGHT ATRIUM 

 The right atrial size is normal.  

 

 ATRIAL APPENDAGES 

 Normal left atrial appendage size with no evidence of thrombus formation.  

 

 ATRIAL SEPTUM 

 Normal atrial septal thickness.  

 

 AORTA 

 Descending aorta with no evidence of dissection 

 

 MITRAL VALVE 

 Structurally normal mitral valve.  

 Trace mitral valve regurgitation.  

 No mitral valve stenosis.  

 

 AORTIC VALVE 

 Trileaflet aortic valve.  

 No aortic valve regurgitation.  

 No aortic valve stenosis.  

 Small mobile structure with extra-cardiac motion noted at the base of the non-coronary cusp, consist
ent  

 with a vegetation.  Measures 0.4cm x 0.4cm 

 

 TRICUSPID VALVE 

 Structurally normal tricuspid valve. There is mild tricuspid valve regurgitation. No tricuspid valve
 stenosis.  

 

 VESSELS 

 

 

 

 

 The pulmonary valve is not well visualized.  

 

 

 

 

  Prabhu Watkins DO 

  (Electronically Signed) 

  Final Date:31 May 2018 00:02

## 2018-05-31 NOTE — MB
cc:

Terwilliger,Jacqueline R ARNP Meyers,Cary H MD

****

 

 

DATE:

05/31/2018

 

HISTORY OF PRESENT ILLNESS:

A 68-year-old male presented to the [facility] emergency department with 

shortness of breath, also cough, felt like he could not catch his 

breath, hurt more when he took a deep breath.  Troponins were 

apparently negative on arrival.  White cell count and blood cultures 

were checked.  He had 4/4 blood cultures, gram-positive cocci.  White 

cell count was 15-17,000 with a bandemia of 15%.  Blood sugar was also

300.  He was initially started on broad spectrum antibiotics.  He had 

a pacemaker put in 10 years ago.  He denies having any generator 

change out.  The patient underwent evaluation by Dr. Watkins and 

underwent a transesophageal echocardiogram which showed a small, 

mobile structure with extra cardiac motion noted at the base of the 

noncoronary cusp consistent with vegetation measuring 0.4 x 0.4 cm.  

This was on the aortic valve.  We were consulted for evaluation and 

removal of current pacemaker and epicardial leads.

 

PAST MEDICAL HISTORY:

Includes diabetes mellitus, COPD, chronic kidney disease stage III, 

hypertension, chronic pancreatitis, chronic back pain, history of 

bradycardia.

 

PAST SURGICAL HISTORY:

Include a Biotronik pacemaker placed in 2010 for symptomatic 

bradycardia.  He has had I and D of metacarpophalangeal joint and 

repair of extensor tendon, right finger 2015, cardiac catheterization 

2016, left main was normal.  The left circ and 3 OMs were normal.  RCA

was small, but dominant with no significant disease.  History of 

lumbar spine surgery, cholecystectomy.

 

ALLERGIES:

NO KNOWN ALLERGIES.

 

HOME MEDICATIONS:

Include:

1.  DuoNebs.

2.  ProAir.

3.  Clonidine.

4.  Cialis.

5.  Lisinopril.

6.  Aspirin.

7.  Roxicodone.

8  Zoloft.

9. Ativan.

10. Creon capsules.

11. Protonix.

12. Reglan.

13. Janumet.

14. Bydureon.

 

FAMILY HISTORY:

No premature coronary artery disease or sudden death.

 

SOCIAL HISTORY:

Smoked, but quit about 10 years ago.  Denies any alcohol.  He has used

cocaine in the past, only snorts it.  No IV drug use.  Denies any 

marijuana use.

 

REVIEW OF SYSTEMS:

As above in the HPI.  The 12 systems are unremarkable.

 

PHYSICAL EXAMINATION:

VITAL SIGNS:  Blood pressure 140/60, heart rate of 76, temperature max

98.

GENERAL:  The patient is awake and alert in no acute distress.

HEENT:  Head is normocephalic, atraumatic.  Pupils equal and reactive.

 Oral mucosa pink, moist.

NECK:  Supple.  No JVD.

CARDIOVASCULAR:  Sounds S1, S2.  No audible rubs, murmurs, or gallops.

LUNGS:  Clear to auscultation.  No wheezes, rales or rhonchi.

ABDOMEN:  Slightly protuberant, soft, nontender, nondistended.  No 

organomegaly noted.

EXTREMITIES:  No cyanosis, clubbing or edema.

NEUROLOGIC:  A and O x 3.  No focal deficits.

SKIN:  Warm and dry, intact with no lesions or rashes.

 

LABORATORY DATA:

Shows hemoglobin 15, hematocrit of 45, white cell count of 7.2, 

platelet count of 242.

 

Sodium 133, potassium 4.2, BUN of 20, creatinine 0.98, INR 1.0.

 

Urinalysis is unremarkable.

 

Tox screen showed positive for opiates, positive for cocaine.  Micro 

showed group B beta strep.  Sputum showed Staph MRSA.

 

Chest CT, some atelectasis both bases with tiny small effusions, mild 

emphysema, mild chronic interstitial changes.

 

IMPRESSION:

This is a 68-year-old male with a history of pacemaker in situ, group 

B Streptococcus bacteremia, aortic valve endocarditis, consultation 

for pacemaker removal and epicardial leads.  We will plan for surgery 

in the a.m.  In the meantime, we will order type and cross. The 

patient has all other radiological exams available and will have 2 

units available for surgery.  Procedures, alternatives and risks 

discussed by Dr. Kristina Garsia and the patient is agreeable to proceed.

 

 

__________________________________ __________________________________

Jacqueline R. Terwilliger, ARNP Cary H. Meyers, MD [facility]

D: 05/31/2018, 01:15 PM

T: 05/31/2018, 01:47 PM

Visit #: [id number]

Job #: [id number]

## 2018-05-31 NOTE — PD.CARD.PN
Subjective


Subjective Remarks


No events overnight





MAYI showing very small vegetation noted on aortic valve





Objective


Medications





Current Medications








 Medications


  (Trade)  Dose


 Ordered  Sig/Jean Carlos


 Route  Start Time


 Stop Time Status Last Admin


 


  (NS Flush)  2 ml  UNSCH  PRN


 IVF  5/22/18 08:00


    5/29/18 09:34


 


 


  (Ecotrin Ec)  81 mg  DAILY


 PO  5/23/18 09:00


    5/31/18 08:42


 


 


  (Ativan)  0.5 mg  Q8H  PRN


 PO  5/22/18 12:30


    5/29/18 03:12


 


 


  (Reglan)  10 mg  DAILY


 PO  5/23/18 09:00


    5/31/18 08:43


 


 


  (Creon 12-38-60)  1 cap  TIDPC


 PO  5/22/18 13:30


    5/31/18 13:04


 


 


  (Protonix)  20 mg  DAILY


 PO  5/23/18 09:00


    5/31/18 08:42


 


 


  (Zoloft)  50 mg  DAILY


 PO  5/23/18 09:00


    5/31/18 08:42


 


 


  (Pill Splitter)  1 ea  UNSCH  PRN


 OTHER  5/22/18 12:30


     


 


 


  (Roxicodone)  15 mg  Q8H  PRN


 PO  5/22/18 12:30


    5/31/18 05:31


 


 


  (Albuterol Neb)  1.25 mg  Q2HR NEB  PRN


 NEB  5/22/18 12:45


    5/26/18 21:16


 


 


  (Heparin Inj)  5,000 units  Q12HR


 SQ  5/22/18 21:00


    5/31/18 08:43


 


 


  (Tylenol)  650 mg  Q4H  PRN


 PO  5/22/18 13:30


     


 


 


  (Zofran  Odt)  4 mg  Q8HR  PRN


 PO  5/22/18 13:30


    5/22/18 16:58


 


 


  (Robitussin Liq)  200 mg  Q4H  PRN


 PO  5/22/18 18:30


    5/31/18 05:38


 


 


  (Morphine Inj)  0.5 mg  Q4H  PRN


 IV  5/23/18 10:30


    5/31/18 08:43


 


 


  (Melatonin)  5 mg  HS  PRN


 PO  5/23/18 21:00


    5/31/18 00:44


 


 


  (D50w (Vial) Inj)  50 ml  UNSCH  PRN


 IV PUSH  5/23/18 13:45


     


 


 


  (Glucagon Inj)  1 mg  UNSCH  PRN


 OTHER  5/23/18 13:45


     


 


 


  (NovoLIN R


 SUPPLEMENTAL


 SCALE)  1  ACHS SLIDING  SCALE


 SQ  5/23/18 17:00


    5/31/18 12:00


 


 


 Ceftriaxone


 Sodium 2000 mg/


 Sodium Chloride  100 ml @ 


 200 mls/hr  Q24H


 IV  5/24/18 10:00


    5/31/18 10:06


 


 


  (Spiriva Inh)  18 mcg  DAILY


 INH  5/25/18 09:30


    5/31/18 08:42


 


 


  (Catapres)  0.1 mg  Q6H  PRN


 PO  5/25/18 08:15


     


 


 


  (Levemir Inj)  24 units  BID


 SQ  5/28/18 21:00


    5/31/18 08:44


 


 


  (NS Flush)  2 ml  BID


 IV FLUSH  5/31/18 21:00


   UNV  


 


 


  (NS Flush)  2 ml  UNSCH  PRN


 IV FLUSH  5/31/18 13:30


   UNV  


 


 


 Cefazolin Sodium


 500 mg/Sodium


 Chloride  505 ml @ 0


 mls/hr  ON  CALL


 IRRIGATION  5/31/18 13:30


 6/7/18 13:29 UNV  


 


 


 Cefazolin Sodium/


 Dextrose  50 ml @ 


 150 mls/hr  ON  CALL


 IV  5/31/18 13:30


 6/7/18 13:29 UNV  


 


 


  (Hibiclens 4%


 Top Soln)  1 applic  ON  CALL


 TOPICAL  5/31/18 13:30


 6/7/18 13:29 UNV  


 








Vital Signs / I&O





Vital Signs








  Date Time  Temp Pulse Resp B/P (MAP) Pulse Ox O2 Delivery O2 Flow Rate FiO2


 


5/31/18 13:23 97.6 83 14 129/85 (100) 93   


 


5/31/18 10:00  74      


 


5/31/18 09:49   14     


 


5/31/18 09:00  76      


 


5/31/18 08:07     97   


 


5/31/18 08:05     97 Room Air  


 


5/31/18 08:05 98.0 74 16 142/68 (92) 97   


 


5/31/18 08:00  72      


 


5/31/18 07:00  71      


 


5/31/18 06:38   16     


 


5/31/18 06:00  62      


 


5/31/18 05:00  64      


 


5/31/18 04:00  65      


 


5/31/18 03:49 97.2 66 14 131/82 (98) 95   


 


5/31/18 03:00  64      


 


5/31/18 02:00  69      


 


5/31/18 01:00  67      


 


5/31/18 00:18  82      


 


5/31/18 00:00  68      


 


5/30/18 23:56 98.4 67 18 123/79 (94) 98   


 


5/30/18 23:00  71      


 


5/30/18 22:00  64      


 


5/30/18 21:00  76      


 


5/30/18 20:30     97 Nasal Cannula 1.00 


 


5/30/18 20:16 98.4 69 18 139/89 (106) 97   


 


5/30/18 20:00  68      


 


5/30/18 19:36     97   21


 


5/30/18 19:00  78      


 


5/30/18 18:00  68      


 


5/30/18 17:00  68      


 


5/30/18 16:00  74      


 


5/30/18 15:15 98.0 76 16 128/90 (103) 98   


 


5/30/18 15:00  65      


 


5/30/18 14:00  96      














I/O      


 


 5/30/18 5/30/18 5/30/18 5/31/18 5/31/18 5/31/18





 07:00 15:00 23:00 07:00 15:00 23:00


 


Intake Total 240 ml  860 ml 820 ml 100 ml 


 


Output Total 850 ml  1200 ml   


 


Balance -610 ml  -340 ml 820 ml 100 ml 


 


      


 


Intake Oral 240 ml  860 ml 720 ml  


 


IV Total    100 ml 100 ml 


 


Output Urine Total 850 ml  1200 ml   


 


# Voids    3  








Physical Exam


GENERAL: NAD, AAOx3


SKIN: Warm and dry.


HEAD: Atraumatic. Normocephalic. 


EYES: Pupils equal and round. No scleral icterus. No injection or drainage. 


ENT: No nasal bleeding or discharge.  Mucous membranes pink and moist.


NECK: Trachea midline. No JVD. 


CARDIOVASCULAR: Regular rate and rhythm.  


RESPIRATORY: No accessory muscle use. Clear to auscultation. Breath sounds 

equal bilaterally. 


GASTROINTESTINAL: Abdomen soft, non-tender, nondistended. Hepatic and splenic 

margins not palpable. 


MUSCULOSKELETAL: Extremities without clubbing, cyanosis, or edema. No obvious 

deformities. 


NEUROLOGICAL: Awake and alert. No obvious cranial nerve deficits.  Motor 

grossly within normal limits. Five out of 5 muscle strength in the arms and 

legs.  Normal speech.


PSYCHIATRIC: Appropriate mood and affect; insight and judgment normal.


Laboratory





Laboratory Tests








Test


  5/31/18


05:28


 


White Blood Count 7.2 TH/MM3 


 


Red Blood Count 5.04 MIL/MM3 


 


Hemoglobin 15.3 GM/DL 


 


Hematocrit 45.3 % 


 


Mean Corpuscular Volume 90.0 FL 


 


Mean Corpuscular Hemoglobin 30.4 PG 


 


Mean Corpuscular Hemoglobin


Concent 33.8 % 


 


 


Red Cell Distribution Width 13.4 % 


 


Platelet Count 242 TH/MM3 


 


Mean Platelet Volume 8.9 FL 


 


Neutrophils (%) (Auto) 61.2 % 


 


Lymphocytes (%) (Auto) 21.5 % 


 


Monocytes (%) (Auto) 11.7 % 


 


Eosinophils (%) (Auto) 5.1 % 


 


Basophils (%) (Auto) 0.5 % 


 


Neutrophils # (Auto) 4.4 TH/MM3 


 


Lymphocytes # (Auto) 1.6 TH/MM3 


 


Monocytes # (Auto) 0.8 TH/MM3 


 


Eosinophils # (Auto) 0.4 TH/MM3 


 


Basophils # (Auto) 0.0 TH/MM3 


 


CBC Comment DIFF FINAL 


 


Differential Comment  


 


Blood Urea Nitrogen 20 MG/DL 


 


Creatinine 0.98 MG/DL 


 


Random Glucose 69 MG/DL 


 


Total Protein 7.5 GM/DL 


 


Albumin 3.1 GM/DL 


 


Calcium Level 9.1 MG/DL 


 


Phosphorus Level 3.6 MG/DL 


 


Magnesium Level 2.0 MG/DL 


 


Alkaline Phosphatase 124 U/L 


 


Aspartate Amino Transf


(AST/SGOT) 23 U/L 


 


 


Alanine Aminotransferase


(ALT/SGPT) 30 U/L 


 


 


Total Bilirubin 0.5 MG/DL 


 


Sodium Level 133 MEQ/L 


 


Potassium Level 4.2 MEQ/L 


 


Chloride Level 96 MEQ/L 


 


Carbon Dioxide Level 26.5 MEQ/L 


 


Anion Gap 11 MEQ/L 


 


Estimat Glomerular Filtration


Rate 92 ML/MIN 


 











Assessment and Plan


Problem List:  


(1) Sepsis


ICD Codes:  A41.9 - Sepsis, unspecified organism


Status:  Acute


(2) Polysubstance abuse


ICD Codes:  F19.10 - Other psychoactive substance abuse, uncomplicated


Status:  Acute


(3) HTN (hypertension)


ICD Codes:  I10 - Hypertension


Status:  Chronic


(4) DKA (diabetic ketoacidoses)


ICD Codes:  E13.10 - Other specified diabetes mellitus with ketoacidosis 

without coma


Status:  Acute


(5) COPD (chronic obstructive pulmonary disease)


ICD Codes:  J44.9 - Chronic obstructive pulmonary disease


Status:  Chronic


(6) Cocaine abuse


ICD Codes:  F14.10 - Cocaine abuse


Status:  Acute


(7) Pacemaker


ICD Codes:  Z95.0 - Presence of cardiac pacemaker


Status:  Chronic


Assessment and Plan


1) Bacteremia with Group B Strep


   MAYI showing very small vegetation noted on aortic valve


   No other vegetations noted


2) PPM Interrogation showing intrinsic rhythm sinus 60-70


   Discussed with Dr. Garsia, consideration of PPM removal tomorrow


3) Atypical CP


   Trops negative


   Possible due to his PNA


4) Cocaine cessation





Problem Qualifiers





(1) Sepsis:  


Qualified Codes:  A41.9 - Sepsis, unspecified organism








Prabhu Watkins DO May 31, 2018 13:35

## 2018-06-01 VITALS
DIASTOLIC BLOOD PRESSURE: 81 MMHG | OXYGEN SATURATION: 98 % | SYSTOLIC BLOOD PRESSURE: 135 MMHG | TEMPERATURE: 98.1 F | RESPIRATION RATE: 18 BRPM | HEART RATE: 77 BPM

## 2018-06-01 VITALS
HEART RATE: 78 BPM | RESPIRATION RATE: 18 BRPM | DIASTOLIC BLOOD PRESSURE: 87 MMHG | TEMPERATURE: 97.9 F | OXYGEN SATURATION: 97 % | SYSTOLIC BLOOD PRESSURE: 141 MMHG

## 2018-06-01 VITALS
OXYGEN SATURATION: 96 % | DIASTOLIC BLOOD PRESSURE: 81 MMHG | SYSTOLIC BLOOD PRESSURE: 128 MMHG | HEART RATE: 84 BPM | RESPIRATION RATE: 20 BRPM | TEMPERATURE: 98.3 F

## 2018-06-01 VITALS — HEART RATE: 90 BPM

## 2018-06-01 VITALS
SYSTOLIC BLOOD PRESSURE: 133 MMHG | TEMPERATURE: 98.5 F | HEART RATE: 73 BPM | DIASTOLIC BLOOD PRESSURE: 91 MMHG | RESPIRATION RATE: 16 BRPM | OXYGEN SATURATION: 98 %

## 2018-06-01 VITALS
DIASTOLIC BLOOD PRESSURE: 74 MMHG | HEART RATE: 72 BPM | OXYGEN SATURATION: 98 % | RESPIRATION RATE: 16 BRPM | TEMPERATURE: 98 F | SYSTOLIC BLOOD PRESSURE: 125 MMHG

## 2018-06-01 VITALS — HEART RATE: 72 BPM

## 2018-06-01 VITALS — HEART RATE: 78 BPM

## 2018-06-01 VITALS — HEART RATE: 69 BPM

## 2018-06-01 VITALS — HEART RATE: 83 BPM

## 2018-06-01 VITALS — HEART RATE: 68 BPM

## 2018-06-01 VITALS — HEART RATE: 88 BPM

## 2018-06-01 VITALS — HEART RATE: 80 BPM

## 2018-06-01 VITALS — OXYGEN SATURATION: 97 %

## 2018-06-01 LAB
ALBUMIN SERPL-MCNC: 3.2 GM/DL (ref 3.4–5)
ALP SERPL-CCNC: 141 U/L (ref 45–117)
ALT SERPL-CCNC: 31 U/L (ref 12–78)
AST SERPL-CCNC: 27 U/L (ref 15–37)
BASOPHILS # BLD AUTO: 0 TH/MM3 (ref 0–0.2)
BASOPHILS NFR BLD: 0.5 % (ref 0–2)
BILIRUB SERPL-MCNC: 0.6 MG/DL (ref 0.2–1)
BUN SERPL-MCNC: 18 MG/DL (ref 7–18)
CALCIUM SERPL-MCNC: 8.8 MG/DL (ref 8.5–10.1)
CHLORIDE SERPL-SCNC: 94 MEQ/L (ref 98–107)
CREAT SERPL-MCNC: 1.04 MG/DL (ref 0.6–1.3)
EOSINOPHIL # BLD: 0.2 TH/MM3 (ref 0–0.4)
EOSINOPHIL NFR BLD: 3.2 % (ref 0–4)
ERYTHROCYTE [DISTWIDTH] IN BLOOD BY AUTOMATED COUNT: 13.6 % (ref 11.6–17.2)
GFR SERPLBLD BASED ON 1.73 SQ M-ARVRAT: 86 ML/MIN (ref 89–?)
GLUCOSE SERPL-MCNC: 60 MG/DL (ref 74–106)
HCO3 BLD-SCNC: 26.2 MEQ/L (ref 21–32)
HCT VFR BLD CALC: 46.3 % (ref 39–51)
HGB BLD-MCNC: 15.8 GM/DL (ref 13–17)
INR PPP: 1 RATIO
LYMPHOCYTES # BLD AUTO: 1.3 TH/MM3 (ref 1–4.8)
LYMPHOCYTES NFR BLD AUTO: 19.7 % (ref 9–44)
MAGNESIUM SERPL-MCNC: 2.1 MG/DL (ref 1.5–2.5)
MCH RBC QN AUTO: 30.4 PG (ref 27–34)
MCHC RBC AUTO-ENTMCNC: 34.1 % (ref 32–36)
MCV RBC AUTO: 89.2 FL (ref 80–100)
MONOCYTE #: 0.8 TH/MM3 (ref 0–0.9)
MONOCYTES NFR BLD: 11.9 % (ref 0–8)
NEUTROPHILS # BLD AUTO: 4.4 TH/MM3 (ref 1.8–7.7)
NEUTROPHILS NFR BLD AUTO: 64.7 % (ref 16–70)
PHOSPHATE SERPL-MCNC: 3.6 MG/DL (ref 2.5–4.9)
PLATELET # BLD: 257 TH/MM3 (ref 150–450)
PMV BLD AUTO: 8.2 FL (ref 7–11)
PROT SERPL-MCNC: 8.1 GM/DL (ref 6.4–8.2)
PROTHROMBIN TIME: 10 SEC (ref 9.8–11.6)
RBC # BLD AUTO: 5.19 MIL/MM3 (ref 4.5–5.9)
SODIUM SERPL-SCNC: 132 MEQ/L (ref 136–145)
WBC # BLD AUTO: 6.8 TH/MM3 (ref 4–11)

## 2018-06-01 PROCEDURE — 0JPT0PZ REMOVAL OF CARDIAC RHYTHM RELATED DEVICE FROM TRUNK SUBCUTANEOUS TISSUE AND FASCIA, OPEN APPROACH: ICD-10-PCS | Performed by: THORACIC SURGERY (CARDIOTHORACIC VASCULAR SURGERY)

## 2018-06-01 PROCEDURE — 02PA0MZ REMOVAL OF CARDIAC LEAD FROM HEART, OPEN APPROACH: ICD-10-PCS | Performed by: THORACIC SURGERY (CARDIOTHORACIC VASCULAR SURGERY)

## 2018-06-01 RX ADMIN — Medication PRN MG: at 23:42

## 2018-06-01 RX ADMIN — HUMAN INSULIN SCH: 100 INJECTION, SOLUTION SUBCUTANEOUS at 20:41

## 2018-06-01 RX ADMIN — Medication SCH ML: at 20:28

## 2018-06-01 RX ADMIN — HUMAN INSULIN SCH: 100 INJECTION, SOLUTION SUBCUTANEOUS at 08:00

## 2018-06-01 RX ADMIN — GUAIFENESIN PRN MG: 100 SOLUTION ORAL at 20:28

## 2018-06-01 RX ADMIN — METOCLOPRAMIDE HYDROCHLORIDE SCH MG: 10 TABLET ORAL at 10:03

## 2018-06-01 RX ADMIN — PANCRELIPASE SCH CAP: 60000; 12000; 38000 CAPSULE, DELAYED RELEASE PELLETS ORAL at 13:30

## 2018-06-01 RX ADMIN — PANTOPRAZOLE SODIUM SCH MG: 20 TABLET, DELAYED RELEASE ORAL at 10:02

## 2018-06-01 RX ADMIN — HUMAN INSULIN SCH: 100 INJECTION, SOLUTION SUBCUTANEOUS at 12:00

## 2018-06-01 RX ADMIN — Medication SCH ML: at 09:00

## 2018-06-01 RX ADMIN — PANCRELIPASE SCH CAP: 60000; 12000; 38000 CAPSULE, DELAYED RELEASE PELLETS ORAL at 10:02

## 2018-06-01 RX ADMIN — CEFTRIAXONE SODIUM SCH MLS/HR: 2 INJECTION, POWDER, FOR SOLUTION INTRAMUSCULAR; INTRAVENOUS at 11:45

## 2018-06-01 RX ADMIN — ACYCLOVIR SCH UNITS: 800 TABLET ORAL at 09:00

## 2018-06-01 RX ADMIN — SERTRALINE HYDROCHLORIDE SCH MG: 50 TABLET, FILM COATED ORAL at 10:02

## 2018-06-01 RX ADMIN — HUMAN INSULIN SCH: 100 INJECTION, SOLUTION SUBCUTANEOUS at 17:00

## 2018-06-01 RX ADMIN — PANCRELIPASE SCH CAP: 60000; 12000; 38000 CAPSULE, DELAYED RELEASE PELLETS ORAL at 18:30

## 2018-06-01 RX ADMIN — TIOTROPIUM BROMIDE SCH MCG: 18 CAPSULE ORAL; RESPIRATORY (INHALATION) at 10:03

## 2018-06-01 RX ADMIN — ASPIRIN SCH MG: 81 TABLET ORAL at 10:02

## 2018-06-01 RX ADMIN — ACYCLOVIR SCH UNITS: 800 TABLET ORAL at 20:30

## 2018-06-01 NOTE — PD.OP
cc:   Kristina Garsia MD; Cayden Watkinssebas ARCOS DO


__________________________________________________





Operative Report


Date of Surgery:  Jun 1, 2018


Preoperative Diagnosis:  


(1) Pacemaker infection


(2) Sepsis


Postoperative Diagnosis:  


same


Procedure:


Pulse generator removal and lead extraction of atrial and ventricular leads


using [employer] laser.


Capsulectomy


Cultures


Anesthesia:


Dr. Cortes


Surgeon:


Kristina Garsia


Assistant(s):


Francine Gomez, PAC


Operation and Findings:


The patient was prepped and draped in the usual manner.  A time out was 

performed.  The prior pacemaker incision was opened sharply and electrocautery 

was used to isolate the lead and enter the pocket.  Pocket cultures were 

obtained.  The pulse generator was removed, and the leads were disconnected.  

The leads were completely isolated to their entry point toward the subclavian 

vein.  A stylet was placed in the leads, and the leads were unscrewed.  The 

ventricular lead could not be unscrewed.  Both leads were tethered and fixed.  

EZ locking stylets were used in each lead and a rail was created after cutting 

the terminal from each lead.  The spectranetBloson laser was used to mobilize the 

lead to a point under the clavicle.  At this point, a Tightrail mechanical 

device was used to free the both leads  from its attachments near the clavicle.

   The laser sheath was used with an outer sheath for support to free the 

ventricular lead to its attachment in the RV.  Upsizing to a 16 F device was 

necessary to mobilize and remove the lead from the RV distally.  The atrial 

lead was somewhat frayed and a Cook Bulldog was used to create a rail.  The 16 

F sheath was used to mobilize the atrial lead and remove it. Both leads were 

completely removed.  The capsule was excised using electrocautery and sent to 

pathology.   The wound was irrigated with vancomycin solution and checked for 

hemostasis.  The wound was closed in 2 layers and a dressing applied.  Patient 

was transported to PACU in stable condition.











Kristina Garsia MD Jun 1, 2018 16:43

## 2018-06-01 NOTE — HHI.FF
Face to Face Verification


Diagnosis:  


(1) HTN (hypertension)


(2) Pacemaker


(3) Pacemaker infection


(4) COPD (chronic obstructive pulmonary disease)


Home Health Nursing








Order: Medication education-adverse effect





 Wound care and dressing changes





 Nursing assessment with vital signs








Instructions:


Thoracic Surgery patients


Mandatory frequency


Assess and evaluation, 2-3 x a week for one week  


Initial visit


1.   Review post chest surgery instructions chest  precautions, Activity, 

Elastic hose, Incision care, Driving, Incentive spirometry, Smoking, [place]

, Work and other)


2.   Need Betadine to paint incision


3.   Medication reconciliation


4.   Importance of follow up care/ check on appointments


5.   Make calendar  record temperature daily


6.   When to call  Home nurse, review instructions, phone list


7.   Incentive Spirometry, demonstration


Visit 1- Begin discharge instruction for patient family and/ or caregiver using 

teach back method-


1.   Signs and symptoms of infection


2.   Disease characteristics


3.   Medicines and side effects


4.   Foods and nutrition/ appetite


5.   Infection control/ hand washing/ hygiene


Visit 2- Continue teaching


1.   Discharge instructions- include additional information on smoking cessation

, 


Visit 3- Continue teaching- 


1.   Cough and deep breathing, incision monitoring.


For any questions please call :  / [facility] [phone]





Incentive spirometry Q1 hr x 10, while awake, also use acapella device hourly 

whole 





Chest wall precautions: NO pushing or pulling, ( pt must use chest pillow  

support chest with all activities and with coughing 








Daily incision care:  ok to shower ( 48hrs after chest tube removed) and then 

daily, no tub bath.  Wash all incisions with liquid dial soap, clean wash cloth 

to each site, rinse and pat dry.  Observe for any signs of infection, such as 

drainage which is dark yellow, gray, green or foul smelling.  Immediately 

report to the surgeon any drainage from the chest incision, or legs, and for 

any abnormal drainage from the chest tube sites.  Notify surgeon if any temp >

101.5 degrees F.  When specialty dressing removed/ or if you do not have one, 

continue to shower daily as above, then rinse and pat incision dry and paint 

with betadine daily x 5 days.  Allow steri strips to fall off if you have any.  

Avoid lotions, creams, salves, oils, etc. for the first month





For Dr. Garsia patients , please obtain   PA & Lat CXR in 2 weeks, results to 

Dr. Garsia  ( prescription will be given) (Fax: [phone]) (Tele: [phone]) , 





F/U appointment: as per [place] instructions: PCP in 2 weeks, CV surgeon 2 weeks,  


Cardiologist  3-4 weeks





For any questions regarding incisions/ dressing / meds / post op care or above 

                            


Symptoms, 





Monday Friday 8am-5pm   Heart & Vascular Surgery Office


(  Dr. Ga & Dr. Garsia), [phone]





After Hours / Nights (5pm -8am) Weekends and Holidays 


Please call [facility] ([facility]) Charge Nurse 


[phone]











I have seen patient Martin Osuna on 6/1/18. My clinical findings support 

the need for the requested home health care services because:








 Deconditioned w/ increased weakness














I certify that my clinical findings support that this patient is homebound 

because:








 Post-op weakness

















Terwilliger,Jacqueline R. ARNP Jun 1, 2018 11:00

## 2018-06-01 NOTE — RADRPT
EXAM DATE:  6/1/2018 5:39 PM EDT

AGE/SEX:        67 years / Male



INDICATIONS:  Post pacemaker removal.



CLINICAL DATA:  This is the patient's initial encounter. Patient reports that signs and symptoms have
 been present for 1 day and indicates a pain score of Nonresponsive. 

                                                                          

MEDICAL/SURGICAL HISTORY:       .  Hypertension. Chronic obstructive pulmonary disease. Gastroesophag
eal reflux disease. Bradycardia. Pneumonia. Pancreatitis. Renal failure. Kidney stones. Diabetes. Met
hicillin-resistant Staph Aureus.  .  Laminectomy L4/5. Pacemaker implanted. Cardiac catheterization. 
Circumcision. Right hand tendon repair.



[place]      [facility] [place]  .



FINDINGS:  

There is a right jugular central venous catheter is in satisfactory position. The heart is normal in 
size. There are small bilateral effusions and mild diffuse interstitial prominence. There is no pneum
othorax.



The patient's transvenous pacer has been removed. The leads from the pacer are no longer identified. 
There are at least 2 leads which overlie the patient presumably these are outside the patient.



CONCLUSION: 

No pneumothorax identified following removal of the patient's pacer.



Electronically signed by: Kade Henley MD  6/1/2018 5:41 PM EDT

## 2018-06-01 NOTE — PD.CARD.PN
Subjective


Subjective Remarks


No events overnight





MAYI showing very small vegetation noted on aortic valve





Objective


Medications





Current Medications








 Medications


  (Trade)  Dose


 Ordered  Sig/Jean Carlos


 Route  Start Time


 Stop Time Status Last Admin


 


  (NS Flush)  2 ml  UNSCH  PRN


 IVF  5/22/18 08:00


    5/29/18 09:34


 


 


  (Ecotrin Ec)  81 mg  DAILY


 PO  5/23/18 09:00


    6/1/18 10:02


 


 


  (Ativan)  0.5 mg  Q8H  PRN


 PO  5/22/18 12:30


    5/29/18 03:12


 


 


  (Reglan)  10 mg  DAILY


 PO  5/23/18 09:00


    6/1/18 10:03


 


 


  (Creon 12-38-60)  1 cap  TIDPC


 PO  5/22/18 13:30


    6/1/18 10:02


 


 


  (Protonix)  20 mg  DAILY


 PO  5/23/18 09:00


    6/1/18 10:02


 


 


  (Zoloft)  50 mg  DAILY


 PO  5/23/18 09:00


    6/1/18 10:02


 


 


  (Pill Splitter)  1 ea  UNSCH  PRN


 OTHER  5/22/18 12:30


     


 


 


  (Roxicodone)  15 mg  Q8H  PRN


 PO  5/22/18 12:30


    6/1/18 10:01


 


 


  (Albuterol Neb)  1.25 mg  Q2HR NEB  PRN


 NEB  5/22/18 12:45


    5/26/18 21:16


 


 


  (Heparin Inj)  5,000 units  Q12HR


 SQ  5/22/18 21:00


   Future Hold 5/31/18 21:19


 


 


  (Tylenol)  650 mg  Q4H  PRN


 PO  5/22/18 13:30


     


 


 


  (Zofran  Odt)  4 mg  Q8HR  PRN


 PO  5/22/18 13:30


    5/22/18 16:58


 


 


  (Robitussin Liq)  200 mg  Q4H  PRN


 PO  5/22/18 18:30


    5/31/18 05:38


 


 


  (Morphine Inj)  0.5 mg  Q4H  PRN


 IV  5/23/18 10:30


    5/31/18 08:43


 


 


  (Melatonin)  5 mg  HS  PRN


 PO  5/23/18 21:00


    5/31/18 00:44


 


 


  (D50w (Vial) Inj)  50 ml  UNSCH  PRN


 IV PUSH  5/23/18 13:45


    6/1/18 10:05


 


 


  (Glucagon Inj)  1 mg  UNSCH  PRN


 OTHER  5/23/18 13:45


     


 


 


  (NovoLIN R


 SUPPLEMENTAL


 SCALE)  1  ACHS SLIDING  SCALE


 SQ  5/23/18 17:00


    5/31/18 21:20


 


 


 Ceftriaxone


 Sodium 2000 mg/


 Sodium Chloride  100 ml @ 


 200 mls/hr  Q24H


 IV  5/24/18 10:00


    6/1/18 11:45


 


 


  (Spiriva Inh)  18 mcg  DAILY


 INH  5/25/18 09:30


    6/1/18 10:03


 


 


  (Catapres)  0.1 mg  Q6H  PRN


 PO  5/25/18 08:15


     


 


 


  (Levemir Inj)  24 units  BID


 SQ  5/28/18 21:00


    5/31/18 21:20


 


 


  (NS Flush)  2 ml  BID


 IV FLUSH  5/31/18 21:00


    6/1/18 09:00


 


 


  (NS Flush)  2 ml  UNSCH  PRN


 IV FLUSH  5/31/18 13:30


     


 


 


 Cefazolin Sodium


 500 mg/Sodium


 Chloride  505 ml @ 0


 mls/hr  ON  CALL


 IRRIGATION  5/31/18 13:30


 6/7/18 13:29   


 


 


 Cefazolin Sodium/


 Dextrose  50 ml @ 


 100 mls/hr  ON  CALL


 IV  5/31/18 13:30


 6/7/18 13:29   


 


 


  (Hibiclens 4%


 Top Soln)  1 applic  ON  CALL


 TOPICAL  5/31/18 13:30


 6/7/18 13:29   


 








Vital Signs / I&O





Vital Signs








  Date Time  Temp Pulse Resp B/P (MAP) Pulse Ox O2 Delivery O2 Flow Rate FiO2


 


6/1/18 07:50 98.0 72 16 125/74 (91) 98   


 


6/1/18 07:30     96 Nasal Cannula 2.00 


 


6/1/18 07:00  68      


 


6/1/18 04:00  69      


 


6/1/18 00:00      Room Air  21


 


6/1/18 00:00 97.9 78 18 141/87 (105) 97   


 


6/1/18 00:00  71      


 


5/31/18 23:23   16     


 


5/31/18 19:00 98.1 78 20 143/92 (109) 96   


 


5/31/18 19:00  72      


 


5/31/18 19:00      Room Air  21


 


5/31/18 18:00  80      


 


5/31/18 17:00  70      


 


5/31/18 16:00  70      


 


5/31/18 15:00 97.8 77 15 149/97 (114) 97   


 


5/31/18 15:00  69      


 


5/31/18 14:00  72      


 


5/31/18 13:23 97.6 83 14 129/85 (100) 93   


 


5/31/18 13:00  66      


 


5/31/18 12:00  78      














I/O      


 


 5/31/18 5/31/18 5/31/18 6/1/18 6/1/18 6/1/18





 07:00 15:00 23:00 07:00 15:00 23:00


 


Intake Total 820 ml 100 ml 720 ml 330 ml  


 


Output Total   2000 ml 1675 ml  


 


Balance 820 ml 100 ml -1280 ml -1345 ml  


 


      


 


Intake Oral 720 ml  720 ml 330 ml  


 


IV Total 100 ml 100 ml    


 


Output Urine Total   2000 ml 1675 ml  


 


# Voids 3     








Physical Exam


GENERAL: NAD, AAOx3


SKIN: Warm and dry.


HEAD: Atraumatic. Normocephalic. 


EYES: Pupils equal and round. No scleral icterus. No injection or drainage. 


ENT: No nasal bleeding or discharge.  Mucous membranes pink and moist.


NECK: Trachea midline. No JVD. 


CARDIOVASCULAR: Regular rate and rhythm.  


RESPIRATORY: No accessory muscle use. Clear to auscultation. Breath sounds 

equal bilaterally. 


GASTROINTESTINAL: Abdomen soft, non-tender, nondistended. Hepatic and splenic 

margins not palpable. 


MUSCULOSKELETAL: Extremities without clubbing, cyanosis, or edema. No obvious 

deformities. 


NEUROLOGICAL: Awake and alert. No obvious cranial nerve deficits.  Motor 

grossly within normal limits. Five out of 5 muscle strength in the arms and 

legs.  Normal speech.


PSYCHIATRIC: Appropriate mood and affect; insight and judgment normal.


Laboratory





Laboratory Tests








Test


  5/31/18


16:34 6/1/18


05:31 6/1/18


05:36


 


Prothrombin Time 10.2 SEC   10.0 SEC 


 


Prothromb Time International


Ratio 1.0 RATIO 


  


  1.0 RATIO 


 


 


White Blood Count  6.8 TH/MM3  


 


Red Blood Count  5.19 MIL/MM3  


 


Hemoglobin  15.8 GM/DL  


 


Hematocrit  46.3 %  


 


Mean Corpuscular Volume  89.2 FL  


 


Mean Corpuscular Hemoglobin  30.4 PG  


 


Mean Corpuscular Hemoglobin


Concent 


  34.1 % 


  


 


 


Red Cell Distribution Width  13.6 %  


 


Platelet Count  257 TH/MM3  


 


Mean Platelet Volume  8.2 FL  


 


Neutrophils (%) (Auto)  64.7 %  


 


Lymphocytes (%) (Auto)  19.7 %  


 


Monocytes (%) (Auto)  11.9 %  


 


Eosinophils (%) (Auto)  3.2 %  


 


Basophils (%) (Auto)  0.5 %  


 


Neutrophils # (Auto)  4.4 TH/MM3  


 


Lymphocytes # (Auto)  1.3 TH/MM3  


 


Monocytes # (Auto)  0.8 TH/MM3  


 


Eosinophils # (Auto)  0.2 TH/MM3  


 


Basophils # (Auto)  0.0 TH/MM3  


 


CBC Comment  DIFF FINAL  


 


Differential Comment    


 


Blood Urea Nitrogen   18 MG/DL 


 


Creatinine   1.04 MG/DL 


 


Random Glucose   60 MG/DL 


 


Total Protein   8.1 GM/DL 


 


Albumin   3.2 GM/DL 


 


Calcium Level   8.8 MG/DL 


 


Phosphorus Level   3.6 MG/DL 


 


Magnesium Level   2.1 MG/DL 


 


Alkaline Phosphatase   141 U/L 


 


Aspartate Amino Transf


(AST/SGOT) 


  


  27 U/L 


 


 


Alanine Aminotransferase


(ALT/SGPT) 


  


  31 U/L 


 


 


Total Bilirubin   0.6 MG/DL 


 


Sodium Level   132 MEQ/L 


 


Potassium Level   4.3 MEQ/L 


 


Chloride Level   94 MEQ/L 


 


Carbon Dioxide Level   26.2 MEQ/L 


 


Anion Gap   12 MEQ/L 


 


Estimat Glomerular Filtration


Rate 


  


  86 ML/MIN 


 











Assessment and Plan


Problem List:  


(1) Sepsis


ICD Codes:  A41.9 - Sepsis, unspecified organism


Status:  Acute


(2) Polysubstance abuse


ICD Codes:  F19.10 - Other psychoactive substance abuse, uncomplicated


Status:  Acute


(3) HTN (hypertension)


ICD Codes:  I10 - Hypertension


Status:  Chronic


(4) DKA (diabetic ketoacidoses)


ICD Codes:  E13.10 - Other specified diabetes mellitus with ketoacidosis 

without coma


Status:  Acute


(5) COPD (chronic obstructive pulmonary disease)


ICD Codes:  J44.9 - Chronic obstructive pulmonary disease


Status:  Chronic


(6) Cocaine abuse


ICD Codes:  F14.10 - Cocaine abuse


Status:  Acute


(7) Pacemaker


ICD Codes:  Z95.0 - Presence of cardiac pacemaker


Status:  Chronic


Assessment and Plan


1) Bacteremia with Group B Strep


   MAYI showing very small vegetation noted on aortic valve


   No other vegetations noted


2) PPM Interrogation showing intrinsic rhythm sinus 60-70


   Discussed with Dr. Garsia, PPM removal today


   No current need for PPM


3) Atypical CP


   Trops negative


   Possible due to his PNA


4) Cocaine cessation


5) Dr. Medina will be available PRN over the weekend





Problem Qualifiers





(1) Sepsis:  


Qualified Codes:  A41.9 - Sepsis, unspecified organism








Prabhu Watkins DO Jun 1, 2018 11:52

## 2018-06-01 NOTE — HHI.PR
Subjective


Remarks


We will consult on call cardiology for MAYI since has not seen Dr. Calzada in a 

very long time and she states that he is not her patient


Await cardiac evaluation for MAYI


Discussed with patient and RN


Patient has already ate today so no procedures would be done until tomorrow at 

the earliest








5-30 HAD MAYI 


POSITIVE FOR ENDOCARDITIS


WILL NEED ANTIBIOTICS AND WILL NEED PACEMAKER REMOVED PER DISCUSSION WITH ID 

AND CARDIOLOGY


DW RN AND PT AND CM


ANTIBIOTICS PER ID





5-31 PATIENT STATES HE IS TO HAVE AICD/PPM REMOVED TOMORROW


DW RN AND PT AND CM


CONTINUE ANTIBIOTICS FOR ENDOCARDITIS- WILL NEED DEVICE REMOVED


AM LABS





6-1 PATIENT TO HAVE PPM REMOVED TODAY 


WILL STILL NEED 6 WEEKS OF IV ANTIBIOTICS


DW RN AND PT AND CM AND ID AND [facility] AND CARDIOLOGY


AM LABS





Objective


Vitals





Vital Signs








  Date Time  Temp Pulse Resp B/P (MAP) Pulse Ox O2 Delivery O2 Flow Rate FiO2


 


6/1/18 13:00  83      


 


6/1/18 12:00  80      


 


6/1/18 11:05   16     


 


6/1/18 11:00 98.5 73 16 133/91 (105) 98   


 


6/1/18 11:00  68      


 


6/1/18 10:00  78      


 


6/1/18 09:00  72      


 


6/1/18 08:00  68      


 


6/1/18 07:50 98.0 72 16 125/74 (91) 98   


 


6/1/18 07:30     96 Nasal Cannula 2.00 


 


6/1/18 07:00  68      


 


6/1/18 04:00  69      


 


6/1/18 00:00      Room Air  21


 


6/1/18 00:00 97.9 78 18 141/87 (105) 97   


 


6/1/18 00:00  71      


 


5/31/18 19:00 98.1 78 20 143/92 (109) 96   


 


5/31/18 19:00  72      


 


5/31/18 19:00      Room Air  21


 


5/31/18 18:00  80      


 


5/31/18 17:00  70      


 


5/31/18 16:00  70      














I/O      


 


 5/31/18 5/31/18 5/31/18 6/1/18 6/1/18 6/1/18





 07:00 15:00 23:00 07:00 15:00 23:00


 


Intake Total 820 ml 100 ml 720 ml 330 ml  


 


Output Total   2000 ml 1675 ml  


 


Balance 820 ml 100 ml -1280 ml -1345 ml  


 


      


 


Intake Oral 720 ml  720 ml 330 ml  


 


IV Total 100 ml 100 ml    


 


Output Urine Total   2000 ml 1675 ml  


 


# Voids 3     








Result Diagram:  


6/1/18 0531                                                                    

            6/1/18 0536





Other Results





 Laboratory Tests








Test


  5/30/18


05:01 5/30/18


05:07 5/31/18


05:28 5/31/18


16:34


 


Blood Urea Nitrogen 19 MG/DL   20 MG/DL  


 


Creatinine 1.12 MG/DL   0.98 MG/DL  


 


Random Glucose 77 MG/DL   69 MG/DL  


 


Total Protein 8.2 GM/DL   7.5 GM/DL  


 


Albumin 3.5 GM/DL   3.1 GM/DL  


 


Calcium Level 9.5 MG/DL   9.1 MG/DL  


 


Phosphorus Level 3.6 MG/DL   3.6 MG/DL  


 


Magnesium Level 2.0 MG/DL   2.0 MG/DL  


 


Alkaline Phosphatase 114 U/L   124 U/L  


 


Aspartate Amino Transf


(AST/SGOT) 24 U/L 


  


  23 U/L 


  


 


 


Alanine Aminotransferase


(ALT/SGPT) 29 U/L 


  


  30 U/L 


  


 


 


Total Bilirubin 0.6 MG/DL   0.5 MG/DL  


 


Sodium Level 134 MEQ/L   133 MEQ/L  


 


Potassium Level 4.1 MEQ/L   4.2 MEQ/L  


 


Chloride Level 93 MEQ/L   96 MEQ/L  


 


Carbon Dioxide Level 30.7 MEQ/L   26.5 MEQ/L  


 


Anion Gap 10 MEQ/L   11 MEQ/L  


 


Estimat Glomerular Filtration


Rate 79 ML/MIN 


  


  92 ML/MIN 


  


 


 


Hemoglobin A1c 11.7 %    


 


Free Thyroxine 1.40 NG/DL    


 


Thyroid Stimulating Hormone


3rd Gen 3.260 uIU/ML 


  


  


  


 


 


White Blood Count  10.2 TH/MM3  7.2 TH/MM3  


 


Red Blood Count  5.36 MIL/MM3  5.04 MIL/MM3  


 


Hemoglobin  16.2 GM/DL  15.3 GM/DL  


 


Hematocrit  48.2 %  45.3 %  


 


Mean Corpuscular Volume  89.9 FL  90.0 FL  


 


Mean Corpuscular Hemoglobin  30.3 PG  30.4 PG  


 


Mean Corpuscular Hemoglobin


Concent 


  33.7 % 


  33.8 % 


  


 


 


Red Cell Distribution Width  13.5 %  13.4 %  


 


Platelet Count  267 TH/MM3  242 TH/MM3  


 


Mean Platelet Volume  8.8 FL  8.9 FL  


 


Neutrophils (%) (Auto)  48.5 %  61.2 %  


 


Lymphocytes (%) (Auto)  30.6 %  21.5 %  


 


Monocytes (%) (Auto)  14.3 %  11.7 %  


 


Eosinophils (%) (Auto)  6.2 %  5.1 %  


 


Basophils (%) (Auto)  0.4 %  0.5 %  


 


Neutrophils # (Auto)  4.9 TH/MM3  4.4 TH/MM3  


 


Lymphocytes # (Auto)  3.1 TH/MM3  1.6 TH/MM3  


 


Monocytes # (Auto)  1.5 TH/MM3  0.8 TH/MM3  


 


Eosinophils # (Auto)  0.6 TH/MM3  0.4 TH/MM3  


 


Basophils # (Auto)  0.0 TH/MM3  0.0 TH/MM3  


 


CBC Comment  DIFF FINAL  DIFF FINAL  


 


Differential Comment      


 


Prothrombin Time    10.2 SEC 


 


Prothromb Time International


Ratio 


  


  


  1.0 RATIO 


 


 


Test


  6/1/18


05:31 6/1/18


05:36 


  


 


 


White Blood Count 6.8 TH/MM3    


 


Red Blood Count 5.19 MIL/MM3    


 


Hemoglobin 15.8 GM/DL    


 


Hematocrit 46.3 %    


 


Mean Corpuscular Volume 89.2 FL    


 


Mean Corpuscular Hemoglobin 30.4 PG    


 


Mean Corpuscular Hemoglobin


Concent 34.1 % 


  


  


  


 


 


Red Cell Distribution Width 13.6 %    


 


Platelet Count 257 TH/MM3    


 


Mean Platelet Volume 8.2 FL    


 


Neutrophils (%) (Auto) 64.7 %    


 


Lymphocytes (%) (Auto) 19.7 %    


 


Monocytes (%) (Auto) 11.9 %    


 


Eosinophils (%) (Auto) 3.2 %    


 


Basophils (%) (Auto) 0.5 %    


 


Neutrophils # (Auto) 4.4 TH/MM3    


 


Lymphocytes # (Auto) 1.3 TH/MM3    


 


Monocytes # (Auto) 0.8 TH/MM3    


 


Eosinophils # (Auto) 0.2 TH/MM3    


 


Basophils # (Auto) 0.0 TH/MM3    


 


CBC Comment DIFF FINAL    


 


Differential Comment     


 


Prothrombin Time  10.0 SEC   


 


Prothromb Time International


Ratio 


  1.0 RATIO 


  


  


 


 


Blood Urea Nitrogen  18 MG/DL   


 


Creatinine  1.04 MG/DL   


 


Random Glucose  60 MG/DL   


 


Total Protein  8.1 GM/DL   


 


Albumin  3.2 GM/DL   


 


Calcium Level  8.8 MG/DL   


 


Phosphorus Level  3.6 MG/DL   


 


Magnesium Level  2.1 MG/DL   


 


Alkaline Phosphatase  141 U/L   


 


Aspartate Amino Transf


(AST/SGOT) 


  27 U/L 


  


  


 


 


Alanine Aminotransferase


(ALT/SGPT) 


  31 U/L 


  


  


 


 


Total Bilirubin  0.6 MG/DL   


 


Sodium Level  132 MEQ/L   


 


Potassium Level  4.3 MEQ/L   


 


Chloride Level  94 MEQ/L   


 


Carbon Dioxide Level  26.2 MEQ/L   


 


Anion Gap  12 MEQ/L   


 


Estimat Glomerular Filtration


Rate 


  86 ML/MIN 


  


  


 








Imaging





Last Impressions








Chest CT 5/26/18 0000 Signed





Impressions: 





 CONCLUSION:





 1.  Trace compressive/dependent atelectasis of both bases and tiny bilateral pl





 eural effusions. A slight degree of failure is conceivable. No pneumonic infilt





 rate demonstrated.





 2.  Mild emphysema and mild chronic interstitial changes are again noted. Stabl





 e, benign nodule in the right mid lung.





 3.  Stable diffuse prominent caliber of the thoracic aorta up to 4.1 cm.





  





 


 


Chest X-Ray 5/22/18 0753 Signed





Impressions: 





 CONCLUSION: No acute abnormality or significant interval change





  





 


 


Lung Scan-VQ Nuclear Medicine 5/22/18 0000 Signed





Impressions: 





 CONCLUSION: 





 1.  Low probability for PE.





 2.  Patchy uptake of tracer activity on the ventilation study suggestive of air





 space disease.





  





 


 


Lower Extremity Ultrasound 5/22/18 0000 Signed





Impressions: 





 CONCLUSION: 





 1.  No evidence of DVT.





  





 








Objective Remarks


GENERAL: Awake alert and oriented 3 talkative and cooperative


SKIN: Warm and dry.


HEAD: Atraumatic. Normocephalic. 


EYES: Pupils equal and round. No scleral icterus. No injection or drainage.  

Extraocular muscles intact


ENT: No nasal bleeding or discharge.  Mucous membranes pink and moist.  Tongue 

is midline


NECK: Trachea midline. No JVD.  Supple


CARDIOVASCULAR: Regular rate and rhythm.  S1-S2 no S3 or S4


RESPIRATORY: No accessory muscle use.  Few scattered rhonchi. Breath sounds 

equal bilaterally. 


GASTROINTESTINAL: Abdomen soft, non-tender, nondistended. Hepatic and splenic 

margins not palpable. 


MUSCULOSKELETAL: Extremities without clubbing, cyanosis, or edema. No obvious 

deformities. 


NEUROLOGICAL: Awake and alert. No obvious cranial nerve deficits.  Motor 

grossly within normal limits. Five out of 5 muscle strength in the arms and 

legs.  Normal speech.


PSYCHIATRIC: Appropriate mood and affect; insight and judgment normal.


Procedures


MAYI 5-30 SHOWED ENDOCARDITIS


Medications and IVs





Laboratory Tests








Test


  5/30/18


05:01 5/30/18


05:07 5/31/18


05:28 5/31/18


16:34


 


Blood Urea Nitrogen


  19 MG/DL


(7-18) 


  20 MG/DL


(7-18) 


 


 


Sodium Level


  134 MEQ/L


(136-145) 


  133 MEQ/L


(136-145) 


 


 


Chloride Level


  93 MEQ/L


() 


  96 MEQ/L


() 


 


 


Estimat Glomerular Filtration


Rate 79 ML/MIN


(>89) 


  


  


 


 


Hemoglobin A1c


  11.7 %


(4.3-6.0) 


  


  


 


 


Monocytes (%) (Auto)


  


  14.3 %


(0.0-8.0) 11.7 %


(0.0-8.0) 


 


 


Eosinophils (%) (Auto)


  


  6.2 %


(0.0-4.0) 5.1 %


(0.0-4.0) 


 


 


Monocytes # (Auto)


  


  1.5 TH/MM3


(0-0.9) 


  


 


 


Eosinophils # (Auto)


  


  0.6 TH/MM3


(0-0.4) 


  


 


 


Random Glucose


  


  


  69 MG/DL


() 


 


 


Albumin


  


  


  3.1 GM/DL


(3.4-5.0) 


 


 


Alkaline Phosphatase


  


  


  124 U/L


() 


 


 


Test


  6/1/18


05:31 6/1/18


05:36 


  


 


 


Monocytes (%) (Auto)


  11.9 %


(0.0-8.0) 


  


  


 


 


Random Glucose


  


  60 MG/DL


() 


  


 


 


Albumin


  


  3.2 GM/DL


(3.4-5.0) 


  


 


 


Alkaline Phosphatase


  


  141 U/L


() 


  


 


 


Sodium Level


  


  132 MEQ/L


(136-145) 


  


 


 


Chloride Level


  


  94 MEQ/L


() 


  


 


 


Estimat Glomerular Filtration


Rate 


  86 ML/MIN


(>89) 


  


 











A/P


Problem List:  


(1) Sepsis


ICD Code:  A41.9 - Sepsis, unspecified organism


Status:  Acute


Assessment and Plan


Assessment and Plan


67 years old male





Group B Strep sepsis secondary to clinical Pneumonia-- sputum MRSA


- started on Ceftriaxone 2 gm daily 5/23


- Vancomycin added 5/24


- ID ff 


- repeat blood cultures - negative so far- x 4 days


- no skin lesions/marks- denies history of IVDU- admits to smoking cocaine


- ff CBC- expect WBC to be high - on steroids


- echo  unremarkable 


- Cardiology consult for MAYI- per ID recommendations


- patient also has a PM placed in 2005- per patient has not followed with Dr. Calzada in a very long time we will consult on-call for MAYI


-MAYI POSITIVE FOR ENDOCARDITIS-- NEEDS PACEMAKER/AICD REMOVED AND ANTIBIOTICS


TO HAVE PPM/AICD  REMOVED 6-1





Acute hypoxemic respiratory failure due to COPD exacerbation- improved 

clinically


- heavy smoker in the past 


- lungs- no wheezes, + rales left base


  keep on oxygen to keep O2 sat >90%- continue with neb treatment; scheduled 


  V/Q scan negative 


- change to po steroids- prednisone  taper. DC prednisone today 5./28


- continue  home inhalers





hypertension


History of AICD


- on  lisinopril 20 mg po bid  states he was on 40 mg po bid as OP- -  will DC 

with elevated Potassium


- Clonidine prn


- consider restarting at a lower dose once K improved  or use CCB- amlodipine 


NEEDS AICD/PACEMAKER REMOVED ON 6-1








diabetes mellitus; uncontrolled-a1C 11.7


- expect to be high on steroids- completed course today 5/28


 started  on levemir  - increased dose to 24 untis  bid 24 - continue to adjust 

and monitor 


- medium sliding scale Novolog


- monitor - Prednisone- DC today 5/28


- reinforced diabetes education











Acute kidney injury on top CKD- Improved


- off IVF


- patient  with  good po intake





Hyperkalemia- on BMP today 5/28


- patient on Lisinorpil 20 mg po bid (home was on 40 mg po bid)


- HOld Lisinopril


- restart at a lower dose if K level improved  or switch to amlodipine for HTN  

if K is going to be an chronic problem


- given kayexalate 30 gm po x 2


- NS 50 cc/hr - 500 cc 


- BMP in am





History of chronic pancreatitis-


 on creon and reglan daily





chronic back pain; resume home meds.


-DVT prophylaxis with subq heparin








d/w patient - up and increase activity


Discharge Planning


Pending ID clearance and cardiac


WILL NEED PACER/AICD REMOVED DUE TO MRSA AND ENDOCARDITIS





Problem Qualifiers





(1) Sepsis:  


Qualified Codes:  A41.9 - Sepsis, unspecified organism








Ryan Funk DO Jun 1, 2018 15:47

## 2018-06-02 VITALS
TEMPERATURE: 98.5 F | SYSTOLIC BLOOD PRESSURE: 133 MMHG | DIASTOLIC BLOOD PRESSURE: 89 MMHG | OXYGEN SATURATION: 96 % | RESPIRATION RATE: 16 BRPM | HEART RATE: 83 BPM

## 2018-06-02 VITALS
RESPIRATION RATE: 18 BRPM | TEMPERATURE: 98.3 F | DIASTOLIC BLOOD PRESSURE: 85 MMHG | OXYGEN SATURATION: 97 % | HEART RATE: 75 BPM | SYSTOLIC BLOOD PRESSURE: 126 MMHG

## 2018-06-02 VITALS
OXYGEN SATURATION: 98 % | SYSTOLIC BLOOD PRESSURE: 144 MMHG | DIASTOLIC BLOOD PRESSURE: 85 MMHG | TEMPERATURE: 98.2 F | RESPIRATION RATE: 18 BRPM | HEART RATE: 82 BPM

## 2018-06-02 VITALS
TEMPERATURE: 98.1 F | SYSTOLIC BLOOD PRESSURE: 140 MMHG | DIASTOLIC BLOOD PRESSURE: 88 MMHG | HEART RATE: 84 BPM | OXYGEN SATURATION: 98 % | RESPIRATION RATE: 16 BRPM

## 2018-06-02 VITALS
DIASTOLIC BLOOD PRESSURE: 78 MMHG | RESPIRATION RATE: 20 BRPM | OXYGEN SATURATION: 97 % | TEMPERATURE: 98.8 F | SYSTOLIC BLOOD PRESSURE: 125 MMHG | HEART RATE: 82 BPM

## 2018-06-02 VITALS — HEART RATE: 86 BPM

## 2018-06-02 VITALS — HEART RATE: 88 BPM

## 2018-06-02 VITALS — HEART RATE: 89 BPM

## 2018-06-02 VITALS — OXYGEN SATURATION: 97 %

## 2018-06-02 VITALS
RESPIRATION RATE: 18 BRPM | OXYGEN SATURATION: 97 % | HEART RATE: 75 BPM | SYSTOLIC BLOOD PRESSURE: 134 MMHG | DIASTOLIC BLOOD PRESSURE: 88 MMHG | TEMPERATURE: 98.6 F

## 2018-06-02 VITALS — HEART RATE: 80 BPM

## 2018-06-02 VITALS — HEART RATE: 98 BPM

## 2018-06-02 VITALS — HEART RATE: 74 BPM

## 2018-06-02 VITALS — HEART RATE: 78 BPM

## 2018-06-02 VITALS — HEART RATE: 84 BPM

## 2018-06-02 VITALS — HEART RATE: 76 BPM

## 2018-06-02 VITALS — OXYGEN SATURATION: 93 %

## 2018-06-02 VITALS — HEART RATE: 90 BPM

## 2018-06-02 LAB
ALBUMIN SERPL-MCNC: 3 GM/DL (ref 3.4–5)
ALP SERPL-CCNC: 167 U/L (ref 45–117)
ALT SERPL-CCNC: 31 U/L (ref 12–78)
AST SERPL-CCNC: 24 U/L (ref 15–37)
BASOPHILS # BLD AUTO: 0 TH/MM3 (ref 0–0.2)
BASOPHILS NFR BLD: 0.1 % (ref 0–2)
BILIRUB SERPL-MCNC: 0.8 MG/DL (ref 0.2–1)
BUN SERPL-MCNC: 22 MG/DL (ref 7–18)
BUN SERPL-MCNC: 25 MG/DL (ref 7–18)
CALCIUM SERPL-MCNC: 7.8 MG/DL (ref 8.5–10.1)
CALCIUM SERPL-MCNC: 7.9 MG/DL (ref 8.5–10.1)
CHLORIDE SERPL-SCNC: 90 MEQ/L (ref 98–107)
CHLORIDE SERPL-SCNC: 92 MEQ/L (ref 98–107)
CREAT SERPL-MCNC: 1.09 MG/DL (ref 0.6–1.3)
CREAT SERPL-MCNC: 1.33 MG/DL (ref 0.6–1.3)
EOSINOPHIL # BLD: 0 TH/MM3 (ref 0–0.4)
EOSINOPHIL NFR BLD: 0 % (ref 0–4)
ERYTHROCYTE [DISTWIDTH] IN BLOOD BY AUTOMATED COUNT: 13.4 % (ref 11.6–17.2)
GFR SERPLBLD BASED ON 1.73 SQ M-ARVRAT: 65 ML/MIN (ref 89–?)
GFR SERPLBLD BASED ON 1.73 SQ M-ARVRAT: 82 ML/MIN (ref 89–?)
GLUCOSE SERPL-MCNC: 145 MG/DL (ref 74–106)
GLUCOSE SERPL-MCNC: 226 MG/DL (ref 74–106)
HCO3 BLD-SCNC: 25.2 MEQ/L (ref 21–32)
HCO3 BLD-SCNC: 26.7 MEQ/L (ref 21–32)
HCT VFR BLD CALC: 42.6 % (ref 39–51)
HGB BLD-MCNC: 14.3 GM/DL (ref 13–17)
LYMPHOCYTES # BLD AUTO: 1 TH/MM3 (ref 1–4.8)
LYMPHOCYTES NFR BLD AUTO: 7 % (ref 9–44)
MAGNESIUM SERPL-MCNC: 2 MG/DL (ref 1.5–2.5)
MCH RBC QN AUTO: 30.2 PG (ref 27–34)
MCHC RBC AUTO-ENTMCNC: 33.6 % (ref 32–36)
MCV RBC AUTO: 89.8 FL (ref 80–100)
MONOCYTE #: 0.9 TH/MM3 (ref 0–0.9)
MONOCYTES NFR BLD: 6.2 % (ref 0–8)
NEUTROPHILS # BLD AUTO: 12.3 TH/MM3 (ref 1.8–7.7)
NEUTROPHILS NFR BLD AUTO: 86.7 % (ref 16–70)
PHOSPHATE SERPL-MCNC: 1.9 MG/DL (ref 2.5–4.9)
PLATELET # BLD: 232 TH/MM3 (ref 150–450)
PMV BLD AUTO: 8.4 FL (ref 7–11)
PROT SERPL-MCNC: 7.5 GM/DL (ref 6.4–8.2)
RBC # BLD AUTO: 4.75 MIL/MM3 (ref 4.5–5.9)
SODIUM SERPL-SCNC: 127 MEQ/L (ref 136–145)
SODIUM SERPL-SCNC: 132 MEQ/L (ref 136–145)
WBC # BLD AUTO: 14.2 TH/MM3 (ref 4–11)

## 2018-06-02 RX ADMIN — Medication SCH ML: at 08:56

## 2018-06-02 RX ADMIN — HUMAN INSULIN SCH: 100 INJECTION, SOLUTION SUBCUTANEOUS at 07:58

## 2018-06-02 RX ADMIN — METOCLOPRAMIDE HYDROCHLORIDE SCH MG: 10 TABLET ORAL at 08:56

## 2018-06-02 RX ADMIN — PANTOPRAZOLE SODIUM SCH MG: 20 TABLET, DELAYED RELEASE ORAL at 08:56

## 2018-06-02 RX ADMIN — Medication SCH ML: at 21:01

## 2018-06-02 RX ADMIN — ACYCLOVIR SCH UNITS: 800 TABLET ORAL at 21:01

## 2018-06-02 RX ADMIN — HUMAN INSULIN SCH: 100 INJECTION, SOLUTION SUBCUTANEOUS at 21:02

## 2018-06-02 RX ADMIN — PANCRELIPASE SCH CAP: 60000; 12000; 38000 CAPSULE, DELAYED RELEASE PELLETS ORAL at 17:38

## 2018-06-02 RX ADMIN — TIOTROPIUM BROMIDE SCH MCG: 18 CAPSULE ORAL; RESPIRATORY (INHALATION) at 08:57

## 2018-06-02 RX ADMIN — HUMAN INSULIN SCH: 100 INJECTION, SOLUTION SUBCUTANEOUS at 12:00

## 2018-06-02 RX ADMIN — CEFTRIAXONE SODIUM SCH MLS/HR: 2 INJECTION, POWDER, FOR SOLUTION INTRAMUSCULAR; INTRAVENOUS at 10:00

## 2018-06-02 RX ADMIN — PANCRELIPASE SCH CAP: 60000; 12000; 38000 CAPSULE, DELAYED RELEASE PELLETS ORAL at 08:56

## 2018-06-02 RX ADMIN — HUMAN INSULIN SCH: 100 INJECTION, SOLUTION SUBCUTANEOUS at 17:00

## 2018-06-02 RX ADMIN — ACYCLOVIR SCH UNITS: 800 TABLET ORAL at 08:56

## 2018-06-02 RX ADMIN — PANCRELIPASE SCH CAP: 60000; 12000; 38000 CAPSULE, DELAYED RELEASE PELLETS ORAL at 12:14

## 2018-06-02 RX ADMIN — ASPIRIN SCH MG: 81 TABLET ORAL at 08:56

## 2018-06-02 RX ADMIN — SERTRALINE HYDROCHLORIDE SCH MG: 50 TABLET, FILM COATED ORAL at 08:56

## 2018-06-02 RX ADMIN — ONDANSETRON PRN MG: 4 TABLET, ORALLY DISINTEGRATING ORAL at 05:31

## 2018-06-02 NOTE — HHI.PR
Subjective


Remarks


Pt seen and examined. AFVSS. No acute events overnight. Pacemaker removed 

yesterday. No issues with pain or bleeding. No acute concerns. Denies CP, SOB, 

abdominal pain, N/V.





Objective





Vital Signs








  Date Time  Temp Pulse Resp B/P (MAP) Pulse Ox O2 Delivery O2 Flow Rate FiO2


 


6/2/18 12:00  90      


 


6/2/18 11:10 98.5 83 16 133/89 (104) 96   


 


6/2/18 11:00  90      


 


6/2/18 10:00  98      


 


6/2/18 09:00  86      


 


6/2/18 08:00  78      


 


6/2/18 07:47     93   21


 


6/2/18 07:16     98 Nasal Cannula 2.00 


 


6/2/18 07:16  81      


 


6/2/18 07:16 98.2 82 18 144/85 (104) 98   


 


6/2/18 06:00  89      


 


6/2/18 05:00  74      


 


6/2/18 04:00  75      


 


6/2/18 04:00      Nasal Cannula 2.00 


 


6/2/18 04:00 98.3 75 18 126/85 (99) 97   


 


6/2/18 03:00  88      


 


6/2/18 02:00  86      


 


6/2/18 01:00  78      


 


6/2/18 00:00 98.6 75 18 134/88 (103) 97   


 


6/2/18 00:00  75      


 


6/2/18 00:00      Nasal Cannula 2.00 


 


6/1/18 23:00  78      


 


6/1/18 22:00  90      


 


6/1/18 21:32     97 Nasal Cannula 2.00 


 


6/1/18 21:00  88      


 


6/1/18 20:00 98.3 84 20 128/81 (97) 96   


 


6/1/18 20:00      Nasal Cannula 2.00 


 


6/1/18 20:00  84      


 


6/1/18 18:00 98.1 77 18 135/81 (99) 98   


 


6/1/18 18:00  72 12 127/79 (95) 97 Nasal Cannula 2 


 


6/1/18 18:00  77      


 


6/1/18 17:45  71 19 139/80 (99) 97 Nasal Cannula 2 


 


6/1/18 17:30  69 16 139/86 (103) 98 Nasal Cannula 2 


 


6/1/18 17:12 97.0 72 19 141/84 (103) 98 Nasal Cannula 2 


 


6/1/18 14:10        21














I/O      


 


 6/1/18 6/1/18 6/1/18 6/2/18 6/2/18 6/2/18





 07:00 15:00 23:00 07:00 15:00 23:00


 


Intake Total 330 ml  1640 ml 480 ml 360 ml 


 


Output Total 1675 ml  675 ml 1300 ml  


 


Balance -1345 ml  965 ml -820 ml 360 ml 


 


      


 


Intake Oral 330 ml  240 ml 480 ml  


 


IV Total     360 ml 


 


Other   1400 ml   


 


Output Urine Total 1675 ml  650 ml 1300 ml  


 


Estimated Blood Loss   25 ml   


 


# Bowel Movements    1  








Result Diagram:  


6/2/18 0445                                                                    

            6/2/18 0445





Imaging











Chest X-Ray 6/1/18 0000 Signed





Impressions: 





 CONCLUSION: 





 No pneumothorax identified following removal of the patient's pacer.





  





 


 


Chest CT 5/26/18 0000 Signed





Impressions: 





 CONCLUSION:





 1.  Trace compressive/dependent atelectasis of both bases and tiny bilateral pl





 eural effusions. A slight degree of failure is conceivable. No pneumonic infilt





 rate demonstrated.





 2.  Mild emphysema and mild chronic interstitial changes are again noted. Stabl





 e, benign nodule in the right mid lung.





 3.  Stable diffuse prominent caliber of the thoracic aorta up to 4.1 cm.





  





 


 


Lung Scan-VQ Nuclear Medicine 5/22/18 0000 Signed





Impressions: 





 CONCLUSION: 





 1.  Low probability for PE.





 2.  Patchy uptake of tracer activity on the ventilation study suggestive of air





 space disease.





  





 


 


Lower Extremity Ultrasound 5/22/18 0000 Signed





Impressions: 





 CONCLUSION: 





 1.  No evidence of DVT.





  





 








Procedures


Pacemaker removed 6/1


Objective Remarks


GENERAL: WN, WD AA male resting in bed in NAD.


SKIN: Warm and dry.


HEENT: AT/NC. Pupils equal and round. MMM.


NECK: R IJ line in place with no surrounding erythema.


CHEST: Pressure dressing over L upper chest


HEART: RRR with 1/6 FRITZ.


LUNGS: CTAB without wheezes or crackles.


ABDOMEN: +BS, soft, NT, ND.


EXTREMITIES: No LE edema. .


NEURO: Awake and alert. 


PSYCH: Appropriate mood and affect.





A/P


Assessment and Plan


67 years old AA male with poorly controlled DM, HTN, COPD, and depression 

admitted on 5/22 for severe sepsis and COPD exacerbation with suspected 

pneumonia and over his clinical course was found to be bacteremic with AV 

vegetation.





1. GBS bacteremia


- BCtx from 5/22 growing GBS


- Repeat BC NGTD


- s/p pacemaker removed 6/1


- ID following, recommend Rocephin 2 g daily x 6 weeks


- F/U pacemaker culture


- PICC line ordered


- OP antibiotic infusion to be done by ID





2. Aortic valve endocarditis


- 2D echo unremarkable


- MAYI with small vegetation of aortic valve


- ID and cardiology following





3. Leukocytosis


- White count up to 14K today


- Likely due to systemic steroids patient was on


- Afebrile and no signs of new infection


- Continue to monitor clinically





4. Hyperkalemia


- Early AM labs notable for sodium 127 and potassium 6.6 and overnight provider 

ordered one time calcium gluconate and insulin with dextrose


- Stat repeat today showed potassium 4.7


- Continue to monitor


- Avoid Ace-inhibitors





5. Hyponatremia


- Sodium was 127 this morning and repeat was 132


- Continue to monitor





6. COPD exacerbation


- Acute exacerbation resolved


- Supplemental O2


- DuoNebs


- Continue home inhalers





7. HTN


- Stable


- Clonidine PRN





8. DM


- Uncontrolled, A1c 11.7


- Levemir 24 units BID, adjust and monitor 


- SSI per protocol





9. Depression


- Continue home Zoloft 





10. Chronic pancreatitis


- Continue Creon and Reglan





11. Chronic back pain


- Continue home oxycodone PRN





DVT prophylaxis: Heparin


Discharge Planning


Anticipate D/C tomorrow if cleared by CT surgery and ID











Teresa Velasquez MD Jun 2, 2018 13:55

## 2018-06-02 NOTE — HHI.IDPN
Subjective


Subjective


Remarks


afebrile


MAYI showed small vegetation on aortic valve 


S?p removal of AICD - uneventful


WBC  up to 14 K


Antibiotics


CFTX


Allergies:  


Coded Allergies:  


     No Known Drug Allergies (Verified  Allergy, Unknown, 5/22/18)


     MRI PRECAUTION (Verified  Adverse Reaction, Severe, PACEMAKER, 5/22/18)


 PT HAS PACEMAKER PER [facility]


     *MDRO Multi-Drug Resistant Organism (Verified  Adverse Reaction, Unknown, 

Cleared - 5/24/16, 5/22/18)


 MRSA PCR (nares) negative - 5/21/16 & 5/24/16


 ***Cleared per Infection Control***


 


 MRSA (blood and urine) - 2/2013





Objective


.





Vital Signs








  Date Time  Temp Pulse Resp B/P (MAP) Pulse Ox O2 Delivery O2 Flow Rate FiO2


 


6/2/18 12:00  90      


 


6/2/18 11:10 98.5 83 16 133/89 (104) 96   


 


6/2/18 11:00  90      


 


6/2/18 10:00  98      


 


6/2/18 09:00  86      


 


6/2/18 08:00  78      


 


6/2/18 07:47     93   21


 


6/2/18 07:16     98 Nasal Cannula 2.00 


 


6/2/18 07:16  81      


 


6/2/18 07:16 98.2 82 18 144/85 (104) 98   


 


6/2/18 06:00  89      


 


6/2/18 05:00  74      


 


6/2/18 04:00  75      


 


6/2/18 04:00      Nasal Cannula 2.00 


 


6/2/18 04:00 98.3 75 18 126/85 (99) 97   


 


6/2/18 03:00  88      


 


6/2/18 02:00  86      


 


6/2/18 01:00  78      


 


6/2/18 00:00 98.6 75 18 134/88 (103) 97   


 


6/2/18 00:00  75      


 


6/2/18 00:00      Nasal Cannula 2.00 


 


6/1/18 23:00  78      


 


6/1/18 22:00  90      


 


6/1/18 21:32     97 Nasal Cannula 2.00 


 


6/1/18 21:00  88      


 


6/1/18 20:00 98.3 84 20 128/81 (97) 96   


 


6/1/18 20:00      Nasal Cannula 2.00 


 


6/1/18 20:00  84      


 


6/1/18 18:00 98.1 77 18 135/81 (99) 98   


 


6/1/18 18:00  72 12 127/79 (95) 97 Nasal Cannula 2 


 


6/1/18 18:00  77      


 


6/1/18 17:45  71 19 139/80 (99) 97 Nasal Cannula 2 


 


6/1/18 17:30  69 16 139/86 (103) 98 Nasal Cannula 2 


 


6/1/18 17:12 97.0 72 19 141/84 (103) 98 Nasal Cannula 2 














 6/2/18 6/2/18 6/3/18





 15:00 23:00 07:00


 


Intake Total 360 ml  


 


Balance 360 ml  


 


   


 


IV Total 360 ml  








.





Laboratory Tests








Test


  6/1/18


05:31 6/2/18


04:45


 


White Blood Count 6.8 TH/MM3  14.2 TH/MM3 


 


Red Blood Count 5.19 MIL/MM3  4.75 MIL/MM3 


 


Hemoglobin 15.8 GM/DL  14.3 GM/DL 


 


Hematocrit 46.3 %  42.6 % 


 


Mean Corpuscular Volume 89.2 FL  89.8 FL 


 


Mean Corpuscular Hemoglobin 30.4 PG  30.2 PG 


 


Mean Corpuscular Hemoglobin


Concent 34.1 % 


  33.6 % 


 


 


Red Cell Distribution Width 13.6 %  13.4 % 


 


Platelet Count 257 TH/MM3  232 TH/MM3 


 


Mean Platelet Volume 8.2 FL  8.4 FL 


 


Neutrophils (%) (Auto) 64.7 %  86.7 % 


 


Lymphocytes (%) (Auto) 19.7 %  7.0 % 


 


Monocytes (%) (Auto) 11.9 %  6.2 % 


 


Eosinophils (%) (Auto) 3.2 %  0.0 % 


 


Basophils (%) (Auto) 0.5 %  0.1 % 


 


Neutrophils # (Auto) 4.4 TH/MM3  12.3 TH/MM3 


 


Lymphocytes # (Auto) 1.3 TH/MM3  1.0 TH/MM3 


 


Monocytes # (Auto) 0.8 TH/MM3  0.9 TH/MM3 


 


Eosinophils # (Auto) 0.2 TH/MM3  0.0 TH/MM3 


 


Basophils # (Auto) 0.0 TH/MM3  0.0 TH/MM3 


 


CBC Comment DIFF FINAL  DIFF FINAL 


 


Differential Comment    








Laboratory Tests








Test


  6/1/18


05:36 6/2/18


04:45 6/2/18


14:02


 


Blood Urea Nitrogen 18 MG/DL  22 MG/DL  


 


Creatinine 1.04 MG/DL  1.33 MG/DL  


 


Random Glucose 60 MG/DL  226 MG/DL  


 


Total Protein 8.1 GM/DL  7.5 GM/DL  


 


Albumin 3.2 GM/DL  3.0 GM/DL  


 


Calcium Level 8.8 MG/DL  7.9 MG/DL  


 


Phosphorus Level 3.6 MG/DL  1.9 MG/DL  


 


Magnesium Level 2.1 MG/DL  2.0 MG/DL  


 


Alkaline Phosphatase 141 U/L  167 U/L  


 


Aspartate Amino Transf


(AST/SGOT) 27 U/L 


  24 U/L 


  


 


 


Alanine Aminotransferase


(ALT/SGPT) 31 U/L 


  31 U/L 


  


 


 


Total Bilirubin 0.6 MG/DL  0.8 MG/DL  


 


Sodium Level 132 MEQ/L  127 MEQ/L  


 


Potassium Level 4.3 MEQ/L  6.6 MEQ/L  


 


Chloride Level 94 MEQ/L  90 MEQ/L  


 


Carbon Dioxide Level 26.2 MEQ/L  26.7 MEQ/L  


 


Anion Gap 12 MEQ/L  10 MEQ/L  


 


Estimat Glomerular Filtration


Rate 86 ML/MIN 


  65 ML/MIN 


  


 








Microbiology








 Date/Time


Source Procedure


Growth Status


 


 


 6/1/18 14:50


Wound Chest Gram Stain - Final Resulted


 


 6/1/18 14:50


Wound Chest Wound Culture


Pending Resulted





 6/1/18 14:50


Other Fungal Smear - Final Resulted


 


 6/1/18 14:50


Other Fungal Culture


Pending Resulted





 6/1/18 14:50


Other Acid Fast Stain


Pending Received


 


 6/1/18 14:50


Other Mycobacterial Culture


Pending Received








Imaging


  


Last Impressions








Chest CT 5/26/18 0000 Signed





Impressions: 





 CONCLUSION:





 1.  Trace compressive/dependent atelectasis of both bases and tiny bilateral pl





 eural effusions. A slight degree of failure is conceivable. No pneumonic infilt





 rate demonstrated.





 2.  Mild emphysema and mild chronic interstitial changes are again noted. Stabl





 e, benign nodule in the right mid lung.





 3.  Stable diffuse prominent caliber of the thoracic aorta up to 4.1 cm.





  





 


 


Chest X-Ray 5/22/18 0753 Signed





Impressions: 





 CONCLUSION: No acute abnormality or significant interval change





  





 


 


Lung Scan-[facility] 5/22/18 0000 Signed





Impressions: 





 CONCLUSION: 





 1.  Low probability for PE.





 2.  Patchy uptake of tracer activity on the ventilation study suggestive of air





 space disease.





  





 


 


Lower Extremity Ultrasound 5/22/18 0000 Signed





Impressions: 





 CONCLUSION: 





 1.  No evidence of DVT.





  





 








Physical Exam


CONSTITUTIONAL/GENERAL: This is an adequately nourished patient, in no apparent 

distress.


TUBES/LINES/DRAINS:


SKIN: No jaundice, rashes, or lesions.   Skin temperature appropriate. Not 

diaphoretic. 


 CARDIOVASCULAR: Regular rate and rhythm without murmurs, gallops, or rubs. No 

JVD. Peripheral pulses symmetric.


Dressing intact  in place over L chest 


RESPIRATORY/CHEST: Symmetric, unlabored respirations. Clear to auscultation. 

Breath sounds equal bilaterally. No wheezes, rales, or rhonchi.  


GASTROINTESTINAL: Abdomen soft, non-tender, nondistended. No hepato-splenomegaly

, or palpable masses. No guarding. Bowel sounds present.


 MUSCULOSKELETAL: Extremities without clubbing, cyanosis, or edema. No joint 

tenderness or effusion noted. No calf tenderness. No mottling or clubbing.


 NEUROLOGICAL: Awake and alert. Motor and sensory grossly within normal limits. 

Follows commands. Clear speech Moves all extremities.


PSYCHIATRIC: No obvious anxiety/depression. no apparent hallucinations or other 

psychotic thought process.








Assessment & Plan


Remarks


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


   sp removal of pacemaker 


Aortic valve endocarditis, GBS


   - PCN LEILANI 0.06


No e/o  PNA clinically or radiologically


   - CT with fluid 


   - MRSA in sputum  cw colonisation





 cont CFTX 2 gm daily x 6 weeks


 fu  pacer clx untill final


PICC line


OPAT filled out











Katie Medina MD Jun 2, 2018 14:43

## 2018-06-02 NOTE — HHI.FF
__________________________________________________





Infusion Therapy


Location of Infusion Therapy:  [facility] IV Infusion Order


Patient Information


Patient Weight


73.5 kg


Diagnosis:  


Coded Allergies:  


     No Known Drug Allergies (Verified  Allergy, Unknown, 5/22/18)


     MRI PRECAUTION (Verified  Adverse Reaction, Severe, PACEMAKER, 5/22/18)


 PT HAS PACEMAKER PER [facility]


     *MDRO Multi-Drug Resistant Organism (Verified  Adverse Reaction, Unknown, 

Cleared - 5/24/16, 5/22/18)


 MRSA PCR (nares) negative - 5/21/16 & 5/24/16


 ***Cleared per Infection Control***


 


 MRSA (blood and urine) - 2/2013





Administer Medication


Ceftriaxone


2 grams IV


q 24 hours


Start Treatment:  Jun 2, 2018


Stop Treatment:  Jul 12, 2018





Additional Information


Venous access:  PICC Line


Additional Instructions





[x] Peripheral flush and dressing changes per protocol


[x] Implanted port and central :


* Implanted port: 10 ml Normal Saline followed by 5 ml Heparin 100 units/ml 

Heparin flush


   after each use and monthly to maintain.


   [] May leave port accessed during therapy.


   [] May leave peripheral site accessed for duration of therapy.





[x] If patient has SOB or respiratory distress, check oxygen saturation. If 

less than 90% or clinical signs of respiratory distress, administer oxygen at 2 

L/min. via nasal cannula and notify physician.





[x] Anaphylaxis/Reaction orders:


* Stop infusion.


* Keep IV line open with saline flush.


* Notify physician.


* Monitor vital signs every 15 minutes until symptoms resolve.


* Check Oxygen saturation; Oxygen at 2 L/min. via nasal cannula if less than 90%

 or clinical signs of respiratory distress.


* Administer diphenhydramine (Benadryl) 25 mg IV STAT, (unless patient has 

received as pre-med). May repeat once, if necessary.


* Solu-Cortef 250 mg IVP over 30-60 seconds, use 100 mg vials for each 

dissolution.


* Epinephrine (1mg/1 ml) 0.3 mg subcutaneously or IVP now with any signs of 

respiratory distress.


* Check with physician for new additional pre-med orders if patient is re-

challenged or re-treated.


[x] May remove PICC line when treatment complete, after confirming with 

Physician.


[x] If the patient is admitted to the hospital, the ED, or transferred via [facility]

, complete transfer form including medication reconciliation order sheet.





Laboratory Tests


Weekly Labs:  CBC w/diff, CMP











Katie Medina MD Jun 2, 2018 14:44

## 2018-06-02 NOTE — PD.CAR.PN
CVT Progress Note


CVT: POD #:  1


Subjective/Hospital Course:


No complaints today


Objective:





Vital Signs








  Date Time  Temp Pulse Resp B/P (MAP) Pulse Ox O2 Delivery O2 Flow Rate FiO2


 


6/2/18 11:10 98.5 83 16 133/89 (104) 96   


 


6/2/18 11:00  90      


 


6/2/18 10:00  98      


 


6/2/18 09:00  86      


 


6/2/18 08:00  78      


 


6/2/18 07:16     98 Nasal Cannula 2.00 


 


6/2/18 07:16  81      


 


6/2/18 07:16 98.2 82 18 144/85 (104) 98   


 


6/2/18 06:00  89      


 


6/2/18 05:00  74      


 


6/2/18 04:00  75      


 


6/2/18 04:00      Nasal Cannula 2.00 


 


6/2/18 04:00 98.3 75 18 126/85 (99) 97   


 


6/2/18 03:00  88      


 


6/2/18 02:00  86      


 


6/2/18 01:00  78      


 


6/2/18 00:00 98.6 75 18 134/88 (103) 97   


 


6/2/18 00:00  75      


 


6/2/18 00:00      Nasal Cannula 2.00 


 


6/1/18 23:00  78      


 


6/1/18 22:00  90      


 


6/1/18 21:32     97 Nasal Cannula 2.00 


 


6/1/18 21:00  88      


 


6/1/18 20:00 98.3 84 20 128/81 (97) 96   


 


6/1/18 20:00      Nasal Cannula 2.00 


 


6/1/18 20:00  84      


 


6/1/18 18:00 98.1 77 18 135/81 (99) 98   


 


6/1/18 18:00  72 12 127/79 (95) 97 Nasal Cannula 2 


 


6/1/18 18:00  77      


 


6/1/18 17:45  71 19 139/80 (99) 97 Nasal Cannula 2 


 


6/1/18 17:30  69 16 139/86 (103) 98 Nasal Cannula 2 


 


6/1/18 17:12 97.0 72 19 141/84 (103) 98 Nasal Cannula 2 


 


6/1/18 14:10        21


 


6/1/18 13:00  83      








Labs:





Laboratory Tests








Test


  6/2/18


04:45


 


White Blood Count


  14.2 TH/MM3


(4.0-11.0)


 


Red Blood Count


  4.75 MIL/MM3


(4.50-5.90)


 


Hemoglobin


  14.3 GM/DL


(13.0-17.0)


 


Hematocrit


  42.6 %


(39.0-51.0)


 


Mean Corpuscular Volume


  89.8 FL


(80.0-100.0)


 


Mean Corpuscular Hemoglobin


  30.2 PG


(27.0-34.0)


 


Mean Corpuscular Hemoglobin


Concent 33.6 %


(32.0-36.0)


 


Red Cell Distribution Width


  13.4 %


(11.6-17.2)


 


Platelet Count


  232 TH/MM3


(150-450)


 


Mean Platelet Volume


  8.4 FL


(7.0-11.0)


 


Neutrophils (%) (Auto)


  86.7 %


(16.0-70.0)


 


Lymphocytes (%) (Auto)


  7.0 %


(9.0-44.0)


 


Monocytes (%) (Auto)


  6.2 %


(0.0-8.0)


 


Eosinophils (%) (Auto)


  0.0 %


(0.0-4.0)


 


Basophils (%) (Auto)


  0.1 %


(0.0-2.0)


 


Neutrophils # (Auto)


  12.3 TH/MM3


(1.8-7.7)


 


Lymphocytes # (Auto)


  1.0 TH/MM3


(1.0-4.8)


 


Monocytes # (Auto)


  0.9 TH/MM3


(0-0.9)


 


Eosinophils # (Auto)


  0.0 TH/MM3


(0-0.4)


 


Basophils # (Auto)


  0.0 TH/MM3


(0-0.2)


 


CBC Comment DIFF FINAL 


 


Differential Comment  


 


Blood Urea Nitrogen


  22 MG/DL


(7-18)


 


Creatinine


  1.33 MG/DL


(0.60-1.30)


 


Random Glucose


  226 MG/DL


()


 


Total Protein


  7.5 GM/DL


(6.4-8.2)


 


Albumin


  3.0 GM/DL


(3.4-5.0)


 


Calcium Level


  7.9 MG/DL


(8.5-10.1)


 


Phosphorus Level


  1.9 MG/DL


(2.5-4.9)


 


Magnesium Level


  2.0 MG/DL


(1.5-2.5)


 


Alkaline Phosphatase


  167 U/L


()


 


Aspartate Amino Transf


(AST/SGOT) 24 U/L (15-37) 


 


 


Alanine Aminotransferase


(ALT/SGPT) 31 U/L (12-78) 


 


 


Total Bilirubin


  0.8 MG/DL


(0.2-1.0)


 


Sodium Level


  127 MEQ/L


(136-145)


 


Potassium Level


  6.6 MEQ/L


(3.5-5.1)


 


Chloride Level


  90 MEQ/L


()


 


Carbon Dioxide Level


  26.7 MEQ/L


(21.0-32.0)


 


Anion Gap


  10 MEQ/L


(5-15)


 


Estimat Glomerular Filtration


Rate 65 ML/MIN


(>89)








Result Diagram:  


6/2/18 0445                                                                    

            6/2/18 0445





Imaging:





Last Impressions








Chest X-Ray 6/1/18 0000 Signed





Impressions: 





 CONCLUSION: 





 No pneumothorax identified following removal of the patient's pacer.





  





 


 


Chest CT 5/26/18 0000 Signed





Impressions: 





 CONCLUSION:





 1.  Trace compressive/dependent atelectasis of both bases and tiny bilateral pl





 eural effusions. A slight degree of failure is conceivable. No pneumonic infilt





 rate demonstrated.





 2.  Mild emphysema and mild chronic interstitial changes are again noted. Stabl





 e, benign nodule in the right mid lung.





 3.  Stable diffuse prominent caliber of the thoracic aorta up to 4.1 cm.





  





 


 


Lung Scan-VQ Nuclear Medicine 5/22/18 0000 Signed





Impressions: 





 CONCLUSION: 





 1.  Low probability for PE.





 2.  Patchy uptake of tracer activity on the ventilation study suggestive of air





 space disease.





  





 


 


Lower Extremity Ultrasound 5/22/18 0000 Signed





Impressions: 





 CONCLUSION: 





 1.  No evidence of DVT.





  





 








Cardiovascular:


RRR


Telemetry:


NSR


Pulmonary:


CTA


GI/:


NABS, NT


Incision:


dry and intact


Plan:


No issues with surgical site s/p pacemaker explant and lead extraction


Hyperkalemia noted on morning BMP - actively being managed by primary service.





(1) Sepsis


(2) Polysubstance abuse


(3) HTN (hypertension)


(4) DKA (diabetic ketoacidoses)


(5) COPD (chronic obstructive pulmonary disease)


(6) Cocaine abuse


(7) Pacemaker





Problem Qualifiers





(1) Sepsis:  


Qualified Codes:  A41.9 - Sepsis, unspecified organism








Kristina Garsia MD Jun 2, 2018 12:14

## 2018-06-03 VITALS
TEMPERATURE: 98.2 F | SYSTOLIC BLOOD PRESSURE: 119 MMHG | DIASTOLIC BLOOD PRESSURE: 79 MMHG | RESPIRATION RATE: 17 BRPM | OXYGEN SATURATION: 98 % | HEART RATE: 86 BPM

## 2018-06-03 VITALS — HEART RATE: 84 BPM

## 2018-06-03 VITALS — OXYGEN SATURATION: 99 %

## 2018-06-03 VITALS — HEART RATE: 73 BPM

## 2018-06-03 VITALS
TEMPERATURE: 98.1 F | SYSTOLIC BLOOD PRESSURE: 130 MMHG | HEART RATE: 95 BPM | DIASTOLIC BLOOD PRESSURE: 89 MMHG | OXYGEN SATURATION: 97 % | RESPIRATION RATE: 20 BRPM

## 2018-06-03 VITALS — HEART RATE: 76 BPM

## 2018-06-03 VITALS — HEART RATE: 78 BPM

## 2018-06-03 VITALS
RESPIRATION RATE: 16 BRPM | DIASTOLIC BLOOD PRESSURE: 76 MMHG | OXYGEN SATURATION: 96 % | SYSTOLIC BLOOD PRESSURE: 126 MMHG | TEMPERATURE: 98.1 F | HEART RATE: 86 BPM

## 2018-06-03 VITALS — HEART RATE: 77 BPM

## 2018-06-03 VITALS
HEART RATE: 72 BPM | OXYGEN SATURATION: 96 % | TEMPERATURE: 98.8 F | SYSTOLIC BLOOD PRESSURE: 141 MMHG | DIASTOLIC BLOOD PRESSURE: 84 MMHG | RESPIRATION RATE: 20 BRPM

## 2018-06-03 VITALS
RESPIRATION RATE: 16 BRPM | SYSTOLIC BLOOD PRESSURE: 138 MMHG | HEART RATE: 86 BPM | TEMPERATURE: 98 F | DIASTOLIC BLOOD PRESSURE: 91 MMHG | OXYGEN SATURATION: 100 %

## 2018-06-03 VITALS — HEART RATE: 92 BPM

## 2018-06-03 VITALS
SYSTOLIC BLOOD PRESSURE: 115 MMHG | TEMPERATURE: 97.8 F | DIASTOLIC BLOOD PRESSURE: 57 MMHG | RESPIRATION RATE: 16 BRPM | OXYGEN SATURATION: 100 % | HEART RATE: 81 BPM

## 2018-06-03 VITALS — OXYGEN SATURATION: 96 %

## 2018-06-03 VITALS — HEART RATE: 81 BPM

## 2018-06-03 VITALS — HEART RATE: 72 BPM

## 2018-06-03 VITALS — HEART RATE: 70 BPM

## 2018-06-03 VITALS — HEART RATE: 80 BPM

## 2018-06-03 VITALS — HEART RATE: 75 BPM

## 2018-06-03 VITALS — HEART RATE: 82 BPM

## 2018-06-03 VITALS — HEART RATE: 89 BPM

## 2018-06-03 VITALS — HEART RATE: 79 BPM

## 2018-06-03 VITALS — HEART RATE: 74 BPM

## 2018-06-03 LAB
BASOPHILS # BLD AUTO: 0 TH/MM3 (ref 0–0.2)
BASOPHILS NFR BLD: 0.4 % (ref 0–2)
BUN SERPL-MCNC: 20 MG/DL (ref 7–18)
CALCIUM SERPL-MCNC: 8.3 MG/DL (ref 8.5–10.1)
CHLORIDE SERPL-SCNC: 95 MEQ/L (ref 98–107)
CREAT SERPL-MCNC: 1.04 MG/DL (ref 0.6–1.3)
EOSINOPHIL # BLD: 0.2 TH/MM3 (ref 0–0.4)
EOSINOPHIL NFR BLD: 2.1 % (ref 0–4)
ERYTHROCYTE [DISTWIDTH] IN BLOOD BY AUTOMATED COUNT: 13.5 % (ref 11.6–17.2)
GFR SERPLBLD BASED ON 1.73 SQ M-ARVRAT: 86 ML/MIN (ref 89–?)
GLUCOSE SERPL-MCNC: 167 MG/DL (ref 74–106)
HCO3 BLD-SCNC: 28.8 MEQ/L (ref 21–32)
HCT VFR BLD CALC: 36.9 % (ref 39–51)
HGB BLD-MCNC: 12.7 GM/DL (ref 13–17)
LYMPHOCYTES # BLD AUTO: 2 TH/MM3 (ref 1–4.8)
LYMPHOCYTES NFR BLD AUTO: 21.2 % (ref 9–44)
MCH RBC QN AUTO: 30.5 PG (ref 27–34)
MCHC RBC AUTO-ENTMCNC: 34.4 % (ref 32–36)
MCV RBC AUTO: 88.7 FL (ref 80–100)
MONOCYTE #: 1.2 TH/MM3 (ref 0–0.9)
MONOCYTES NFR BLD: 13.2 % (ref 0–8)
NEUTROPHILS # BLD AUTO: 5.8 TH/MM3 (ref 1.8–7.7)
NEUTROPHILS NFR BLD AUTO: 63.1 % (ref 16–70)
PLATELET # BLD: 208 TH/MM3 (ref 150–450)
PMV BLD AUTO: 8.3 FL (ref 7–11)
RBC # BLD AUTO: 4.17 MIL/MM3 (ref 4.5–5.9)
SODIUM SERPL-SCNC: 132 MEQ/L (ref 136–145)
WBC # BLD AUTO: 9.2 TH/MM3 (ref 4–11)

## 2018-06-03 RX ADMIN — SERTRALINE HYDROCHLORIDE SCH MG: 50 TABLET, FILM COATED ORAL at 09:28

## 2018-06-03 RX ADMIN — ASPIRIN SCH MG: 81 TABLET ORAL at 09:28

## 2018-06-03 RX ADMIN — Medication SCH ML: at 09:00

## 2018-06-03 RX ADMIN — ACYCLOVIR SCH UNITS: 800 TABLET ORAL at 20:16

## 2018-06-03 RX ADMIN — METOCLOPRAMIDE HYDROCHLORIDE SCH MG: 10 TABLET ORAL at 09:28

## 2018-06-03 RX ADMIN — PANCRELIPASE SCH CAP: 60000; 12000; 38000 CAPSULE, DELAYED RELEASE PELLETS ORAL at 17:43

## 2018-06-03 RX ADMIN — PANCRELIPASE SCH CAP: 60000; 12000; 38000 CAPSULE, DELAYED RELEASE PELLETS ORAL at 11:34

## 2018-06-03 RX ADMIN — HUMAN INSULIN SCH: 100 INJECTION, SOLUTION SUBCUTANEOUS at 17:00

## 2018-06-03 RX ADMIN — CEFTRIAXONE SODIUM SCH MLS/HR: 2 INJECTION, POWDER, FOR SOLUTION INTRAMUSCULAR; INTRAVENOUS at 09:32

## 2018-06-03 RX ADMIN — HUMAN INSULIN SCH: 100 INJECTION, SOLUTION SUBCUTANEOUS at 20:17

## 2018-06-03 RX ADMIN — ACYCLOVIR SCH UNITS: 800 TABLET ORAL at 09:00

## 2018-06-03 RX ADMIN — TIOTROPIUM BROMIDE SCH MCG: 18 CAPSULE ORAL; RESPIRATORY (INHALATION) at 09:29

## 2018-06-03 RX ADMIN — PANTOPRAZOLE SODIUM SCH MG: 20 TABLET, DELAYED RELEASE ORAL at 09:28

## 2018-06-03 RX ADMIN — PANCRELIPASE SCH CAP: 60000; 12000; 38000 CAPSULE, DELAYED RELEASE PELLETS ORAL at 09:28

## 2018-06-03 RX ADMIN — HUMAN INSULIN SCH: 100 INJECTION, SOLUTION SUBCUTANEOUS at 08:00

## 2018-06-03 RX ADMIN — Medication SCH ML: at 20:10

## 2018-06-03 RX ADMIN — ACETAMINOPHEN AND CODEINE PHOSPHATE PRN TAB: 300; 30 TABLET ORAL at 20:09

## 2018-06-03 RX ADMIN — HUMAN INSULIN SCH: 100 INJECTION, SOLUTION SUBCUTANEOUS at 11:34

## 2018-06-03 RX ADMIN — HEPARIN SODIUM SCH UNITS: 10000 INJECTION, SOLUTION INTRAVENOUS; SUBCUTANEOUS at 22:04

## 2018-06-03 NOTE — HHI.PR
Subjective


Remarks


Pt seen and examined. AFVSS. No acute events overnight. POD2 from pacemaker 

removal. No issues with pain or bleeding. Denies CP, SOB, abdominal pain, N/V. 

Only complaint is hiccups that have occurred periodically the last couple days. 

Discussed using chlorpromazine at a low dose but advised on possible side 

effects and he states he doesn't want to take a change. Otherwise feeling ready 

to go home.





Objective





Vital Signs








  Date Time  Temp Pulse Resp B/P (MAP) Pulse Ox O2 Delivery O2 Flow Rate FiO2


 


6/3/18 12:03 98.0 86 16 138/91 (107) 100   


 


6/3/18 12:00  70      


 


6/3/18 11:00  73      


 


6/3/18 10:26     99 Nasal Cannula 2.00 


 


6/3/18 10:00  78      


 


6/3/18 09:00  80      


 


6/3/18 08:00  74      


 


6/3/18 07:15 97.8 81 16 115/57 (76) 100   


 


6/3/18 07:15     100 Room Air  


 


6/3/18 07:00  76      


 


6/3/18 06:00  75      


 


6/3/18 05:00  77      


 


6/3/18 04:00  72      


 


6/3/18 03:52 98.8 72 20 141/84 (103) 96   


 


6/3/18 03:00  80      


 


6/3/18 02:00  81      


 


6/3/18 01:00  79      


 


6/3/18 00:00 98.2 86 17 119/79 (92) 98   


 


6/3/18 00:00  86      


 


6/2/18 23:00  84      


 


6/2/18 22:00  86      


 


6/2/18 21:51     97 Nasal Cannula 2.00 


 


6/2/18 21:00  80      


 


6/2/18 20:00      Nasal Cannula 2.00 


 


6/2/18 20:00  82      


 


6/2/18 20:00 98.8 82 20 125/78 (94) 97   


 


6/2/18 18:00  80      


 


6/2/18 17:00  80      


 


6/2/18 16:00  76      


 


6/2/18 15:00  84      


 


6/2/18 15:00 98.1 87 16 140/88 (105) 98   


 


6/2/18 14:00  76      


 


6/2/18 13:00  88      














I/O      


 


 6/2/18 6/2/18 6/2/18 6/3/18 6/3/18 6/3/18





 07:00 15:00 23:00 07:00 15:00 23:00


 


Intake Total 480 ml 360 ml 720 ml 480 ml  


 


Output Total 1300 ml  1000 ml 1900 ml  


 


Balance -820 ml 360 ml -280 ml -1420 ml  


 


      


 


Intake Oral 480 ml  720 ml 480 ml  


 


IV Total  360 ml    


 


Output Urine Total 1300 ml  1000 ml 1900 ml  


 


# Bowel Movements 1   0  








Result Diagram:  


6/3/18 0600                                                                    

            6/3/18 0600





Procedures


Pacemaker removed 6/1


Objective Remarks


GENERAL: WN, WD AA male resting in bed in NAD.


SKIN: Warm and dry.


HEENT: AT/NC. Pupils equal and round. MMM.


NECK: R IJ line in place with no surrounding erythema.


CHEST: L chest incision C/D/I.


HEART: RRR with 1/6 FRITZ.


LUNGS: CTAB without wheezes or crackles.


ABDOMEN: +BS, soft, NT, ND.


EXTREMITIES: No LE edema. .


NEURO: Awake and alert. 


PSYCH: Appropriate mood and affect.





A/P


Assessment and Plan


67 years old AA male with poorly controlled DM, HTN, COPD, and depression 

admitted on 5/22 for severe sepsis and COPD exacerbation with suspected 

pneumonia and over his clinical course was found to be bacteremic with AV 

vegetation.





1. GBS bacteremia


- BCtx from 5/22 growing GBS


- Repeat BC NGTD


- s/p pacemaker removed 6/1


- ID following, recommend Rocephin 2 g daily x 6 weeks


- Pacemaker culture NG at 48 hours


- PICC line ordered to be placed today


- OP antibiotic form completed by ID





2. Aortic valve endocarditis


- 2D echo unremarkable


- MAYI with small vegetation of aortic valve


- ID and cardiology following





3. Hyperkalemia


- Resolved


- Early AM labs on 6/2 notable for potassium 6.6 and overnight provider ordered 

one time calcium gluconate and insulin with dextrose


- Stat repeat showed potassium 4.7 and today it is normal at 4.5


- Avoid Ace-inhibitors





5. Hyponatremia


- Sodium up to 132, likely chronic


- Asymptomatic


- Avoid meds that could further worsen





6. COPD exacerbation


- Acute exacerbation resolved


- Supplemental O2


- DuoNebsPRN


- Continue home inhalers





7. HTN


- Stable


- Clonidine PRN





8. DM


- Uncontrolled, A1c 11.7


- Levemir 24 units BID 


- SSI per protocol


- Resume home Sitagliptin-metformin and Bydureon on discharge but patient will 

need outpatient follow-up to better regulate his glucose





9. Depression


- Continue home Zoloft 





10. Chronic pancreatitis


- Continue Creon and Reglan





11. Chronic back pain


- Continue home oxycodone PRN





DVT prophylaxis: Heparin


Discharge Planning


D/C when OP arrangement for IV infusion made, case management assisting











Teresa Velasquez MD Richar 3, 2018 12:48

## 2018-06-03 NOTE — HHI.FF
Face to Face Verification


Diagnosis:  


(1) Bacteremia due to group B Streptococcus


(2) Endocarditis of aortic valve


(3) Pacemaker infection


(4) Sepsis


Home Health Nursing








Order: Wound care and dressing changes





 Nursing assessment with vital signs





 IV medication administration

















I have seen patient Martin Osuna on 6/3/18. My clinical findings support 

the need for the requested home health care services because:








 Infection w/ risk of complications





 Injectable med education/admin














I certify that my clinical findings support that this patient is homebound 

because:








 Need for psychosocial assistance





 Poor cardiac reserve

















Teresa Velasquez MD Richar 3, 2018 12:39

## 2018-06-04 VITALS
HEART RATE: 72 BPM | OXYGEN SATURATION: 98 % | RESPIRATION RATE: 16 BRPM | SYSTOLIC BLOOD PRESSURE: 134 MMHG | TEMPERATURE: 97.9 F | DIASTOLIC BLOOD PRESSURE: 90 MMHG

## 2018-06-04 VITALS
SYSTOLIC BLOOD PRESSURE: 131 MMHG | HEART RATE: 71 BPM | OXYGEN SATURATION: 95 % | DIASTOLIC BLOOD PRESSURE: 87 MMHG | TEMPERATURE: 98.2 F | RESPIRATION RATE: 18 BRPM

## 2018-06-04 VITALS
OXYGEN SATURATION: 95 % | RESPIRATION RATE: 18 BRPM | SYSTOLIC BLOOD PRESSURE: 120 MMHG | HEART RATE: 74 BPM | TEMPERATURE: 98.4 F | DIASTOLIC BLOOD PRESSURE: 89 MMHG

## 2018-06-04 VITALS
OXYGEN SATURATION: 100 % | HEART RATE: 78 BPM | SYSTOLIC BLOOD PRESSURE: 128 MMHG | RESPIRATION RATE: 18 BRPM | TEMPERATURE: 98.2 F | DIASTOLIC BLOOD PRESSURE: 92 MMHG

## 2018-06-04 VITALS — HEART RATE: 90 BPM

## 2018-06-04 VITALS — HEART RATE: 78 BPM

## 2018-06-04 VITALS — HEART RATE: 74 BPM

## 2018-06-04 VITALS
OXYGEN SATURATION: 98 % | DIASTOLIC BLOOD PRESSURE: 95 MMHG | SYSTOLIC BLOOD PRESSURE: 129 MMHG | RESPIRATION RATE: 18 BRPM | TEMPERATURE: 98.4 F | HEART RATE: 65 BPM

## 2018-06-04 VITALS — HEART RATE: 82 BPM

## 2018-06-04 VITALS — HEART RATE: 68 BPM

## 2018-06-04 VITALS — RESPIRATION RATE: 22 BRPM

## 2018-06-04 VITALS — HEART RATE: 70 BPM

## 2018-06-04 VITALS — HEART RATE: 69 BPM

## 2018-06-04 VITALS — HEART RATE: 76 BPM

## 2018-06-04 VITALS — HEART RATE: 72 BPM

## 2018-06-04 VITALS — HEART RATE: 73 BPM

## 2018-06-04 RX ADMIN — METOCLOPRAMIDE HYDROCHLORIDE SCH MG: 10 TABLET ORAL at 08:36

## 2018-06-04 RX ADMIN — PANCRELIPASE SCH CAP: 60000; 12000; 38000 CAPSULE, DELAYED RELEASE PELLETS ORAL at 17:53

## 2018-06-04 RX ADMIN — HUMAN INSULIN SCH: 100 INJECTION, SOLUTION SUBCUTANEOUS at 11:49

## 2018-06-04 RX ADMIN — ACETAMINOPHEN AND CODEINE PHOSPHATE PRN TAB: 300; 30 TABLET ORAL at 11:50

## 2018-06-04 RX ADMIN — Medication SCH ML: at 08:38

## 2018-06-04 RX ADMIN — PANCRELIPASE SCH CAP: 60000; 12000; 38000 CAPSULE, DELAYED RELEASE PELLETS ORAL at 08:35

## 2018-06-04 RX ADMIN — ACYCLOVIR SCH UNITS: 800 TABLET ORAL at 08:33

## 2018-06-04 RX ADMIN — PANTOPRAZOLE SODIUM SCH MG: 20 TABLET, DELAYED RELEASE ORAL at 08:35

## 2018-06-04 RX ADMIN — PANCRELIPASE SCH CAP: 60000; 12000; 38000 CAPSULE, DELAYED RELEASE PELLETS ORAL at 11:50

## 2018-06-04 RX ADMIN — ASPIRIN SCH MG: 81 TABLET ORAL at 08:35

## 2018-06-04 RX ADMIN — SERTRALINE HYDROCHLORIDE SCH MG: 50 TABLET, FILM COATED ORAL at 08:35

## 2018-06-04 RX ADMIN — HUMAN INSULIN SCH: 100 INJECTION, SOLUTION SUBCUTANEOUS at 08:32

## 2018-06-04 RX ADMIN — TIOTROPIUM BROMIDE SCH MCG: 18 CAPSULE ORAL; RESPIRATORY (INHALATION) at 08:38

## 2018-06-04 RX ADMIN — HUMAN INSULIN SCH: 100 INJECTION, SOLUTION SUBCUTANEOUS at 16:05

## 2018-06-04 RX ADMIN — HEPARIN SODIUM SCH UNITS: 10000 INJECTION, SOLUTION INTRAVENOUS; SUBCUTANEOUS at 08:34

## 2018-06-04 RX ADMIN — CEFTRIAXONE SODIUM SCH MLS/HR: 2 INJECTION, POWDER, FOR SOLUTION INTRAMUSCULAR; INTRAVENOUS at 10:09

## 2018-06-04 NOTE — HHI.IDPN
Subjective


Subjective


Remarks


afebrile


doing well


S?p removal of AICD - uneventful


WBC  wnl


Antibiotics


CFTX


Allergies:  


Coded Allergies:  


     No Known Drug Allergies (Verified  Allergy, Unknown, 5/22/18)


     MRI PRECAUTION (Verified  Adverse Reaction, Severe, PACEMAKER, 5/22/18)


 PT HAS PACEMAKER PER [facility]


     *MDRO Multi-Drug Resistant Organism (Verified  Adverse Reaction, Unknown, 

Cleared - 5/24/16, 5/22/18)


 MRSA PCR (nares) negative - 5/21/16 & 5/24/16


 ***Cleared per Infection Control***


 


 MRSA (blood and urine) - 2/2013





Objective


.





Vital Signs








  Date Time  Temp Pulse Resp B/P (MAP) Pulse Ox O2 Delivery O2 Flow Rate FiO2


 


6/4/18 11:00  72      


 


6/4/18 10:00  70      


 


6/4/18 09:21   16     


 


6/4/18 09:00  76      


 


6/4/18 08:00 97.9 68 16 134/90 (105) 98   


 


6/4/18 08:00  72      


 


6/4/18 07:42      Room Air  


 


6/4/18 07:00  90      


 


6/4/18 06:00  70      


 


6/4/18 05:00  74      


 


6/4/18 04:00  71      


 


6/4/18 04:00      Room Air  


 


6/4/18 04:00 98.2 71 18 131/87 (102) 95   


 


6/4/18 03:00  70      


 


6/4/18 02:00  73      


 


6/4/18 01:00  69      


 


6/4/18 00:00      Room Air  


 


6/4/18 00:00  65      


 


6/4/18 00:00 98.4 65 18 129/95 (106) 98   


 


6/3/18 23:00  78      


 


6/3/18 22:00  92      


 


6/3/18 21:28     96   21


 


6/3/18 21:00  89      


 


6/3/18 20:00  95      


 


6/3/18 20:00      Room Air  


 


6/3/18 20:00 98.1 95 20 130/89 (103) 97   


 


6/3/18 18:00  82      


 


6/3/18 17:00  78      


 


6/3/18 16:00  84      


 


6/3/18 15:05 98.1 86 16 126/76 (93) 96   


 


6/3/18 15:00  84      


 


6/3/18 14:00  84      


 


6/3/18 13:00  80      








.





Laboratory Tests








Test


  6/3/18


06:00


 


White Blood Count 9.2 TH/MM3 


 


Red Blood Count 4.17 MIL/MM3 


 


Hemoglobin 12.7 GM/DL 


 


Hematocrit 36.9 % 


 


Mean Corpuscular Volume 88.7 FL 


 


Mean Corpuscular Hemoglobin 30.5 PG 


 


Mean Corpuscular Hemoglobin


Concent 34.4 % 


 


 


Red Cell Distribution Width 13.5 % 


 


Platelet Count 208 TH/MM3 


 


Mean Platelet Volume 8.3 FL 


 


Neutrophils (%) (Auto) 63.1 % 


 


Lymphocytes (%) (Auto) 21.2 % 


 


Monocytes (%) (Auto) 13.2 % 


 


Eosinophils (%) (Auto) 2.1 % 


 


Basophils (%) (Auto) 0.4 % 


 


Neutrophils # (Auto) 5.8 TH/MM3 


 


Lymphocytes # (Auto) 2.0 TH/MM3 


 


Monocytes # (Auto) 1.2 TH/MM3 


 


Eosinophils # (Auto) 0.2 TH/MM3 


 


Basophils # (Auto) 0.0 TH/MM3 


 


CBC Comment DIFF FINAL 


 


Differential Comment  








Laboratory Tests








Test


  6/2/18


14:02 6/3/18


06:00


 


Blood Urea Nitrogen 25 MG/DL  20 MG/DL 


 


Creatinine 1.09 MG/DL  1.04 MG/DL 


 


Random Glucose 145 MG/DL  167 MG/DL 


 


Calcium Level 7.8 MG/DL  8.3 MG/DL 


 


Sodium Level 132 MEQ/L  132 MEQ/L 


 


Potassium Level 4.7 MEQ/L  4.5 MEQ/L 


 


Chloride Level 92 MEQ/L  95 MEQ/L 


 


Carbon Dioxide Level 25.2 MEQ/L  28.8 MEQ/L 


 


Anion Gap 15 MEQ/L  8 MEQ/L 


 


Estimat Glomerular Filtration


Rate 82 ML/MIN 


  86 ML/MIN 


 








Microbiology








 Date/Time


Source Procedure


Growth Status


 


 


 6/1/18 14:50


Wound Chest Gram Stain - Final Complete


 


 6/1/18 14:50


Wound Chest Wound Culture - Final


NO GROWTH IN 72 HRS.--AEROBICALLY OR ... Complete





 6/1/18 14:50


Other Fungal Smear - Final Resulted


 


 6/1/18 14:50


Other Fungal Culture


Pending Resulted





 6/1/18 14:50


Other Acid Fast Stain - Final


NO ACID FAST BACILLI SEEN Resulted


 


 6/1/18 14:50


Other Mycobacterial Culture


Pending Resulted








Imaging


  


Last Impressions








Chest X-Ray 6/1/18 0000 Signed





Impressions: 





 CONCLUSION: 





 No pneumothorax identified following removal of the patient's pacer.





  





 


 


Chest CT 5/26/18 0000 Signed





Impressions: 





 CONCLUSION:





 1.  Trace compressive/dependent atelectasis of both bases and tiny bilateral pl





 eural effusions. A slight degree of failure is conceivable. No pneumonic infilt





 rate demonstrated.





 2.  Mild emphysema and mild chronic interstitial changes are again noted. Stabl





 e, benign nodule in the right mid lung.





 3.  Stable diffuse prominent caliber of the thoracic aorta up to 4.1 cm.





  





 


 


Lung Scan-VQ Nuclear Medicine 5/22/18 0000 Signed





Impressions: 





 CONCLUSION: 





 1.  Low probability for PE.





 2.  Patchy uptake of tracer activity on the ventilation study suggestive of air





 space disease.





  





 


 


Lower Extremity Ultrasound 5/22/18 0000 Signed





Impressions: 





 CONCLUSION: 





 1.  No evidence of DVT.





  





 








Physical Exam


CONSTITUTIONAL/GENERAL: This is an adequately nourished patient, in no apparent 

distress.


TUBES/LINES/DRAINS: PICC RUE


SKIN: No jaundice, rashes, or lesions.   Skin temperature appropriate. Not 

diaphoretic. 


 CARDIOVASCULAR: Regular rate and rhythm without murmurs, gallops, or rubs. No 

JVD. Peripheral pulses symmetric.


Dressing intact  in place over L chest 


RESPIRATORY/CHEST: Symmetric, unlabored respirations. Clear to auscultation. 

Breath sounds equal bilaterally. No wheezes, rales, or rhonchi.  


  MUSCULOSKELETAL: Extremities without clubbing, cyanosis, or edema. No joint 

tenderness or effusion noted. No calf tenderness. No mottling or clubbing.


 NEUROLOGICAL: Awake and alert. Motor and sensory grossly within normal limits. 

Follows commands. Clear speech Moves all extremities.


PSYCHIATRIC: No obvious anxiety/depression. no apparent hallucinations or other 

psychotic thought process.








Assessment & Plan


Remarks


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


   sp removal of pacemaker; no need of pacer 


Aortic valve endocarditis, GBS


   - PCN LEILANI 0.06


No e/o  PNA clinically or radiologically


   - CT with fluid 


   - MRSA in sputum  cw colonisation





 cont CFTX 2 gm daily x 6 weeks


  PICC line


OPAT filled out


OK to d/c 


Dw TOÑO Oshea Alexandra A. MD Jun 4, 2018 13:07

## 2018-06-04 NOTE — HHI.PR
Objective


Vitals





Vital Signs








  Date Time  Temp Pulse Resp B/P (MAP) Pulse Ox O2 Delivery O2 Flow Rate FiO2


 


6/4/18 17:00  82      


 


6/4/18 16:54   22     


 


6/4/18 16:00 98.2 78 18 128/92 (104) 100   


 


6/4/18 16:00  84      


 


6/4/18 15:00  68      


 


6/4/18 14:00  82      


 


6/4/18 13:06   16     


 


6/4/18 13:00  78      


 


6/4/18 12:00 98.4 79 18 120/89 (99) 95   


 


6/4/18 12:00  74      


 


6/4/18 11:00  72      


 


6/4/18 10:00  70      


 


6/4/18 09:00  76      


 


6/4/18 08:00 97.9 68 16 134/90 (105) 98   


 


6/4/18 08:00  72      


 


6/4/18 07:42      Room Air  


 


6/4/18 07:00  90      


 


6/4/18 06:00  70      


 


6/4/18 05:00  74      


 


6/4/18 04:00  71      


 


6/4/18 04:00      Room Air  


 


6/4/18 04:00 98.2 71 18 131/87 (102) 95   


 


6/4/18 03:00  70      


 


6/4/18 02:00  73      


 


6/4/18 01:00  69      


 


6/4/18 00:00      Room Air  


 


6/4/18 00:00  65      


 


6/4/18 00:00 98.4 65 18 129/95 (106) 98   


 


6/3/18 23:00  78      


 


6/3/18 22:00  92      


 


6/3/18 21:28     96   21


 


6/3/18 21:00  89      


 


6/3/18 20:00  95      


 


6/3/18 20:00      Room Air  


 


6/3/18 20:00 98.1 95 20 130/89 (103) 97   














I/O      


 


 6/3/18 6/3/18 6/3/18 6/4/18 6/4/18 6/4/18





 07:00 15:00 23:00 07:00 15:00 23:00


 


Intake Total 480 ml  1200 ml 720 ml  


 


Output Total 1900 ml  1000 ml 1200 ml  


 


Balance -1420 ml  200 ml -480 ml  


 


      


 


Intake Oral 480 ml  1200 ml 720 ml  


 


Output Urine Total 1900 ml  1000 ml 1200 ml  


 


# Bowel Movements 0   0  








Result Diagram:  


6/3/18 0600                                                                    

            6/3/18 0600





Procedures


MAYI 5-30 SHOWED ENDOCARDITIS





A/P


Problem List:  


(1) Sepsis


ICD Code:  A41.9 - Sepsis, unspecified organism


Status:  Acute





Problem Qualifiers





(1) Sepsis:  


Qualified Codes:  A41.9 - Sepsis, unspecified organism








Latisha Ratliff DO Jun 4, 2018 6:11 pm

## 2018-06-04 NOTE — PD.CAR.PN
CVT Progress Note


Subjective/Hospital Course:


feels ok , 


no fevers, on antibiotics 





discharge instructions in face to face


and discharge plan 


ok to shower 


has f/u with [facility] for wound check in 2 weeks


Objective:


GENERAL: A&O x 3 


SKIN: Warm and dry. left chest incision intact and well approximated , mild 

ecchymosis and swelling at site, no drainage or erythema 


HEAD: Normocephalic.


EYES: No scleral icterus. No injection or drainage. 


NECK: Supple, trachea midline. No JVD or lymphadenopathy.


CARDIOVASCULAR: Regular rate and rhythm without murmurs, gallops, or rubs. 


RESPIRATORY: Breath sounds equal bilaterally. No accessory muscle use.


GASTROINTESTINAL: Abdomen soft, non-tender, nondistended. 


MUSCULOSKELETAL: No cyanosis, or edema. 


BACK: Nontender without obvious deformity. No CVA tenderness.








Vital Signs








  Date Time  Temp Pulse Resp B/P (MAP) Pulse Ox O2 Delivery O2 Flow Rate FiO2


 


6/4/18 09:21   16     


 


6/4/18 08:00 97.9 68 16 134/90 (105) 98   


 


6/4/18 08:00  72      


 


6/4/18 07:42      Room Air  


 


6/4/18 06:00  70      


 


6/4/18 05:00  74      


 


6/4/18 04:00  71      


 


6/4/18 04:00      Room Air  


 


6/4/18 04:00 98.2 71 18 131/87 (102) 95   


 


6/4/18 03:00  70      


 


6/4/18 02:00  73      


 


6/4/18 01:00  69      


 


6/4/18 00:00      Room Air  


 


6/4/18 00:00  65      


 


6/4/18 00:00 98.4 65 18 129/95 (106) 98   


 


6/3/18 23:00  78      


 


6/3/18 22:00  92      


 


6/3/18 21:28     96   21


 


6/3/18 21:00  89      


 


6/3/18 20:00  95      


 


6/3/18 20:00      Room Air  


 


6/3/18 20:00 98.1 95 20 130/89 (103) 97   


 


6/3/18 18:00  82      


 


6/3/18 17:00  78      


 


6/3/18 16:00  84      


 


6/3/18 15:05 98.1 86 16 126/76 (93) 96   


 


6/3/18 15:00  84      


 


6/3/18 14:00  84      


 


6/3/18 13:00  80      


 


6/3/18 12:03 98.0 86 16 138/91 (107) 100   


 


6/3/18 12:00  70      


 


6/3/18 11:00  73      


 


6/3/18 10:26     99 Nasal Cannula 2.00 








Result Diagram:  


6/3/18 0600                                                                    

            6/3/18 0600








(1) Sepsis


(2) Polysubstance abuse


(3) HTN (hypertension)


(4) DKA (diabetic ketoacidoses)


(5) COPD (chronic obstructive pulmonary disease)


(6) Cocaine abuse


(7) Pacemaker


Plan:  removed 


continue antibiotics as outpt 











Problem Qualifiers





(1) Sepsis:  


Qualified Codes:  A41.9 - Sepsis, unspecified organism








Terwilliger,Jacqueline R. ARNP Jun 4, 2018 10:13

## 2018-06-04 NOTE — PD.CARD.PN
Subjective


Subjective Remarks


No events over the weekend





PPM removed on Friday





Objective


Medications





Current Medications








 Medications


  (Trade)  Dose


 Ordered  Sig/Jean Carlos


 Route  Start Time


 Stop Time Status Last Admin


 


  (NS Flush)  2 ml  UNSCH  PRN


 IVF  5/22/18 08:00


    5/29/18 09:34


 


 


  (Ecotrin Ec)  81 mg  DAILY


 PO  5/23/18 09:00


    6/4/18 08:35


 


 


  (Ativan)  0.5 mg  Q8H  PRN


 PO  5/22/18 12:30


    6/3/18 20:08


 


 


  (Reglan)  10 mg  DAILY


 PO  5/23/18 09:00


    6/4/18 08:36


 


 


  (Creon 12-38-60)  1 cap  TIDPC


 PO  5/22/18 13:30


    6/4/18 11:50


 


 


  (Protonix)  20 mg  DAILY


 PO  5/23/18 09:00


    6/4/18 08:35


 


 


  (Zoloft)  50 mg  DAILY


 PO  5/23/18 09:00


    6/4/18 08:35


 


 


  (Pill Splitter)  1 ea  UNSCH  PRN


 OTHER  5/22/18 12:30


     


 


 


  (Roxicodone)  15 mg  Q8H  PRN


 PO  5/22/18 12:30


    6/4/18 08:35


 


 


  (Albuterol Neb)  1.25 mg  Q2HR NEB  PRN


 NEB  5/22/18 12:45


    5/26/18 21:16


 


 


  (Heparin Inj)  5,000 units  Q12HR


 SQ  5/22/18 21:00


   Future hold 6/4/18 08:34


 


 


  (Robitussin Liq)  200 mg  Q4H  PRN


 PO  5/22/18 18:30


    6/1/18 20:28


 


 


  (Morphine Inj)  0.5 mg  Q4H  PRN


 IV  5/23/18 10:30


    5/31/18 08:43


 


 


  (Melatonin)  5 mg  HS  PRN


 PO  5/23/18 21:00


    6/1/18 23:42


 


 


  (D50w (Vial) Inj)  50 ml  UNSCH  PRN


 IV PUSH  5/23/18 13:45


    6/1/18 10:05


 


 


  (Glucagon Inj)  1 mg  UNSCH  PRN


 OTHER  5/23/18 13:45


     


 


 


  (NovoLIN R


 SUPPLEMENTAL


 SCALE)  1  ACHS SLIDING  SCALE


 SQ  5/23/18 17:00


    6/4/18 11:49


 


 


 Ceftriaxone


 Sodium 2000 mg/


 Sodium Chloride  100 ml @ 


 200 mls/hr  Q24H


 IV  5/24/18 10:00


    6/4/18 10:09


 


 


  (Spiriva Inh)  18 mcg  DAILY


 INH  5/25/18 09:30


    6/4/18 08:38


 


 


  (Catapres)  0.1 mg  Q6H  PRN


 PO  5/25/18 08:15


     


 


 


  (Levemir Inj)  24 units  BID


 SQ  5/28/18 21:00


    6/4/18 08:33


 


 


  (NS Flush)  2 ml  BID


 IV FLUSH  5/31/18 21:00


    6/4/18 08:38


 


 


  (NS Flush)  2 ml  UNSCH  PRN


 IV FLUSH  5/31/18 13:30


     


 


 


 Cefazolin Sodium


 500 mg/Sodium


 Chloride  505 ml @ 0


 mls/hr  ON  CALL


 IRRIGATION  5/31/18 13:30


 6/7/18 13:29  6/1/18 14:57


 


 


 Cefazolin Sodium/


 Dextrose  50 ml @ 


 100 mls/hr  ON  CALL


 IV  5/31/18 13:30


 6/7/18 13:29  6/1/18 14:35


 


 


  (Hibiclens 4%


 Top Soln)  1 applic  ON  CALL


 TOPICAL  5/31/18 13:30


 6/7/18 13:29   


 


 


  (Milk Of


 Magnesia Liq)  30 ml  Q6H  PRN


 PO  6/1/18 16:45


     


 


 


  (Tylenol-Codeine


 #3)  1 tab  Q4H  PRN


 PO  6/1/18 16:45


    6/4/18 11:50


 


 


  (Zofran  Odt)  4 mg  Q8HR  PRN


 SL  6/2/18 06:30


    6/3/18 20:07


 


 


  (NS Flush)  See


 Protocol  DAILY


 IV FLUSH  6/4/18 09:00


    6/4/18 08:38


 


 


  (NS Flush)  See


 Protocol  UNSCH  PRN


 IV FLUSH  6/3/18 15:30


     


 


 


  (Heparin Central


 Flush)  See


 Protocol  DAILY


 IV FLUSH  6/4/18 09:00


    6/4/18 08:36


 


 


  (Heparin Central


 Flush)  See


 Protocol  UNSCH  PRN


 IV FLUSH  6/3/18 15:30


     


 


 


  (NS Flush)    UNSCH  PRN


 IV FLUSH  6/3/18 15:30


     


 








Vital Signs / I&O





Vital Signs








  Date Time  Temp Pulse Resp B/P (MAP) Pulse Ox O2 Delivery O2 Flow Rate FiO2


 


6/4/18 09:21   16     


 


6/4/18 08:00 97.9 68 16 134/90 (105) 98   


 


6/4/18 08:00  72      


 


6/4/18 07:42      Room Air  


 


6/4/18 06:00  70      


 


6/4/18 05:00  74      


 


6/4/18 04:00  71      


 


6/4/18 04:00      Room Air  


 


6/4/18 04:00 98.2 71 18 131/87 (102) 95   


 


6/4/18 03:00  70      


 


6/4/18 02:00  73      


 


6/4/18 01:00  69      


 


6/4/18 00:00      Room Air  


 


6/4/18 00:00  65      


 


6/4/18 00:00 98.4 65 18 129/95 (106) 98   


 


6/3/18 23:00  78      


 


6/3/18 22:00  92      


 


6/3/18 21:28     96   21


 


6/3/18 21:00  89      


 


6/3/18 20:00  95      


 


6/3/18 20:00      Room Air  


 


6/3/18 20:00 98.1 95 20 130/89 (103) 97   


 


6/3/18 18:00  82      


 


6/3/18 17:00  78      


 


6/3/18 16:00  84      


 


6/3/18 15:05 98.1 86 16 126/76 (93) 96   


 


6/3/18 15:00  84      


 


6/3/18 14:00  84      


 


6/3/18 13:00  80      


 


6/3/18 12:03 98.0 86 16 138/91 (107) 100   














I/O      


 


 6/3/18 6/3/18 6/3/18 6/4/18 6/4/18 6/4/18





 07:00 15:00 23:00 07:00 15:00 23:00


 


Intake Total 480 ml  1200 ml 720 ml  


 


Output Total 1900 ml  1000 ml 1200 ml  


 


Balance -1420 ml  200 ml -480 ml  


 


      


 


Intake Oral 480 ml  1200 ml 720 ml  


 


Output Urine Total 1900 ml  1000 ml 1200 ml  


 


# Bowel Movements 0   0  








Physical Exam


GENERAL: NAD, AAOx3


SKIN: Warm and dry.


HEAD: Atraumatic. Normocephalic. 


EYES: Pupils equal and round. No scleral icterus. No injection or drainage. 


ENT: No nasal bleeding or discharge.  Mucous membranes pink and moist.


NECK: Trachea midline. No JVD. 


CARDIOVASCULAR: Regular rate and rhythm.  


RESPIRATORY: No accessory muscle use. Clear to auscultation. Breath sounds 

equal bilaterally. 


GASTROINTESTINAL: Abdomen soft, non-tender, nondistended. Hepatic and splenic 

margins not palpable. 


MUSCULOSKELETAL: Extremities without clubbing, cyanosis, or edema. No obvious 

deformities. 


NEUROLOGICAL: Awake and alert. No obvious cranial nerve deficits.  Motor 

grossly within normal limits. Five out of 5 muscle strength in the arms and 

legs.  Normal speech.


PSYCHIATRIC: Appropriate mood and affect; insight and judgment normal.





Assessment and Plan


Problem List:  


(1) Sepsis


ICD Codes:  A41.9 - Sepsis, unspecified organism


Status:  Acute


(2) Polysubstance abuse


ICD Codes:  F19.10 - Other psychoactive substance abuse, uncomplicated


Status:  Acute


(3) HTN (hypertension)


ICD Codes:  I10 - Hypertension


Status:  Chronic


(4) DKA (diabetic ketoacidoses)


ICD Codes:  E13.10 - Other specified diabetes mellitus with ketoacidosis 

without coma


Status:  Acute


(5) COPD (chronic obstructive pulmonary disease)


ICD Codes:  J44.9 - Chronic obstructive pulmonary disease


Status:  Chronic


(6) Cocaine abuse


ICD Codes:  F14.10 - Cocaine abuse


Status:  Acute


(7) Pacemaker


ICD Codes:  Z95.0 - Presence of cardiac pacemaker


Status:  Chronic


Assessment and Plan


1) Bacteremia with Group B Strep


   MAYI showing very small vegetation noted on aortic valve


   No other vegetations noted


2) PPM Interrogation showing intrinsic rhythm sinus 60-70


   PPM removed


   No current need for PPM


3) Atypical CP


   Trops negative


   Possible due to his PNA


4) Cocaine cessation


5) Follow up outpatient for further evaluation for future need for PPM, but 

does not appear to need one





Problem Qualifiers





(1) Sepsis:  


Qualified Codes:  A41.9 - Sepsis, unspecified organism








Prabhu Watkins DO Jun 4, 2018 12:02

## 2018-06-11 ENCOUNTER — HOSPITAL ENCOUNTER (EMERGENCY)
Dept: HOSPITAL 17 - NEPD | Age: 68
Discharge: HOME | End: 2018-06-11
Payer: COMMERCIAL

## 2018-06-11 VITALS — HEIGHT: 69 IN | WEIGHT: 154.32 LBS | BODY MASS INDEX: 22.86 KG/M2

## 2018-06-11 VITALS
RESPIRATION RATE: 18 BRPM | DIASTOLIC BLOOD PRESSURE: 79 MMHG | OXYGEN SATURATION: 100 % | SYSTOLIC BLOOD PRESSURE: 119 MMHG | HEART RATE: 73 BPM

## 2018-06-11 VITALS
HEART RATE: 98 BPM | DIASTOLIC BLOOD PRESSURE: 55 MMHG | TEMPERATURE: 97.3 F | SYSTOLIC BLOOD PRESSURE: 105 MMHG | RESPIRATION RATE: 16 BRPM | OXYGEN SATURATION: 99 %

## 2018-06-11 DIAGNOSIS — E11.9: ICD-10-CM

## 2018-06-11 DIAGNOSIS — J44.9: ICD-10-CM

## 2018-06-11 DIAGNOSIS — I10: ICD-10-CM

## 2018-06-11 DIAGNOSIS — Z79.4: ICD-10-CM

## 2018-06-11 DIAGNOSIS — K21.9: ICD-10-CM

## 2018-06-11 DIAGNOSIS — M96.841: Primary | ICD-10-CM

## 2018-06-11 DIAGNOSIS — E78.00: ICD-10-CM

## 2018-06-11 DIAGNOSIS — Z87.891: ICD-10-CM

## 2018-06-11 DIAGNOSIS — Z95.0: ICD-10-CM

## 2018-06-11 LAB
ALBUMIN SERPL-MCNC: 3 GM/DL (ref 3.4–5)
ALP SERPL-CCNC: 128 U/L (ref 45–117)
ALT SERPL-CCNC: 27 U/L (ref 12–78)
AST SERPL-CCNC: 18 U/L (ref 15–37)
BASOPHILS # BLD AUTO: 0 TH/MM3 (ref 0–0.2)
BASOPHILS NFR BLD: 0.6 % (ref 0–2)
BILIRUB SERPL-MCNC: 0.5 MG/DL (ref 0.2–1)
BUN SERPL-MCNC: 12 MG/DL (ref 7–18)
CALCIUM SERPL-MCNC: 8.4 MG/DL (ref 8.5–10.1)
CHLORIDE SERPL-SCNC: 96 MEQ/L (ref 98–107)
CREAT SERPL-MCNC: 0.95 MG/DL (ref 0.6–1.3)
EOSINOPHIL # BLD: 0.2 TH/MM3 (ref 0–0.4)
EOSINOPHIL NFR BLD: 3.5 % (ref 0–4)
ERYTHROCYTE [DISTWIDTH] IN BLOOD BY AUTOMATED COUNT: 13.3 % (ref 11.6–17.2)
GFR SERPLBLD BASED ON 1.73 SQ M-ARVRAT: 96 ML/MIN (ref 89–?)
GLUCOSE SERPL-MCNC: 123 MG/DL (ref 74–106)
HCO3 BLD-SCNC: 30.3 MEQ/L (ref 21–32)
HCT VFR BLD CALC: 32.3 % (ref 39–51)
HGB BLD-MCNC: 11.3 GM/DL (ref 13–17)
LYMPHOCYTES # BLD AUTO: 1.2 TH/MM3 (ref 1–4.8)
LYMPHOCYTES NFR BLD AUTO: 18.1 % (ref 9–44)
MCH RBC QN AUTO: 30.9 PG (ref 27–34)
MCHC RBC AUTO-ENTMCNC: 34.9 % (ref 32–36)
MCV RBC AUTO: 88.6 FL (ref 80–100)
MONOCYTE #: 0.8 TH/MM3 (ref 0–0.9)
MONOCYTES NFR BLD: 12.2 % (ref 0–8)
NEUTROPHILS # BLD AUTO: 4.3 TH/MM3 (ref 1.8–7.7)
NEUTROPHILS NFR BLD AUTO: 65.6 % (ref 16–70)
PLATELET # BLD: 193 TH/MM3 (ref 150–450)
PMV BLD AUTO: 8.4 FL (ref 7–11)
PROT SERPL-MCNC: 6.9 GM/DL (ref 6.4–8.2)
RBC # BLD AUTO: 3.64 MIL/MM3 (ref 4.5–5.9)
SODIUM SERPL-SCNC: 135 MEQ/L (ref 136–145)
WBC # BLD AUTO: 6.5 TH/MM3 (ref 4–11)

## 2018-06-11 PROCEDURE — 99284 EMERGENCY DEPT VISIT MOD MDM: CPT

## 2018-06-11 PROCEDURE — 80053 COMPREHEN METABOLIC PANEL: CPT

## 2018-06-11 PROCEDURE — 71260 CT THORAX DX C+: CPT

## 2018-06-11 PROCEDURE — 85025 COMPLETE CBC W/AUTO DIFF WBC: CPT

## 2018-06-11 NOTE — PD
HPI


Chief Complaint:  Skin Problem


Time Seen by Provider:  17:11


Travel History


International Travel<30 days:  No


Contact w/Intl Traveler<30days:  No


Traveled to known affect area:  No





History of Present Illness


HPI


Patient is a 67 year old male who comes in because fluid has built up in his 

pacemaker site.  He was just here and discharged on June 8 after being treated 

for sepsis/endocarditis. He is currently receiving Rocephin IV at home and 

reports compliance with this. He has noticed the fluid build-up since Saturday.

  He says it seems to be getting worse.  He denies any pain to the area.  He 

denies fever or chills. He hasn't noticed any discoloration to his skin.  He 

denies leakage from the area.  Severity is mild.





PFSH


Past Medical History


Arthritis:  Yes (Both legs, all fingers. )


Asthma:  Yes


Autoimmune Disease:  Yes (diabetes)


Blood Disorders:  No


Anxiety:  Yes


Depression:  Yes


Heart Rhythm Problems:  Yes (bradycardia)


Cancer:  No


Cardiac Catheterization:  Yes


Cardiovascular Problems:  Yes


High Cholesterol:  Yes


Chemotherapy:  No


Chest Pain:  No


Congestive Heart Failure:  No


COPD:  Yes


Cerebrovascular Accident:  No


Diabetes:  Yes


Patient Takes Glucophage:  Yes


Diminished Hearing:  No


Endocrine:  No


Gastrointestinal Disorders:  Yes (PANCREATITIS)


GERD:  Yes


Glaucoma:  No


Genitourinary:  Yes


Headaches:  No


Hepatitis:  No


Hiatal Hernia:  No


Heparin Induced Thrombocytopen:  No


Hypertension:  Yes


Immune Disorder:  No


Implanted Vascular Access Dvce:  Yes


Kidney Stones:  No


Musculoskeletal:  No


Neurologic:  No


Psychiatric:  No


Reproductive:  Yes


Respiratory:  Yes


Immunizations Current:  Yes


Migraines:  No


Myocardial Infarction:  No


Pancreatitis:  Yes


Pneumonia:  Yes


Radiation Therapy:  No


Renal Failure:  Yes


Seizures:  No


Sickle Cell Disease:  No


Sleep Apnea:  No


Thyroid Disease:  No


Ulcer:  No


PNEUMOCCOCAL Vaccine (Year):  2





Past Surgical History


Abdominal Surgery:  No


AICD:  No


Arteriovenous Shunt:  No


Body Medical Devices:  PACEMAKER- biotronik


Cardiac Surgery:  Yes (pacemaker-REMOVED JUNE 2018)


Cholecystectomy:  Yes


Ear Surgery:  No


Endocrine Surgery:  No


Eye Surgery:  No


Genitourinary Surgery:  No


Gynecologic Surgery:  No


Insulin Pump:  No


Joint Replacement:  No


Neurologic Surgery:  Yes (LAMINECTOMY L4/5)


Oral Surgery:  No


Pacemaker:  Yes


Thoracic Surgery:  No


Other Surgery:  Yes (HAND SURGERY -RIGHT TENDON REPAIR)





Social History


Alcohol Use:  No (STATES QUIT)


Tobacco Use:  No (QUIT 2012)


Substance Use:  No (Cocaine "every once in a while": "wednesday or thursday" )





Allergies-Medications


(Allergen,Severity, Reaction):  


Coded Allergies:  


     No Known Drug Allergies (Verified  Allergy, Unknown, 5/22/18)


     MRI PRECAUTION (Verified  Adverse Reaction, Severe, PACEMAKER, 5/22/18)


 PT HAS PACEMAKER PER NURSE PARISH/[facility]


     *MDRO Multi-Drug Resistant Organism (Verified  Adverse Reaction, Unknown, 

Cleared - 5/24/16, 5/22/18)


 MRSA PCR (nares) negative - 5/21/16 & 5/24/16


 ***Cleared per Infection Control***


 


 MRSA (blood and urine) - 2/2013


Reported Meds & Prescriptions





Reported Meds & Active Scripts


Active


Spiriva Handihaler (Tiotropium Inh) 18 Mcg Cap 18 Mcg INH DAILY


     1 capsule = 18 mcg


Epinephrine Inj 1 Mg/Ml (1 Ml) Inj 0.3 Mg SQ ONCE PRN


     Give with any signs of respiratory distress.


Reported


Lantus Solostar Pen Inj (Insulin Glargine) 300 Unit/3 Ml Pen 25 Units SQ BID


Lisinopril 20 Mg Tab 20 Mg PO BID


Roxicodone (Oxycodone HCl) 15 Mg Tab 15 Mg PO Q8H PRN


Zoloft (Sertraline HCl) 50 Mg Tab 50 Mg PO DAILY


Bydureon Pen Inj (Exenatide) 2 Mg Pfpen 2 Mg SQ Q7D


Pantoprazole (Pantoprazole Sodium) 20 Mg Tab 20 Mg PO DAILY


Proair Hfa 8.5 GM Inh (Albuterol Sulfate) 90 Mcg/Act Aer 1 Puff INH Q4H PRN


     108 mcg/actuation


Clonidine (Clonidine HCl) 0.2 Mg Tab 0.05 Tab PO DAILY


Ativan (Lorazepam) 0.5 Mg Tab 0.5 Mg PO Q8H PRN


Reglan (Metoclopramide HCl) 10 Mg Tab 10 Mg PO DAILY


Creon (Amylase/Lipase/Protease) 12,000-38,000-60,000 Units Cap 1 Cap PO TIDPC


Aspirin EC (Aspirin) 81 Mg Tabdr 81 Mg PO DAILY


Duoneb (Ipratropium-Albuterol Neb) 0.5-2.5 Mg/3 Ml Neb 1 Nebule INH Q4HR NEB


Janumet (Sitagliptin-Metformin)  Mg Tab 1 Tab PO BID


Cialis (Tadalafil) 5 Mg Tab 5 Mg PO DAILY


     Do not exceed 1 dose/day.








Review of Systems


Except as stated in HPI:  all other systems reviewed are Neg


General / Constitutional:  No: Fever, Chills


HENT:  No: Headaches, Lightheadedness


Cardiovascular:  No: Chest Pain or Discomfort


Respiratory:  No: Shortness of Breath


Gastrointestinal:  No: Nausea, Vomiting


Musculoskeletal:  No: Myalgias


Skin:  No Rash, No Change in Pigmentation


Neurologic:  No: Weakness, Dizziness





Physical Exam


Narrative


GENERAL: Awake and alert, in no acute distress. 


SKIN: Focused skin assessment warm/dry. 4cm fluid filled sac at site of 

previous pacemaker.  Nonerythematous or warm to the touch. No tenderness to 

palpation. 


HEAD: Atraumatic. Normocephalic. 


EYES: Pupils equal and round. No scleral icterus. No injection or drainage. 


ENT: Mucous membranes pink and moist.


NECK: Trachea midline. No JVD. 


CARDIOVASCULAR: Regular rate and rhythm.  No murmur appreciated.


RESPIRATORY: No accessory muscle use. Clear to auscultation. Breath sounds 

equal bilaterally. 


GASTROINTESTINAL: Abdomen soft, non-tender, nondistended. 


MUSCULOSKELETAL: No obvious deformities. No clubbing.  No cyanosis.  No edema. 


NEUROLOGICAL: Awake and alert. No obvious cranial nerve deficits.  Motor 

grossly within normal limits. Normal speech.


PSYCHIATRIC: Appropriate mood and affect; insight and judgment normal.





Data


Data


Last Documented VS





Vital Signs








  Date Time  Temp Pulse Resp B/P (MAP) Pulse Ox O2 Delivery O2 Flow Rate FiO2


 


6/11/18 19:04  73 18 119/79 (92) 100 Room Air  


 


6/11/18 14:50 97.3       








Orders





 Orders


Complete Blood Count With Diff (6/11/18 17:33)


Comprehensive Metabolic Panel (6/11/18 17:33)


Ct Thorax/ Chest W Iv Contrast (6/11/18 )


Iohexol 350 Inj (Omnipaque 350 Inj) (6/11/18 18:59)





Labs





Laboratory Tests








Test


  6/11/18


17:40


 


White Blood Count 6.5 TH/MM3 


 


Red Blood Count 3.64 MIL/MM3 


 


Hemoglobin 11.3 GM/DL 


 


Hematocrit 32.3 % 


 


Mean Corpuscular Volume 88.6 FL 


 


Mean Corpuscular Hemoglobin 30.9 PG 


 


Mean Corpuscular Hemoglobin


Concent 34.9 % 


 


 


Red Cell Distribution Width 13.3 % 


 


Platelet Count 193 TH/MM3 


 


Mean Platelet Volume 8.4 FL 


 


Neutrophils (%) (Auto) 65.6 % 


 


Lymphocytes (%) (Auto) 18.1 % 


 


Monocytes (%) (Auto) 12.2 % 


 


Eosinophils (%) (Auto) 3.5 % 


 


Basophils (%) (Auto) 0.6 % 


 


Neutrophils # (Auto) 4.3 TH/MM3 


 


Lymphocytes # (Auto) 1.2 TH/MM3 


 


Monocytes # (Auto) 0.8 TH/MM3 


 


Eosinophils # (Auto) 0.2 TH/MM3 


 


Basophils # (Auto) 0.0 TH/MM3 


 


CBC Comment DIFF FINAL 


 


Differential Comment  


 


Blood Urea Nitrogen 12 MG/DL 


 


Creatinine 0.95 MG/DL 


 


Random Glucose 123 MG/DL 


 


Total Protein 6.9 GM/DL 


 


Albumin 3.0 GM/DL 


 


Calcium Level 8.4 MG/DL 


 


Alkaline Phosphatase 128 U/L 


 


Aspartate Amino Transf


(AST/SGOT) 18 U/L 


 


 


Alanine Aminotransferase


(ALT/SGPT) 27 U/L 


 


 


Total Bilirubin 0.5 MG/DL 


 


Sodium Level 135 MEQ/L 


 


Potassium Level 3.6 MEQ/L 


 


Chloride Level 96 MEQ/L 


 


Carbon Dioxide Level 30.3 MEQ/L 


 


Anion Gap 9 MEQ/L 


 


Estimat Glomerular Filtration


Rate 96 ML/MIN 


 











[facility]


Medical Decision Making


Medical Screen Exam Complete:  Yes


Emergency Medical Condition:  Yes


Medical Record Reviewed:  Yes


Differential Diagnosis


seroma vs hematoma vs abscess


Narrative Course


Patient is a 67 year old male who comes in due to fluid build up at his 

surgical site.  Exam shows an area of fluid build up at the old pacemaker site.

  IV established, labs sent. Labs show no acute abnormalities.  





CT chest performed shows likely post-operative hematoma. 


Last 24 hours Impressions








Chest CT 6/11/18 0000 Signed





Impressions: 





 CONCLUSION:  Interval development of fluid and gas collection anterior to the p





 soas muscle on left side probably postprocedural hematoma, however focal absces





 s is difficult to exclude.  





  





 





I spoke with Dr. Garsia who would like to see the patient in the office.  

Patient given his information and advised to call tomorrow to set up an 

appointment.  Advised to return at any time for any worsening symptoms.





Diagnosis





 Primary Impression:  


 Hematoma


Referrals:  


Kristina Garsia MD


call for appointment


Patient Instructions:  General Instructions, Hematoma (ED)





***Additional Instructions:  


Follow-up with Dr. Garsia, call the office tomorrow.  Return to the ED as 

needed for any worsening symptoms.


Disposition:  01 DISCHARGE HOME


Condition:  Stable











Shantelle Box MD Jun 11, 2018 18:54

## 2018-06-11 NOTE — RADRPT
EXAM DATE:  6/11/2018 7:00 PM EDT

AGE/SEX:        67 years / Male



INDICATIONS:  Pacemaker removed. Swollen surgical site.



CLINICAL DATA:  This is the patient's initial encounter. Patient reports that signs and symptoms have
 been present for 1 day and indicates a pain score of 7/10. 

                                                                          

MEDICAL/SURGICAL HISTORY:   Cardiovascular disease.  Chronic obstructive pulmonary disease.  Diabetes
 mellitus type II. . Pacemaker, removal.



RADIATION DOSE:  7.26 CTDI (mGy)









COMPARISON:      [facility] [place], CT THORAX W/O CONTRAST, 5/26/2018.  . 





TECHNIQUE:  Multiple contiguous axial images were obtained through the chest during bolus infusion of
 70 ml Omnipaque 350 (iohexol)  nonionic water-soluble contrast as a single exam dose.   Images were 
obtained in suspended respiration using multiple row detector helical technique.  Using automated exp
osure control and adjustment of the mA and/or kV according to patient size, radiation dose was kept a
s low as reasonably achievable to obtain optimal diagnostic quality images.



FINDINGS: 

COPD changes are seen with scattered areas of pleural thickening with an approximate 1 cm right middl
e lobe nodule. Pleural plaque calcifications are seen in the right chest anteriorly and there is a se
parate 4 to 5 mm nodule in right lower lobe anterolateral. Scattered areas of scarring and chronic in
terstitial process is seen in both lung bases and bilateral apices. There is no pleural effusion.  No
 appreciable pathological adenopathy is seen within the mediastinum.  There is pneumobilia within the
 left hepatic lobe. Chronic vascular atherosclerotic calcifications are seen involving the aorta. Cor
onary artery calcifications are seen typically seen with coronary artery disease and clinical correla
tion and evaluation is suggested. Approximate 4.1 cm fluid collection is present mixed with gas adjac
ent to the upper margins of the left psoas muscle probably the insertion site of the prior pacemaker 
most likely hematoma, however infectious process is difficult to exclude at this time.



CONCLUSION:  Interval development of fluid and gas collection anterior to the psoas muscle on left si
de probably postprocedural hematoma, however focal abscess is difficult to exclude.  



Electronically signed by: HARVEY Johnson MD  6/11/2018 7:20 PM EDT